# Patient Record
Sex: MALE | Race: WHITE | NOT HISPANIC OR LATINO | Employment: OTHER | URBAN - METROPOLITAN AREA
[De-identification: names, ages, dates, MRNs, and addresses within clinical notes are randomized per-mention and may not be internally consistent; named-entity substitution may affect disease eponyms.]

---

## 2020-05-28 ENCOUNTER — HOSPITAL ENCOUNTER (INPATIENT)
Facility: HOSPITAL | Age: 70
LOS: 2 days | Discharge: HOME | DRG: 872 | End: 2020-05-30
Attending: EMERGENCY MEDICINE | Admitting: STUDENT IN AN ORGANIZED HEALTH CARE EDUCATION/TRAINING PROGRAM
Payer: COMMERCIAL

## 2020-05-28 ENCOUNTER — APPOINTMENT (EMERGENCY)
Dept: CT IMAGING | Facility: HOSPITAL | Age: 70
DRG: 872 | End: 2020-05-28
Payer: COMMERCIAL

## 2020-05-28 DIAGNOSIS — K92.1 MELENA: ICD-10-CM

## 2020-05-28 DIAGNOSIS — D68.32 WARFARIN-INDUCED COAGULOPATHY (HCC): ICD-10-CM

## 2020-05-28 DIAGNOSIS — R10.9 ABDOMINAL PAIN, UNSPECIFIED ABDOMINAL LOCATION: ICD-10-CM

## 2020-05-28 DIAGNOSIS — K52.9 JEJUNITIS: Primary | ICD-10-CM

## 2020-05-28 DIAGNOSIS — E77.8 HYPOPROTEINEMIA (HCC): ICD-10-CM

## 2020-05-28 DIAGNOSIS — T45.515A WARFARIN-INDUCED COAGULOPATHY (HCC): ICD-10-CM

## 2020-05-28 DIAGNOSIS — K92.1 GASTROINTESTINAL HEMORRHAGE WITH MELENA: ICD-10-CM

## 2020-05-28 DIAGNOSIS — E83.51 HYPOCALCEMIA: ICD-10-CM

## 2020-05-28 DIAGNOSIS — D62 ACUTE BLOOD LOSS ANEMIA: ICD-10-CM

## 2020-05-28 PROBLEM — E11.9 DIABETES MELLITUS (HCC): Status: ACTIVE | Noted: 2020-05-28

## 2020-05-28 PROBLEM — G70.01 MYASTHENIA GRAVIS WITH EXACERBATION (HCC): Status: ACTIVE | Noted: 2019-01-09

## 2020-05-28 PROBLEM — C61 MALIGNANT NEOPLASM OF PROSTATE (HCC): Status: ACTIVE | Noted: 2020-05-28

## 2020-05-28 PROBLEM — E78.2 MIXED HYPERLIPIDEMIA: Status: ACTIVE | Noted: 2020-05-28

## 2020-05-28 PROBLEM — A41.9 SEPSIS (HCC): Status: ACTIVE | Noted: 2020-05-28

## 2020-05-28 PROBLEM — R79.1 SUPRATHERAPEUTIC INR: Status: ACTIVE | Noted: 2020-05-28

## 2020-05-28 PROBLEM — J44.9 CHRONIC OBSTRUCTIVE LUNG DISEASE (HCC): Status: ACTIVE | Noted: 2020-05-28

## 2020-05-28 LAB
ABO GROUP BLD: NORMAL
ABO GROUP BLD: NORMAL
ALBUMIN SERPL BCP-MCNC: 2.7 G/DL (ref 3.5–5)
ALP SERPL-CCNC: 43 U/L (ref 46–116)
ALT SERPL W P-5'-P-CCNC: 25 U/L (ref 12–78)
ANION GAP SERPL CALCULATED.3IONS-SCNC: 14 MMOL/L (ref 4–13)
APTT PPP: 102 SECONDS (ref 23–37)
AST SERPL W P-5'-P-CCNC: 16 U/L (ref 5–45)
BASOPHILS # BLD AUTO: 0.02 THOUSANDS/ΜL (ref 0–0.1)
BASOPHILS NFR BLD AUTO: 0 % (ref 0–1)
BILIRUB SERPL-MCNC: 0.5 MG/DL (ref 0.2–1)
BLD GP AB SCN SERPL QL: NEGATIVE
BUN SERPL-MCNC: 28 MG/DL (ref 5–25)
CALCIUM SERPL-MCNC: 7.6 MG/DL (ref 8.3–10.1)
CHLORIDE SERPL-SCNC: 100 MMOL/L (ref 100–108)
CO2 SERPL-SCNC: 24 MMOL/L (ref 21–32)
CREAT SERPL-MCNC: 1.16 MG/DL (ref 0.6–1.3)
CRP SERPL QL: 84.3 MG/L
EOSINOPHIL # BLD AUTO: 0.02 THOUSAND/ΜL (ref 0–0.61)
EOSINOPHIL NFR BLD AUTO: 0 % (ref 0–6)
ERYTHROCYTE [DISTWIDTH] IN BLOOD BY AUTOMATED COUNT: 12.9 % (ref 11.6–15.1)
GFR SERPL CREATININE-BSD FRML MDRD: 63 ML/MIN/1.73SQ M
GLUCOSE SERPL-MCNC: 186 MG/DL (ref 65–140)
GLUCOSE SERPL-MCNC: 193 MG/DL (ref 65–140)
HCT VFR BLD AUTO: 36.4 % (ref 36.5–49.3)
HGB BLD-MCNC: 12 G/DL (ref 12–17)
HGB BLD-MCNC: 9.7 G/DL (ref 12–17)
IMM GRANULOCYTES # BLD AUTO: 0.09 THOUSAND/UL (ref 0–0.2)
IMM GRANULOCYTES NFR BLD AUTO: 1 % (ref 0–2)
INR PPP: 8.6 (ref 0.84–1.19)
LACTATE SERPL-SCNC: 2 MMOL/L (ref 0.5–2)
LACTATE SERPL-SCNC: 2.5 MMOL/L (ref 0.5–2)
LYMPHOCYTES # BLD AUTO: 2.11 THOUSANDS/ΜL (ref 0.6–4.47)
LYMPHOCYTES NFR BLD AUTO: 15 % (ref 14–44)
MAGNESIUM SERPL-MCNC: 2 MG/DL (ref 1.6–2.6)
MCH RBC QN AUTO: 31.3 PG (ref 26.8–34.3)
MCHC RBC AUTO-ENTMCNC: 33 G/DL (ref 31.4–37.4)
MCV RBC AUTO: 95 FL (ref 82–98)
MONOCYTES # BLD AUTO: 1.09 THOUSAND/ΜL (ref 0.17–1.22)
MONOCYTES NFR BLD AUTO: 8 % (ref 4–12)
NEUTROPHILS # BLD AUTO: 11.19 THOUSANDS/ΜL (ref 1.85–7.62)
NEUTS SEG NFR BLD AUTO: 76 % (ref 43–75)
NRBC BLD AUTO-RTO: 0 /100 WBCS
PLATELET # BLD AUTO: 302 THOUSANDS/UL (ref 149–390)
PMV BLD AUTO: 9.7 FL (ref 8.9–12.7)
POTASSIUM SERPL-SCNC: 5 MMOL/L (ref 3.5–5.3)
PROT SERPL-MCNC: 5.7 G/DL (ref 6.4–8.2)
PROTHROMBIN TIME: 72.8 SECONDS (ref 11.6–14.5)
RBC # BLD AUTO: 3.84 MILLION/UL (ref 3.88–5.62)
RH BLD: POSITIVE
RH BLD: POSITIVE
SARS-COV-2 RNA RESP QL NAA+PROBE: NEGATIVE
SODIUM SERPL-SCNC: 138 MMOL/L (ref 136–145)
SPECIMEN EXPIRATION DATE: NORMAL
TROPONIN I SERPL-MCNC: <0.02 NG/ML
TSH SERPL DL<=0.05 MIU/L-ACNC: 0.46 UIU/ML (ref 0.36–3.74)
WBC # BLD AUTO: 14.52 THOUSAND/UL (ref 4.31–10.16)

## 2020-05-28 PROCEDURE — 36415 COLL VENOUS BLD VENIPUNCTURE: CPT | Performed by: EMERGENCY MEDICINE

## 2020-05-28 PROCEDURE — 96365 THER/PROPH/DIAG IV INF INIT: CPT

## 2020-05-28 PROCEDURE — 87040 BLOOD CULTURE FOR BACTERIA: CPT | Performed by: NURSE PRACTITIONER

## 2020-05-28 PROCEDURE — 86901 BLOOD TYPING SEROLOGIC RH(D): CPT | Performed by: EMERGENCY MEDICINE

## 2020-05-28 PROCEDURE — 83735 ASSAY OF MAGNESIUM: CPT | Performed by: EMERGENCY MEDICINE

## 2020-05-28 PROCEDURE — 86850 RBC ANTIBODY SCREEN: CPT | Performed by: EMERGENCY MEDICINE

## 2020-05-28 PROCEDURE — 86900 BLOOD TYPING SEROLOGIC ABO: CPT | Performed by: EMERGENCY MEDICINE

## 2020-05-28 PROCEDURE — 85025 COMPLETE CBC W/AUTO DIFF WBC: CPT | Performed by: EMERGENCY MEDICINE

## 2020-05-28 PROCEDURE — 82948 REAGENT STRIP/BLOOD GLUCOSE: CPT

## 2020-05-28 PROCEDURE — 99285 EMERGENCY DEPT VISIT HI MDM: CPT | Performed by: EMERGENCY MEDICINE

## 2020-05-28 PROCEDURE — 74177 CT ABD & PELVIS W/CONTRAST: CPT

## 2020-05-28 PROCEDURE — 85018 HEMOGLOBIN: CPT | Performed by: NURSE PRACTITIONER

## 2020-05-28 PROCEDURE — 96366 THER/PROPH/DIAG IV INF ADDON: CPT

## 2020-05-28 PROCEDURE — 93005 ELECTROCARDIOGRAM TRACING: CPT

## 2020-05-28 PROCEDURE — 85610 PROTHROMBIN TIME: CPT | Performed by: EMERGENCY MEDICINE

## 2020-05-28 PROCEDURE — 86140 C-REACTIVE PROTEIN: CPT | Performed by: EMERGENCY MEDICINE

## 2020-05-28 PROCEDURE — 83036 HEMOGLOBIN GLYCOSYLATED A1C: CPT | Performed by: NURSE PRACTITIONER

## 2020-05-28 PROCEDURE — 84443 ASSAY THYROID STIM HORMONE: CPT | Performed by: NURSE PRACTITIONER

## 2020-05-28 PROCEDURE — 99285 EMERGENCY DEPT VISIT HI MDM: CPT

## 2020-05-28 PROCEDURE — 87635 SARS-COV-2 COVID-19 AMP PRB: CPT | Performed by: EMERGENCY MEDICINE

## 2020-05-28 PROCEDURE — 83605 ASSAY OF LACTIC ACID: CPT | Performed by: EMERGENCY MEDICINE

## 2020-05-28 PROCEDURE — 85730 THROMBOPLASTIN TIME PARTIAL: CPT | Performed by: EMERGENCY MEDICINE

## 2020-05-28 PROCEDURE — 96375 TX/PRO/DX INJ NEW DRUG ADDON: CPT

## 2020-05-28 PROCEDURE — 99223 1ST HOSP IP/OBS HIGH 75: CPT | Performed by: NURSE PRACTITIONER

## 2020-05-28 PROCEDURE — 82272 OCCULT BLD FECES 1-3 TESTS: CPT

## 2020-05-28 PROCEDURE — 80053 COMPREHEN METABOLIC PANEL: CPT | Performed by: EMERGENCY MEDICINE

## 2020-05-28 PROCEDURE — C9113 INJ PANTOPRAZOLE SODIUM, VIA: HCPCS | Performed by: EMERGENCY MEDICINE

## 2020-05-28 PROCEDURE — 84484 ASSAY OF TROPONIN QUANT: CPT | Performed by: EMERGENCY MEDICINE

## 2020-05-28 PROCEDURE — C9132 KCENTRA, PER I.U.: HCPCS | Performed by: EMERGENCY MEDICINE

## 2020-05-28 RX ORDER — NICOTINE 21 MG/24HR
1 PATCH, TRANSDERMAL 24 HOURS TRANSDERMAL DAILY
Status: DISCONTINUED | OUTPATIENT
Start: 2020-05-29 | End: 2020-05-30 | Stop reason: HOSPADM

## 2020-05-28 RX ORDER — MAGNESIUM HYDROXIDE/ALUMINUM HYDROXICE/SIMETHICONE 120; 1200; 1200 MG/30ML; MG/30ML; MG/30ML
30 SUSPENSION ORAL EVERY 6 HOURS PRN
Status: DISCONTINUED | OUTPATIENT
Start: 2020-05-28 | End: 2020-05-30 | Stop reason: HOSPADM

## 2020-05-28 RX ORDER — ONDANSETRON 2 MG/ML
4 INJECTION INTRAMUSCULAR; INTRAVENOUS EVERY 6 HOURS PRN
Status: DISCONTINUED | OUTPATIENT
Start: 2020-05-28 | End: 2020-05-30 | Stop reason: HOSPADM

## 2020-05-28 RX ORDER — SIMVASTATIN 20 MG
10 TABLET ORAL DAILY
COMMUNITY

## 2020-05-28 RX ORDER — FOLIC ACID 1 MG/1
1 TABLET ORAL 2 TIMES DAILY
COMMUNITY
Start: 2011-06-09

## 2020-05-28 RX ORDER — LEVOTHYROXINE SODIUM 112 UG/1
112 TABLET ORAL 2 TIMES DAILY
COMMUNITY

## 2020-05-28 RX ORDER — OMEPRAZOLE 20 MG/1
20 CAPSULE, DELAYED RELEASE ORAL 3 TIMES DAILY
COMMUNITY
Start: 2011-06-09 | End: 2020-05-30 | Stop reason: HOSPADM

## 2020-05-28 RX ORDER — ACETAMINOPHEN 160 MG
2000 TABLET,DISINTEGRATING ORAL
COMMUNITY

## 2020-05-28 RX ORDER — FOLIC ACID 1 MG/1
1 TABLET ORAL 2 TIMES DAILY
Status: DISCONTINUED | OUTPATIENT
Start: 2020-05-28 | End: 2020-05-30 | Stop reason: HOSPADM

## 2020-05-28 RX ORDER — PANTOPRAZOLE SODIUM 40 MG/1
40 TABLET, DELAYED RELEASE ORAL
Status: DISCONTINUED | OUTPATIENT
Start: 2020-05-29 | End: 2020-05-28

## 2020-05-28 RX ORDER — SODIUM CHLORIDE 9 MG/ML
75 INJECTION, SOLUTION INTRAVENOUS CONTINUOUS
Status: DISCONTINUED | OUTPATIENT
Start: 2020-05-28 | End: 2020-05-29

## 2020-05-28 RX ORDER — WARFARIN SODIUM 2.5 MG/1
2.5 TABLET ORAL DAILY
COMMUNITY
Start: 2012-03-02 | End: 2020-05-30 | Stop reason: HOSPADM

## 2020-05-28 RX ORDER — HYDROMORPHONE HCL/PF 1 MG/ML
0.5 SYRINGE (ML) INJECTION ONCE
Status: COMPLETED | OUTPATIENT
Start: 2020-05-28 | End: 2020-05-28

## 2020-05-28 RX ORDER — LEVOTHYROXINE SODIUM 112 UG/1
224 TABLET ORAL DAILY
Status: DISCONTINUED | OUTPATIENT
Start: 2020-05-29 | End: 2020-05-30 | Stop reason: HOSPADM

## 2020-05-28 RX ORDER — ACETAMINOPHEN 325 MG/1
325 TABLET ORAL CONTINUOUS PRN
COMMUNITY

## 2020-05-28 RX ORDER — PRAVASTATIN SODIUM 40 MG
40 TABLET ORAL
Status: DISCONTINUED | OUTPATIENT
Start: 2020-05-29 | End: 2020-05-30 | Stop reason: HOSPADM

## 2020-05-28 RX ORDER — CIPROFLOXACIN 2 MG/ML
400 INJECTION, SOLUTION INTRAVENOUS ONCE
Status: COMPLETED | OUTPATIENT
Start: 2020-05-28 | End: 2020-05-28

## 2020-05-28 RX ORDER — GLIPIZIDE 5 MG/1
5 TABLET, FILM COATED, EXTENDED RELEASE ORAL
COMMUNITY

## 2020-05-28 RX ORDER — ATROPINE SULFATE 0.05 MG/ML
0.25 INJECTION INTRAVENOUS 3 TIMES DAILY PRN
COMMUNITY

## 2020-05-28 RX ORDER — SILDENAFIL 100 MG/1
100 TABLET, FILM COATED ORAL AS NEEDED
COMMUNITY

## 2020-05-28 RX ORDER — LEVOTHYROXINE SODIUM 112 UG/1
112 TABLET ORAL 2 TIMES DAILY
Status: DISCONTINUED | OUTPATIENT
Start: 2020-05-28 | End: 2020-05-28

## 2020-05-28 RX ORDER — ONDANSETRON 2 MG/ML
1 INJECTION INTRAMUSCULAR; INTRAVENOUS ONCE
Status: COMPLETED | OUTPATIENT
Start: 2020-05-28 | End: 2020-05-28

## 2020-05-28 RX ADMIN — IOHEXOL 100 ML: 350 INJECTION, SOLUTION INTRAVENOUS at 18:04

## 2020-05-28 RX ADMIN — FOLIC ACID 1 MG: 1 TABLET ORAL at 22:20

## 2020-05-28 RX ADMIN — SODIUM CHLORIDE 1000 ML: 0.9 INJECTION, SOLUTION INTRAVENOUS at 17:26

## 2020-05-28 RX ADMIN — SODIUM CHLORIDE 1000 ML: 0.9 INJECTION, SOLUTION INTRAVENOUS at 19:34

## 2020-05-28 RX ADMIN — CIPROFLOXACIN 400 MG: 2 INJECTION, SOLUTION INTRAVENOUS at 19:32

## 2020-05-28 RX ADMIN — SODIUM CHLORIDE 8 MG/HR: 9 INJECTION, SOLUTION INTRAVENOUS at 18:18

## 2020-05-28 RX ADMIN — HYDROMORPHONE HYDROCHLORIDE 0.5 MG: 1 INJECTION, SOLUTION INTRAMUSCULAR; INTRAVENOUS; SUBCUTANEOUS at 17:28

## 2020-05-28 RX ADMIN — PHYTONADIONE 10 MG: 10 INJECTION, EMULSION INTRAMUSCULAR; INTRAVENOUS; SUBCUTANEOUS at 18:44

## 2020-05-28 RX ADMIN — SODIUM CHLORIDE 80 MG: 9 INJECTION, SOLUTION INTRAVENOUS at 17:43

## 2020-05-28 RX ADMIN — Medication 3500 UNITS: at 18:59

## 2020-05-28 RX ADMIN — METRONIDAZOLE 500 MG: 500 INJECTION, SOLUTION INTRAVENOUS at 20:36

## 2020-05-28 RX ADMIN — INSULIN LISPRO 1 UNITS: 100 INJECTION, SOLUTION INTRAVENOUS; SUBCUTANEOUS at 22:51

## 2020-05-28 RX ADMIN — SODIUM CHLORIDE 75 ML/HR: 0.9 INJECTION, SOLUTION INTRAVENOUS at 22:20

## 2020-05-29 ENCOUNTER — ANESTHESIA EVENT (INPATIENT)
Dept: GASTROENTEROLOGY | Facility: HOSPITAL | Age: 70
DRG: 872 | End: 2020-05-29
Payer: COMMERCIAL

## 2020-05-29 ENCOUNTER — APPOINTMENT (INPATIENT)
Dept: GASTROENTEROLOGY | Facility: HOSPITAL | Age: 70
DRG: 872 | End: 2020-05-29
Payer: COMMERCIAL

## 2020-05-29 ENCOUNTER — ANESTHESIA (INPATIENT)
Dept: GASTROENTEROLOGY | Facility: HOSPITAL | Age: 70
DRG: 872 | End: 2020-05-29
Payer: COMMERCIAL

## 2020-05-29 LAB
ALBUMIN SERPL BCP-MCNC: 2.2 G/DL (ref 3.5–5)
ALP SERPL-CCNC: 35 U/L (ref 46–116)
ALT SERPL W P-5'-P-CCNC: 21 U/L (ref 12–78)
ANION GAP SERPL CALCULATED.3IONS-SCNC: 6 MMOL/L (ref 4–13)
AST SERPL W P-5'-P-CCNC: 15 U/L (ref 5–45)
ATRIAL RATE: 90 BPM
BILIRUB SERPL-MCNC: 0.7 MG/DL (ref 0.2–1)
BUN SERPL-MCNC: 19 MG/DL (ref 5–25)
CALCIUM SERPL-MCNC: 7.2 MG/DL (ref 8.3–10.1)
CHLORIDE SERPL-SCNC: 105 MMOL/L (ref 100–108)
CO2 SERPL-SCNC: 26 MMOL/L (ref 21–32)
CREAT SERPL-MCNC: 0.84 MG/DL (ref 0.6–1.3)
ERYTHROCYTE [DISTWIDTH] IN BLOOD BY AUTOMATED COUNT: 12.9 % (ref 11.6–15.1)
EST. AVERAGE GLUCOSE BLD GHB EST-MCNC: 203 MG/DL
GFR SERPL CREATININE-BSD FRML MDRD: 88 ML/MIN/1.73SQ M
GLUCOSE SERPL-MCNC: 104 MG/DL (ref 65–140)
GLUCOSE SERPL-MCNC: 105 MG/DL (ref 65–140)
GLUCOSE SERPL-MCNC: 113 MG/DL (ref 65–140)
GLUCOSE SERPL-MCNC: 213 MG/DL (ref 65–140)
GLUCOSE SERPL-MCNC: 264 MG/DL (ref 65–140)
HBA1C MFR BLD: 8.7 %
HCT VFR BLD AUTO: 30 % (ref 36.5–49.3)
HGB BLD-MCNC: 10.1 G/DL (ref 12–17)
HGB BLD-MCNC: 10.1 G/DL (ref 12–17)
HGB BLD-MCNC: 10.8 G/DL (ref 12–17)
INR PPP: 1.05 (ref 0.84–1.19)
MAGNESIUM SERPL-MCNC: 1.8 MG/DL (ref 1.6–2.6)
MCH RBC QN AUTO: 32.2 PG (ref 26.8–34.3)
MCHC RBC AUTO-ENTMCNC: 33.7 G/DL (ref 31.4–37.4)
MCV RBC AUTO: 96 FL (ref 82–98)
P AXIS: 75 DEGREES
PHOSPHATE SERPL-MCNC: 2.6 MG/DL (ref 2.3–4.1)
PLATELET # BLD AUTO: 235 THOUSANDS/UL (ref 149–390)
PMV BLD AUTO: 9.4 FL (ref 8.9–12.7)
POTASSIUM SERPL-SCNC: 4 MMOL/L (ref 3.5–5.3)
PR INTERVAL: 158 MS
PROT SERPL-MCNC: 5.3 G/DL (ref 6.4–8.2)
PROTHROMBIN TIME: 13.7 SECONDS (ref 11.6–14.5)
QRS AXIS: 80 DEGREES
QRSD INTERVAL: 78 MS
QT INTERVAL: 364 MS
QTC INTERVAL: 445 MS
RBC # BLD AUTO: 3.14 MILLION/UL (ref 3.88–5.62)
SODIUM SERPL-SCNC: 137 MMOL/L (ref 136–145)
T WAVE AXIS: 87 DEGREES
VENTRICULAR RATE: 90 BPM
WBC # BLD AUTO: 9.92 THOUSAND/UL (ref 4.31–10.16)

## 2020-05-29 PROCEDURE — 84100 ASSAY OF PHOSPHORUS: CPT | Performed by: NURSE PRACTITIONER

## 2020-05-29 PROCEDURE — 88305 TISSUE EXAM BY PATHOLOGIST: CPT | Performed by: PATHOLOGY

## 2020-05-29 PROCEDURE — 85027 COMPLETE CBC AUTOMATED: CPT | Performed by: NURSE PRACTITIONER

## 2020-05-29 PROCEDURE — 88342 IMHCHEM/IMCYTCHM 1ST ANTB: CPT | Performed by: PATHOLOGY

## 2020-05-29 PROCEDURE — 85610 PROTHROMBIN TIME: CPT | Performed by: PHYSICIAN ASSISTANT

## 2020-05-29 PROCEDURE — 94760 N-INVAS EAR/PLS OXIMETRY 1: CPT

## 2020-05-29 PROCEDURE — 99222 1ST HOSP IP/OBS MODERATE 55: CPT | Performed by: INTERNAL MEDICINE

## 2020-05-29 PROCEDURE — 0DB68ZX EXCISION OF STOMACH, VIA NATURAL OR ARTIFICIAL OPENING ENDOSCOPIC, DIAGNOSTIC: ICD-10-PCS | Performed by: INTERNAL MEDICINE

## 2020-05-29 PROCEDURE — 85018 HEMOGLOBIN: CPT | Performed by: INTERNAL MEDICINE

## 2020-05-29 PROCEDURE — C9113 INJ PANTOPRAZOLE SODIUM, VIA: HCPCS | Performed by: EMERGENCY MEDICINE

## 2020-05-29 PROCEDURE — 94664 DEMO&/EVAL PT USE INHALER: CPT

## 2020-05-29 PROCEDURE — 44361 SMALL BOWEL ENDOSCOPY/BIOPSY: CPT | Performed by: INTERNAL MEDICINE

## 2020-05-29 PROCEDURE — 80053 COMPREHEN METABOLIC PANEL: CPT | Performed by: NURSE PRACTITIONER

## 2020-05-29 PROCEDURE — 93010 ELECTROCARDIOGRAM REPORT: CPT | Performed by: INTERNAL MEDICINE

## 2020-05-29 PROCEDURE — 82948 REAGENT STRIP/BLOOD GLUCOSE: CPT

## 2020-05-29 PROCEDURE — 0DB98ZX EXCISION OF DUODENUM, VIA NATURAL OR ARTIFICIAL OPENING ENDOSCOPIC, DIAGNOSTIC: ICD-10-PCS | Performed by: INTERNAL MEDICINE

## 2020-05-29 PROCEDURE — 83735 ASSAY OF MAGNESIUM: CPT | Performed by: NURSE PRACTITIONER

## 2020-05-29 PROCEDURE — 99232 SBSQ HOSP IP/OBS MODERATE 35: CPT | Performed by: FAMILY MEDICINE

## 2020-05-29 PROCEDURE — 85018 HEMOGLOBIN: CPT | Performed by: NURSE PRACTITIONER

## 2020-05-29 RX ORDER — LIDOCAINE HYDROCHLORIDE 10 MG/ML
INJECTION, SOLUTION EPIDURAL; INFILTRATION; INTRACAUDAL; PERINEURAL AS NEEDED
Status: DISCONTINUED | OUTPATIENT
Start: 2020-05-29 | End: 2020-05-29 | Stop reason: SURG

## 2020-05-29 RX ORDER — PROPOFOL 10 MG/ML
INJECTION, EMULSION INTRAVENOUS AS NEEDED
Status: DISCONTINUED | OUTPATIENT
Start: 2020-05-29 | End: 2020-05-29 | Stop reason: SURG

## 2020-05-29 RX ORDER — PANTOPRAZOLE SODIUM 40 MG/1
40 TABLET, DELAYED RELEASE ORAL
Status: DISCONTINUED | OUTPATIENT
Start: 2020-05-30 | End: 2020-05-30 | Stop reason: HOSPADM

## 2020-05-29 RX ORDER — METOCLOPRAMIDE HYDROCHLORIDE 5 MG/ML
10 INJECTION INTRAMUSCULAR; INTRAVENOUS ONCE
Status: COMPLETED | OUTPATIENT
Start: 2020-05-29 | End: 2020-05-29

## 2020-05-29 RX ADMIN — PROPOFOL 30 MG: 10 INJECTION, EMULSION INTRAVENOUS at 12:47

## 2020-05-29 RX ADMIN — SODIUM CHLORIDE 8 MG/HR: 9 INJECTION, SOLUTION INTRAVENOUS at 05:11

## 2020-05-29 RX ADMIN — LEVOTHYROXINE SODIUM 224 MCG: 112 TABLET ORAL at 05:11

## 2020-05-29 RX ADMIN — CEFTRIAXONE SODIUM 1000 MG: 10 INJECTION, POWDER, FOR SOLUTION INTRAVENOUS at 08:58

## 2020-05-29 RX ADMIN — PRAVASTATIN SODIUM 40 MG: 40 TABLET ORAL at 18:17

## 2020-05-29 RX ADMIN — PROPOFOL 30 MG: 10 INJECTION, EMULSION INTRAVENOUS at 12:50

## 2020-05-29 RX ADMIN — INSULIN LISPRO 2 UNITS: 100 INJECTION, SOLUTION INTRAVENOUS; SUBCUTANEOUS at 16:17

## 2020-05-29 RX ADMIN — LIDOCAINE HYDROCHLORIDE 100 MG: 10 INJECTION, SOLUTION EPIDURAL; INFILTRATION; INTRACAUDAL; PERINEURAL at 12:42

## 2020-05-29 RX ADMIN — PHENYLEPHRINE HYDROCHLORIDE 200 MCG: 10 INJECTION INTRAVENOUS at 12:42

## 2020-05-29 RX ADMIN — INSULIN LISPRO 2 UNITS: 100 INJECTION, SOLUTION INTRAVENOUS; SUBCUTANEOUS at 21:34

## 2020-05-29 RX ADMIN — METRONIDAZOLE 500 MG: 500 INJECTION, SOLUTION INTRAVENOUS at 21:34

## 2020-05-29 RX ADMIN — GLUCAGON HYDROCHLORIDE 1 MG: KIT at 12:50

## 2020-05-29 RX ADMIN — METRONIDAZOLE 500 MG: 500 INJECTION, SOLUTION INTRAVENOUS at 14:35

## 2020-05-29 RX ADMIN — METRONIDAZOLE 500 MG: 500 INJECTION, SOLUTION INTRAVENOUS at 05:12

## 2020-05-29 RX ADMIN — METOCLOPRAMIDE HYDROCHLORIDE 10 MG: 5 INJECTION INTRAMUSCULAR; INTRAVENOUS at 11:03

## 2020-05-29 RX ADMIN — FOLIC ACID 1 MG: 1 TABLET ORAL at 18:17

## 2020-05-29 RX ADMIN — FOLIC ACID 1 MG: 1 TABLET ORAL at 08:58

## 2020-05-29 RX ADMIN — PROPOFOL 50 MG: 10 INJECTION, EMULSION INTRAVENOUS at 12:42

## 2020-05-29 RX ADMIN — SODIUM CHLORIDE 75 ML/HR: 0.9 INJECTION, SOLUTION INTRAVENOUS at 09:02

## 2020-05-30 VITALS
DIASTOLIC BLOOD PRESSURE: 67 MMHG | HEART RATE: 77 BPM | OXYGEN SATURATION: 99 % | BODY MASS INDEX: 26.01 KG/M2 | RESPIRATION RATE: 18 BRPM | SYSTOLIC BLOOD PRESSURE: 109 MMHG | TEMPERATURE: 98.9 F | WEIGHT: 161.82 LBS | HEIGHT: 66 IN

## 2020-05-30 LAB
ANION GAP SERPL CALCULATED.3IONS-SCNC: 8 MMOL/L (ref 4–13)
BASOPHILS # BLD AUTO: 0.06 THOUSANDS/ΜL (ref 0–0.1)
BASOPHILS NFR BLD AUTO: 1 % (ref 0–1)
BUN SERPL-MCNC: 13 MG/DL (ref 5–25)
CALCIUM SERPL-MCNC: 7.1 MG/DL (ref 8.3–10.1)
CHLORIDE SERPL-SCNC: 107 MMOL/L (ref 100–108)
CO2 SERPL-SCNC: 24 MMOL/L (ref 21–32)
CREAT SERPL-MCNC: 0.8 MG/DL (ref 0.6–1.3)
EOSINOPHIL # BLD AUTO: 0.3 THOUSAND/ΜL (ref 0–0.61)
EOSINOPHIL NFR BLD AUTO: 4 % (ref 0–6)
ERYTHROCYTE [DISTWIDTH] IN BLOOD BY AUTOMATED COUNT: 12.8 % (ref 11.6–15.1)
GFR SERPL CREATININE-BSD FRML MDRD: 90 ML/MIN/1.73SQ M
GLUCOSE SERPL-MCNC: 194 MG/DL (ref 65–140)
GLUCOSE SERPL-MCNC: 199 MG/DL (ref 65–140)
GLUCOSE SERPL-MCNC: 217 MG/DL (ref 65–140)
GLUCOSE SERPL-MCNC: 227 MG/DL (ref 65–140)
HCT VFR BLD AUTO: 28.8 % (ref 36.5–49.3)
HGB BLD-MCNC: 9.1 G/DL (ref 12–17)
HGB BLD-MCNC: 9.5 G/DL (ref 12–17)
IMM GRANULOCYTES # BLD AUTO: 0.08 THOUSAND/UL (ref 0–0.2)
IMM GRANULOCYTES NFR BLD AUTO: 1 % (ref 0–2)
LYMPHOCYTES # BLD AUTO: 2.49 THOUSANDS/ΜL (ref 0.6–4.47)
LYMPHOCYTES NFR BLD AUTO: 33 % (ref 14–44)
MCH RBC QN AUTO: 31.1 PG (ref 26.8–34.3)
MCHC RBC AUTO-ENTMCNC: 33 G/DL (ref 31.4–37.4)
MCV RBC AUTO: 94 FL (ref 82–98)
MONOCYTES # BLD AUTO: 0.68 THOUSAND/ΜL (ref 0.17–1.22)
MONOCYTES NFR BLD AUTO: 9 % (ref 4–12)
NEUTROPHILS # BLD AUTO: 3.84 THOUSANDS/ΜL (ref 1.85–7.62)
NEUTS SEG NFR BLD AUTO: 52 % (ref 43–75)
NRBC BLD AUTO-RTO: 0 /100 WBCS
PLATELET # BLD AUTO: 211 THOUSANDS/UL (ref 149–390)
PMV BLD AUTO: 9.4 FL (ref 8.9–12.7)
POTASSIUM SERPL-SCNC: 3.7 MMOL/L (ref 3.5–5.3)
RBC # BLD AUTO: 3.05 MILLION/UL (ref 3.88–5.62)
SODIUM SERPL-SCNC: 139 MMOL/L (ref 136–145)
WBC # BLD AUTO: 7.45 THOUSAND/UL (ref 4.31–10.16)

## 2020-05-30 PROCEDURE — 85018 HEMOGLOBIN: CPT | Performed by: INTERNAL MEDICINE

## 2020-05-30 PROCEDURE — 82948 REAGENT STRIP/BLOOD GLUCOSE: CPT

## 2020-05-30 PROCEDURE — 85025 COMPLETE CBC W/AUTO DIFF WBC: CPT | Performed by: INTERNAL MEDICINE

## 2020-05-30 PROCEDURE — 99239 HOSP IP/OBS DSCHRG MGMT >30: CPT | Performed by: FAMILY MEDICINE

## 2020-05-30 PROCEDURE — 80048 BASIC METABOLIC PNL TOTAL CA: CPT | Performed by: INTERNAL MEDICINE

## 2020-05-30 RX ORDER — LEVOFLOXACIN 500 MG/1
500 TABLET, FILM COATED ORAL EVERY 24 HOURS
Qty: 5 TABLET | Refills: 0 | Status: SHIPPED | OUTPATIENT
Start: 2020-05-30 | End: 2020-06-04

## 2020-05-30 RX ORDER — METRONIDAZOLE 500 MG/1
500 TABLET ORAL EVERY 8 HOURS SCHEDULED
Qty: 15 TABLET | Refills: 0 | Status: SHIPPED | OUTPATIENT
Start: 2020-05-30 | End: 2020-06-04

## 2020-05-30 RX ORDER — PANTOPRAZOLE SODIUM 40 MG/1
40 TABLET, DELAYED RELEASE ORAL
Qty: 30 TABLET | Refills: 0 | Status: SHIPPED | OUTPATIENT
Start: 2020-05-31

## 2020-05-30 RX ADMIN — LEVOTHYROXINE SODIUM 224 MCG: 112 TABLET ORAL at 05:09

## 2020-05-30 RX ADMIN — INSULIN LISPRO 1 UNITS: 100 INJECTION, SOLUTION INTRAVENOUS; SUBCUTANEOUS at 08:51

## 2020-05-30 RX ADMIN — NICOTINE 1 PATCH: 14 PATCH TRANSDERMAL at 08:51

## 2020-05-30 RX ADMIN — CEFTRIAXONE SODIUM 1000 MG: 10 INJECTION, POWDER, FOR SOLUTION INTRAVENOUS at 08:58

## 2020-05-30 RX ADMIN — METRONIDAZOLE 500 MG: 500 INJECTION, SOLUTION INTRAVENOUS at 05:10

## 2020-05-30 RX ADMIN — FOLIC ACID 1 MG: 1 TABLET ORAL at 08:51

## 2020-05-30 RX ADMIN — PANTOPRAZOLE SODIUM 40 MG: 40 TABLET, DELAYED RELEASE ORAL at 05:12

## 2020-06-01 RX ORDER — AMOXICILLIN AND CLAVULANATE POTASSIUM 875; 125 MG/1; MG/1
1 TABLET, FILM COATED ORAL EVERY 12 HOURS SCHEDULED
Qty: 10 TABLET | Refills: 0 | Status: SHIPPED | OUTPATIENT
Start: 2020-06-01 | End: 2020-06-06

## 2020-06-03 LAB
BACTERIA BLD CULT: NORMAL
BACTERIA BLD CULT: NORMAL

## 2020-06-09 ENCOUNTER — APPOINTMENT (EMERGENCY)
Dept: ULTRASOUND IMAGING | Facility: HOSPITAL | Age: 70
End: 2020-06-09
Payer: COMMERCIAL

## 2020-06-09 ENCOUNTER — HOSPITAL ENCOUNTER (EMERGENCY)
Facility: HOSPITAL | Age: 70
Discharge: HOME | End: 2020-06-09
Attending: EMERGENCY MEDICINE | Admitting: EMERGENCY MEDICINE
Payer: COMMERCIAL

## 2020-06-09 VITALS
OXYGEN SATURATION: 97 % | RESPIRATION RATE: 22 BRPM | SYSTOLIC BLOOD PRESSURE: 127 MMHG | DIASTOLIC BLOOD PRESSURE: 59 MMHG | HEART RATE: 104 BPM | TEMPERATURE: 97 F

## 2020-06-09 DIAGNOSIS — I82.621 ACUTE DEEP VEIN THROMBOSIS (DVT) OF RIGHT UPPER EXTREMITY (HCC): Primary | ICD-10-CM

## 2020-06-09 LAB
ANION GAP SERPL CALCULATED.3IONS-SCNC: 10 MMOL/L (ref 4–13)
APTT PPP: 27 SECONDS (ref 23–37)
BASOPHILS # BLD AUTO: 0.09 THOUSANDS/ΜL (ref 0–0.1)
BASOPHILS NFR BLD AUTO: 1 % (ref 0–1)
BUN SERPL-MCNC: 16 MG/DL (ref 5–25)
CALCIUM SERPL-MCNC: 9.1 MG/DL (ref 8.3–10.1)
CHLORIDE SERPL-SCNC: 105 MMOL/L (ref 100–108)
CO2 SERPL-SCNC: 24 MMOL/L (ref 21–32)
CREAT SERPL-MCNC: 0.96 MG/DL (ref 0.6–1.3)
D DIMER PPP FEU-MCNC: 1.96 UG/ML FEU
EOSINOPHIL # BLD AUTO: 0.27 THOUSAND/ΜL (ref 0–0.61)
EOSINOPHIL NFR BLD AUTO: 4 % (ref 0–6)
ERYTHROCYTE [DISTWIDTH] IN BLOOD BY AUTOMATED COUNT: 13.2 % (ref 11.6–15.1)
GFR SERPL CREATININE-BSD FRML MDRD: 79 ML/MIN/1.73SQ M
GLUCOSE SERPL-MCNC: 298 MG/DL (ref 65–140)
HCT VFR BLD AUTO: 42.8 % (ref 36.5–49.3)
HGB BLD-MCNC: 13.8 G/DL (ref 12–17)
IMM GRANULOCYTES # BLD AUTO: 0.07 THOUSAND/UL (ref 0–0.2)
IMM GRANULOCYTES NFR BLD AUTO: 1 % (ref 0–2)
INR PPP: 0.93 (ref 0.84–1.19)
LYMPHOCYTES # BLD AUTO: 2.37 THOUSANDS/ΜL (ref 0.6–4.47)
LYMPHOCYTES NFR BLD AUTO: 30 % (ref 14–44)
MCH RBC QN AUTO: 30.9 PG (ref 26.8–34.3)
MCHC RBC AUTO-ENTMCNC: 32.2 G/DL (ref 31.4–37.4)
MCV RBC AUTO: 96 FL (ref 82–98)
MONOCYTES # BLD AUTO: 0.45 THOUSAND/ΜL (ref 0.17–1.22)
MONOCYTES NFR BLD AUTO: 6 % (ref 4–12)
NEUTROPHILS # BLD AUTO: 4.54 THOUSANDS/ΜL (ref 1.85–7.62)
NEUTS SEG NFR BLD AUTO: 58 % (ref 43–75)
NRBC BLD AUTO-RTO: 0 /100 WBCS
PLATELET # BLD AUTO: 359 THOUSANDS/UL (ref 149–390)
PMV BLD AUTO: 9.1 FL (ref 8.9–12.7)
POTASSIUM SERPL-SCNC: 4.6 MMOL/L (ref 3.5–5.3)
PROTHROMBIN TIME: 12.4 SECONDS (ref 11.6–14.5)
RBC # BLD AUTO: 4.46 MILLION/UL (ref 3.88–5.62)
SODIUM SERPL-SCNC: 139 MMOL/L (ref 136–145)
WBC # BLD AUTO: 7.79 THOUSAND/UL (ref 4.31–10.16)

## 2020-06-09 PROCEDURE — 36415 COLL VENOUS BLD VENIPUNCTURE: CPT | Performed by: NURSE PRACTITIONER

## 2020-06-09 PROCEDURE — 93971 EXTREMITY STUDY: CPT | Performed by: SURGERY

## 2020-06-09 PROCEDURE — 80048 BASIC METABOLIC PNL TOTAL CA: CPT | Performed by: NURSE PRACTITIONER

## 2020-06-09 PROCEDURE — 85730 THROMBOPLASTIN TIME PARTIAL: CPT | Performed by: PHYSICIAN ASSISTANT

## 2020-06-09 PROCEDURE — 85610 PROTHROMBIN TIME: CPT | Performed by: PHYSICIAN ASSISTANT

## 2020-06-09 PROCEDURE — 99284 EMERGENCY DEPT VISIT MOD MDM: CPT | Performed by: NURSE PRACTITIONER

## 2020-06-09 PROCEDURE — 93971 EXTREMITY STUDY: CPT

## 2020-06-09 PROCEDURE — 99284 EMERGENCY DEPT VISIT MOD MDM: CPT

## 2020-06-09 PROCEDURE — 85025 COMPLETE CBC W/AUTO DIFF WBC: CPT | Performed by: NURSE PRACTITIONER

## 2020-06-09 PROCEDURE — 85379 FIBRIN DEGRADATION QUANT: CPT | Performed by: NURSE PRACTITIONER

## 2020-06-09 PROCEDURE — 96372 THER/PROPH/DIAG INJ SC/IM: CPT

## 2020-06-09 RX ADMIN — ENOXAPARIN SODIUM 70 MG: 80 INJECTION SUBCUTANEOUS at 18:15

## 2020-06-10 ENCOUNTER — PREP FOR PROCEDURE (OUTPATIENT)
Dept: GASTROENTEROLOGY | Facility: CLINIC | Age: 70
End: 2020-06-10

## 2020-06-10 ENCOUNTER — TELEPHONE (OUTPATIENT)
Dept: OTHER | Facility: OTHER | Age: 70
End: 2020-06-10

## 2020-06-10 ENCOUNTER — OFFICE VISIT (OUTPATIENT)
Dept: GASTROENTEROLOGY | Facility: CLINIC | Age: 70
End: 2020-06-10
Payer: COMMERCIAL

## 2020-06-10 VITALS
HEIGHT: 66 IN | BODY MASS INDEX: 26.26 KG/M2 | SYSTOLIC BLOOD PRESSURE: 98 MMHG | WEIGHT: 163.4 LBS | HEART RATE: 88 BPM | DIASTOLIC BLOOD PRESSURE: 72 MMHG

## 2020-06-10 DIAGNOSIS — Z20.822 ENCOUNTER FOR LABORATORY TESTING FOR COVID-19 VIRUS: ICD-10-CM

## 2020-06-10 DIAGNOSIS — D50.0 IRON DEFICIENCY ANEMIA DUE TO CHRONIC BLOOD LOSS: Primary | ICD-10-CM

## 2020-06-10 DIAGNOSIS — I82.721 CHRONIC DEEP VEIN THROMBOSIS (DVT) OF OTHER VEIN OF RIGHT UPPER EXTREMITY (HCC): ICD-10-CM

## 2020-06-10 DIAGNOSIS — R15.2 RECTAL URGENCY: ICD-10-CM

## 2020-06-10 PROCEDURE — 99214 OFFICE O/P EST MOD 30 MIN: CPT | Performed by: INTERNAL MEDICINE

## 2020-06-10 RX ORDER — PYRIDOSTIGMINE BROMIDE 60 MG/5ML
SOLUTION ORAL
COMMUNITY
End: 2021-08-20 | Stop reason: SDUPTHER

## 2020-06-10 RX ORDER — CHOLESTYRAMINE 4 G/9G
1 POWDER, FOR SUSPENSION ORAL 2 TIMES DAILY WITH MEALS
Qty: 62 PACKET | Refills: 3 | Status: SHIPPED | OUTPATIENT
Start: 2020-06-10

## 2020-06-10 RX ORDER — PYRIDOSTIGMINE BROMIDE 60 MG/1
60 TABLET ORAL 4 TIMES DAILY
COMMUNITY

## 2020-06-16 ENCOUNTER — TELEPHONE (OUTPATIENT)
Dept: GASTROENTEROLOGY | Facility: CLINIC | Age: 70
End: 2020-06-16

## 2020-09-03 ENCOUNTER — HOSPITAL ENCOUNTER (EMERGENCY)
Facility: HOSPITAL | Age: 70
Discharge: HOME | End: 2020-09-03
Attending: EMERGENCY MEDICINE
Payer: COMMERCIAL

## 2020-09-03 ENCOUNTER — APPOINTMENT (EMERGENCY)
Dept: RADIOLOGY | Facility: HOSPITAL | Age: 70
End: 2020-09-03
Payer: COMMERCIAL

## 2020-09-03 VITALS
DIASTOLIC BLOOD PRESSURE: 71 MMHG | TEMPERATURE: 97.7 F | HEIGHT: 66 IN | OXYGEN SATURATION: 98 % | BODY MASS INDEX: 26.22 KG/M2 | RESPIRATION RATE: 18 BRPM | HEART RATE: 98 BPM | SYSTOLIC BLOOD PRESSURE: 113 MMHG | WEIGHT: 163.14 LBS

## 2020-09-03 DIAGNOSIS — M25.511 SHOULDER PAIN, RIGHT: Primary | ICD-10-CM

## 2020-09-03 DIAGNOSIS — M89.8X2 PAIN OF RIGHT HUMERUS: ICD-10-CM

## 2020-09-03 DIAGNOSIS — R93.7 ABNORMAL BONE XRAY: ICD-10-CM

## 2020-09-03 PROCEDURE — 99285 EMERGENCY DEPT VISIT HI MDM: CPT | Performed by: PHYSICIAN ASSISTANT

## 2020-09-03 PROCEDURE — 99283 EMERGENCY DEPT VISIT LOW MDM: CPT

## 2020-09-03 PROCEDURE — 73030 X-RAY EXAM OF SHOULDER: CPT

## 2020-09-03 PROCEDURE — 73060 X-RAY EXAM OF HUMERUS: CPT

## 2020-09-03 NOTE — ED PROVIDER NOTES
History  Chief Complaint   Patient presents with   Earna Natacha Fall     The patient fell a week and a half ago against his refrigerator, injuring his right arm/shoulder  He states that the pain hasn't improved  49-year-old male with past medical history significant for diabetic myasthenia, iron deficiency anemia, and chronic DVT of right upper extremity who presents to the emergency department for complaint of right shoulder and upper arm pain after a fall 1 5 weeks ago  Patient reports he tripped and hit his right shoulder against a refrigerator  He reports having pain in the right shoulder for 1 month before this incident, now worse since fall  Also reports pain is now radiating up into right side neck and down to right hand along middle distriubtion  Also reports he feels a bump on the outside of his upper arm  He denies any numbness, tingling, weakness, swelling, or skin color changes  He is compliant with Coumadin daily  He has a history of right rotator cuff repair with subsequent physical therapy 25y ago  He has been taking Tylenol with some relief  Prior to Admission Medications   Prescriptions Last Dose Informant Patient Reported? Taking?    Atropine Sulfate 0 25 MG/5ML SOSY  Self Yes Yes   Sig: Take 0 25 mg by mouth 3 (three) times a day as needed   Cholecalciferol (VITAMIN D3) 50 MCG (2000 UT) capsule  Self Yes Yes   Sig: Take 2,000 Units by mouth   Cyanocobalamin 1000 MCG SUBL  Self Yes Yes   Sig: Place 1,000 mcg under the tongue daily   Empagliflozin (Jardiance) 10 MG TABS  Self Yes Yes   Sig: Take 10 mg by mouth daily   Pyridostigmine Bromide 60 MG/5ML SOLN  Self Yes Yes   Sig: Take by mouth   acetaminophen (TYLENOL) 325 mg tablet  Self Yes Yes   Sig: Take 325 mg by mouth continuous as needed   cholestyramine (QUESTRAN) 4 g packet   No Yes   Sig: Take 1 packet (4 g total) by mouth 2 (two) times a day with meals   folic acid (FOLVITE) 1 mg tablet  Self Yes Yes   Sig: Take 1 mg by mouth 2 (two) times a day   glipiZIDE (GLUCOTROL XL) 5 mg 24 hr tablet  Self Yes Yes   Sig: Take 5 mg by mouth   levothyroxine 112 mcg tablet  Self Yes Yes   Sig: Take 112 mcg by mouth 2 (two) times a day   pantoprazole (PROTONIX) 40 mg tablet  Self No Yes   Sig: Take 1 tablet (40 mg total) by mouth daily in the early morning   pyridostigmine (MESTINON) 60 mg tablet  Self Yes Yes   Sig: Take 60 mg by mouth   saxagliptin (ONGLYZA) 5 MG tablet  Self Yes Yes   Sig: Take 5 mg by mouth daily   sildenafil (VIAGRA) 100 mg tablet  Self Yes Yes   Sig: Take 100 mg by mouth   simvastatin (ZOCOR) 20 mg tablet  Self Yes Yes   Sig: Take 10 mg by mouth daily Ordered as 20mg pt takes half a tab      Facility-Administered Medications: None       Past Medical History:   Diagnosis Date    [facility] )     Disease of thyroid gland     History of blood clots     Myasthenia due to diabetes (HCC)     Prostate cancer ([place] [address] )     Thyroid cancer ([place] [address] )        Past Surgical History:   Procedure Laterality Date    THYROIDECTOMY         Family History   Family history unknown: Yes     I have reviewed and agree with the history as documented  E-Cigarette/Vaping    E-Cigarette Use Never User      E-Cigarette/Vaping Substances     Social History     Tobacco Use    Smoking status: Current Every Day Smoker     Packs/day: 0 50    Smokeless tobacco: Never Used   Substance Use Topics    Alcohol use: Not Currently    Drug use: Not Currently       Review of Systems   Musculoskeletal: Positive for arthralgias and neck pain  Negative for joint swelling and neck stiffness  Neurological: Negative for weakness and numbness  All other systems reviewed and are negative  Physical Exam  Physical Exam  Constitutional:       Appearance: He is well-developed  Eyes:      Pupils: Pupils are equal, round, and reactive to light  Neck:      Musculoskeletal: Full passive range of motion without pain, normal range of motion and neck supple  Cardiovascular:      Rate and Rhythm: Normal rate and regular rhythm  Pulses:           Radial pulses are 2+ on the right side and 2+ on the left side  Musculoskeletal:         General: Tenderness present  No swelling, deformity or signs of injury  Right shoulder: He exhibits bony tenderness (anterior and posterior shoulder regions)  He exhibits normal range of motion, no swelling, no effusion, no crepitus, no deformity, no spasm, normal pulse and normal strength  Right upper arm: He exhibits swelling (questionable swelling versus muscle spasm versus bony lesion palpable as lump on exam at mid to proximal humerus)  He exhibits no bony tenderness  Skin:     General: Skin is warm and dry  Capillary Refill: Capillary refill takes less than 2 seconds  Findings: No abrasion, bruising, ecchymosis, erythema, signs of injury, laceration, petechiae or wound  Neurological:      Mental Status: He is alert and oriented to person, place, and time  Vital Signs  ED Triage Vitals [09/03/20 0136]   Temperature Pulse Respirations Blood Pressure SpO2   97 7 °F (36 5 °C) 98 18 113/71 98 %      Temp Source Heart Rate Source Patient Position - Orthostatic VS BP Location FiO2 (%)   Oral Monitor Sitting Right arm --      Pain Score       6           Vitals:    09/03/20 0136   BP: 113/71   Pulse: 98   Patient Position - Orthostatic VS: Sitting         Visual Acuity      ED Medications  Medications - No data to display    Diagnostic Studies  Results Reviewed     None                 XR shoulder 2+ views RIGHT   Final Result by Tania Canales MD (09/03 0708)      No acute osseous abnormality  Mild degenerative changes  Workstation performed: [id number]         XR humerus RIGHT   Final Result by Tania Canales MD (09/03 4333)      No acute osseous abnormality        Workstation performed: [id number]                    Procedures  Procedures         ED Course       US AUDIT      Most Recent Value   Initial Alcohol Screen: US AUDIT-C    1  How often do you have a drink containing alcohol?  0 Filed at: 09/03/2020 0139   2  How many drinks containing alcohol do you have on a typical day you are drinking? 0 Filed at: 09/03/2020 0139   3a  Male UNDER 65: How often do you have five or more drinks on one occasion? 0 Filed at: 09/03/2020 0139   Audit-C Score  0 Filed at: 09/03/2020 0139              Identification of Seniors at Risk      Most Recent Value   (ISAR) Identification of Seniors at Risk   Before the illness or injury that brought you to the Emergency, did you need someone to help you on a regular basis? 0 Filed at: 09/03/2020 0141   In the last 24 hours, have you needed more help than usual?  0 Filed at: 09/03/2020 0141   Have you been hospitalized for one or more nights during the past 6 months? 0 Filed at: 09/03/2020 0141   In general, do you see well?  0 Filed at: 09/03/2020 0141   In general, do you have serious problems with your memory? 0 Filed at: 09/03/2020 0141   Do you take more than three different medications every day? 1 Filed at: 09/03/2020 0141   ISAR Score  1 Filed at: 09/03/2020 0141          IRWIN/DAST-10      Most Recent Value   How many times in the past year have you    Used an illegal drug or used a prescription medication for non-medical reasons? Never Filed at: 09/03/2020 0139                                MDM  Number of Diagnoses or Management Options  Abnormal bone xray:   Pain of right humerus:   Shoulder pain, right:   Diagnosis management comments: On exam, patient has full ROM, neurovascularly intact, point tenderness at anterior and posterior shoulder, has palpable lump on lateral right upper arm  Consider traumatic fracture, rotator cuff injury, muscle strain/spasm  Will get x-ray of shoulder and humerus to evaluate for bony disease             Amount and/or Complexity of Data Reviewed  Tests in the radiology section of CPT®: ordered and reviewed  Discussion of test results with the performing providers: yes  Decide to obtain previous medical records or to obtain history from someone other than the patient: yes  Obtain history from someone other than the patient: yes  Review and summarize past medical records: yes  Discuss the patient with other providers: yes  Independent visualization of images, tracings, or specimens: yes    Patient Progress  Patient progress: stable (X-ray reviewed by me, showing no acute bony deformities in shoulder joint however there is an area of bony density at the mid to proximal lateral humeral region, oval in shape  Discussed with patient that this could be heterotopic bone growth, calcified tendon, or malignancy  He must f/u with orthopedic for further evaluation, to rule out malignant bone lesion here  Patient is at capacity to understand findings and recommendations  Feel his shoulder pain is most likely arthritis versus repeat rotator cuff injury  Again, f/u with ortho for further eval  Discussed supportive care and RICE regimen  Advised avoidance of heavy lifting and any aggravating activities  I reviewed and discussed imaging findings with the patient at bedside  I discussed emergency department return parameters  I answered any and all questions the patient had regarding emergency department course of evaluation and treatment   The patient verbalized understanding of and agreement with plan   )        Disposition  Final diagnoses:   Shoulder pain, right   Pain of right humerus   Abnormal bone xray     Time reflects when diagnosis was documented in both MDM as applicable and the Disposition within this note     Time User Action Codes Description Comment    9/3/2020  3:03 AM Spencer Halim Add [M25 511] Shoulder pain, right     9/3/2020  3:03 AM Spencer Halim Add [D49 645] Pain of right humerus     9/3/2020  3:03 AM Spencer Halim Add [R93 7] Abnormal bone xray       ED Disposition     ED Disposition Condition Date/Time Comment    Discharge Stable Thu Sep 3, 2020  3:03 AM Jake Holland discharge to home/self care              Follow-up Information     Follow up With Specialties Details Why Contact Info Additional Information    [facility], DO Sports Medicine Schedule an appointment as soon as possible for a visit in 1 day For further evaluation [address] [facility]  [address]  [facility] [address] [facility]       [address] [facility] Emergency Department Emergency Medicine Go to  If symptoms worsen [address] [facility] [id number] [phone] [place] [facility], [address], [place], [address]          Discharge Medication List as of 9/3/2020  3:04 AM      CONTINUE these medications which have NOT CHANGED    Details   acetaminophen (TYLENOL) 325 mg tablet Take 325 mg by mouth continuous as needed, Historical Med      Atropine Sulfate 0 25 MG/5ML SOSY Take 0 25 mg by mouth 3 (three) times a day as needed, Historical Med      Cholecalciferol (VITAMIN D3) 50 MCG (2000 UT) capsule Take 2,000 Units by mouth, Historical Med      cholestyramine (QUESTRAN) 4 g packet Take 1 packet (4 g total) by mouth 2 (two) times a day with meals, Starting Wed 6/10/2020, Normal      Cyanocobalamin 1000 MCG SUBL Place 1,000 mcg under the tongue daily, Starting Thu 6/9/2011, Historical Med      Empagliflozin (Jardiance) 10 MG TABS Take 10 mg by mouth daily, Historical Med      folic acid (FOLVITE) 1 mg tablet Take 1 mg by mouth 2 (two) times a day, Starting Thu 6/9/2011, Historical Med      glipiZIDE (GLUCOTROL XL) 5 mg 24 hr tablet Take 5 mg by mouth, Historical Med      levothyroxine 112 mcg tablet Take 112 mcg by mouth 2 (two) times a day, Historical Med      pantoprazole (PROTONIX) 40 mg tablet Take 1 tablet (40 mg total) by mouth daily in the early morning, Starting Sun 5/31/2020, Normal      pyridostigmine (MESTINON) 60 mg tablet Take 60 mg by mouth, Historical Med      Pyridostigmine Bromide 60 MG/5ML SOLN Take by mouth, Historical Med      saxagliptin (ONGLYZA) 5 MG tablet Take 5 mg by mouth daily, Historical Med      sildenafil (VIAGRA) 100 mg tablet Take 100 mg by mouth, Historical Med      simvastatin (ZOCOR) 20 mg tablet Take 10 mg by mouth daily Ordered as 20mg pt takes half a tab, Historical Med           No discharge procedures on file      PDMP Review     None          ED Provider  Electronically Signed by           Elvis Blanton PA-C  09/03/20 8136

## 2020-09-10 ENCOUNTER — TELEPHONE (OUTPATIENT)
Dept: OBGYN CLINIC | Facility: HOSPITAL | Age: 70
End: 2020-09-10

## 2020-09-24 ENCOUNTER — OFFICE VISIT (OUTPATIENT)
Dept: OBGYN CLINIC | Facility: CLINIC | Age: 70
End: 2020-09-24
Payer: COMMERCIAL

## 2020-09-24 VITALS
HEIGHT: 66 IN | HEART RATE: 83 BPM | DIASTOLIC BLOOD PRESSURE: 81 MMHG | WEIGHT: 163 LBS | SYSTOLIC BLOOD PRESSURE: 126 MMHG | BODY MASS INDEX: 26.2 KG/M2

## 2020-09-24 DIAGNOSIS — S40.021A ARM CONTUSION, RIGHT, INITIAL ENCOUNTER: Primary | ICD-10-CM

## 2020-09-24 PROCEDURE — 99203 OFFICE O/P NEW LOW 30 MIN: CPT | Performed by: ORTHOPAEDIC SURGERY

## 2020-09-24 NOTE — PROGRESS NOTES
CHIEF COMPLAINT:  Chief Complaint   Patient presents with    Right Arm - Pain       SUBJECTIVE:  Patricia Gutierrez is a 68y o  year old  male with history of a chronic DVT right upper extremity, prostate cancer, my standing and due to diabetes, thyroid cancer who presents to the office for diffuse right upper pain  Pt states that he had a fall about 1 month ago and has pain in his upper arm from fall that caused him to go to the ED 9/3/2020  Patient states that he was advised he has an absent x-ray and is concerned for bone cancer      PAST MEDICAL HISTORY:  Past Medical History:   Diagnosis Date    Diabetes mellitus ([facility] [address] [address] )     Disease of thyroid gland     History of blood clots     Myasthenia due to diabetes ([facility] [address] )     Prostate cancer ([facility] )     Thyroid cancer ([facility] [address] [address] )        PAST SURGICAL HISTORY:  Past Surgical History:   Procedure Laterality Date    THYROIDECTOMY         FAMILY HISTORY:  Family History   Family history unknown: Yes       SOCIAL HISTORY:  Social History     Tobacco Use    Smoking status: Current Every Day Smoker     Packs/day: 0 50    Smokeless tobacco: Never Used   Substance Use Topics    Alcohol use: Not Currently    Drug use: Not Currently       MEDICATIONS:    Current Outpatient Medications:     acetaminophen (TYLENOL) 325 mg tablet, Take 325 mg by mouth continuous as needed, Disp: , Rfl:     Atropine Sulfate 0 25 MG/5ML SOSY, Take 0 25 mg by mouth 3 (three) times a day as needed, Disp: , Rfl:     Cholecalciferol (VITAMIN D3) 50 MCG (2000 UT) capsule, Take 2,000 Units by mouth, Disp: , Rfl:     cholestyramine (QUESTRAN) 4 g packet, Take 1 packet (4 g total) by mouth 2 (two) times a day with meals, Disp: 62 packet, Rfl: 3    Cyanocobalamin 1000 MCG SUBL, Place 1,000 mcg under the tongue daily, Disp: , Rfl:     Empagliflozin (Jardiance) 10 MG TABS, Take 10 mg by mouth daily, Disp: , Rfl:     folic acid (FOLVITE) 1 mg tablet, Take 1 mg by mouth 2 (two) times a day, Disp: , Rfl:    glipiZIDE (GLUCOTROL XL) 5 mg 24 hr tablet, Take 5 mg by mouth, Disp: , Rfl:     levothyroxine 112 mcg tablet, Take 112 mcg by mouth 2 (two) times a day, Disp: , Rfl:     pantoprazole (PROTONIX) 40 mg tablet, Take 1 tablet (40 mg total) by mouth daily in the early morning, Disp: 30 tablet, Rfl: 0    pyridostigmine (MESTINON) 60 mg tablet, Take 60 mg by mouth, Disp: , Rfl:     Pyridostigmine Bromide 60 MG/5ML SOLN, Take by mouth, Disp: , Rfl:     saxagliptin (ONGLYZA) 5 MG tablet, Take 5 mg by mouth daily, Disp: , Rfl:     sildenafil (VIAGRA) 100 mg tablet, Take 100 mg by mouth, Disp: , Rfl:     simvastatin (ZOCOR) 20 mg tablet, Take 10 mg by mouth daily Ordered as 20mg pt takes half a tab, Disp: , Rfl:     ALLERGIES:  Allergies   Allergen Reactions    Cyclobenzaprine Other (See Comments)    Metformin Diarrhea    Other GI Intolerance    Oxycodone Itching    Apixaban Delirium       REVIEW OF SYSTEMS:  Review of Systems    VITALS:  Vitals:    09/24/20 0827   BP: 126/81   Pulse: 83       LABS:  HgA1c:   Lab Results   Component Value Date    HGBA1C 8 7 (H) 05/28/2020     BMP:   Lab Results   Component Value Date    CALCIUM 9 1 06/09/2020    K 4 6 06/09/2020    CO2 24 06/09/2020     06/09/2020    BUN 16 06/09/2020    CREATININE 0 96 06/09/2020       _____________________________________________________  PHYSICAL EXAMINATION:  General: well developed and well nourished, alert, oriented times 3 and appears comfortable  Psychiatric: Normal  HEENT: Trachea Midline, No torticollis  Pulmonary: No audible wheezing or strider  Cardiovascular: No discernable arrhythmia   Skin: No masses, erythema, lacerations, fluctation, ulcerations  Neurovascular: Sensation Intact to the Median, Ulnar, Radial Nerve, Motor Intact to the Median, Ulnar, Radial Nerve and Pulses Intact    MUSCULOSKELETAL EXAMINATION:  RUE  Full range of motion of right shoulder  Resisted internal and external rotation strength 5/5  Tenderness to palpation of the soft tissue mid humerus    ___________________________________________________  STUDIES REVIEWED:    X-rays of the right humerus and shoulder show no acute fractures or dislocations, no lytic or blastic lesions, no significant degenerative changes    PROCEDURES PERFORMED:  Procedures  No Procedures performed today    _____________________________________________________  ASSESSMENT/PLAN:    Right upper extremity contusion from fall  * x-rays were reviewed with patient showing no abnormalities  * patient was advised that the discomfort should resolve over time   order was placed for therapy to help reduce discomfort  * per patient's request a referral was placed for neurology so he could be evaluated for his myasthenia     Follow Up:  No follow-ups on file        To Do Next Visit:  Re-evaluation of current issue      Scribe Attestation    I,:   Shi Bocanegra am acting as a scribe while in the presence of the attending physician :        I,:   Daniel Dexter MD personally performed the services described in this documentation    as scribed in my presence :

## 2020-09-28 ENCOUNTER — TELEPHONE (OUTPATIENT)
Dept: OBGYN CLINIC | Facility: HOSPITAL | Age: 70
End: 2020-09-28

## 2020-09-28 DIAGNOSIS — R25.1 TREMOR: Primary | ICD-10-CM

## 2020-09-28 NOTE — TELEPHONE ENCOUNTER
DR Elina Matos  Re: NEURO referral edit  # [phone]    Patient states the referral placed by DR Gisel Mir to neurology needs to be edited to reflect "tremors" in oroder for patient to be scheduled with neuro

## 2021-05-28 ENCOUNTER — TELEPHONE (OUTPATIENT)
Dept: NEUROLOGY | Facility: CLINIC | Age: 71
End: 2021-05-28

## 2021-05-28 ENCOUNTER — TRANSCRIBE ORDERS (OUTPATIENT)
Dept: NEUROLOGY | Facility: CLINIC | Age: 71
End: 2021-05-28

## 2021-05-28 DIAGNOSIS — G70.00 MYASTHENIA GRAVIS WITHOUT (ACUTE) EXACERBATION (HCC): Primary | ICD-10-CM

## 2021-05-28 NOTE — TELEPHONE ENCOUNTER
I tried to reach out to patient to schedule from referral from Dr Dugan Bowels for myasthenia Gravis, unable to get in touch with patient - vm is not set up  Will try again later

## 2021-06-07 NOTE — TELEPHONE ENCOUNTER
Best contact number for patient:  [phone]  Emergency Contact name and number:  Cary Fenton  Referring provider and telephone number:  Dr Neena Sarmiento  Primary Care Provider Name and if affiliated with [facility]     Reason for Appointment/Dx:  Myasthenia Gravis  Have you seen and followed up with a pediatric Neurologist for this disease in the past?      No (If yes ok to schedule with Dr Brandee Ríos)    Neurology Location patient would like to be seen:    Order received? Yes                                                Records Received? Yes    Have you ever seen another Neurologist?       Yes, Neurology in [place] [id number] Name:    ID/Policy #:    Secondary Insurance:    ID/Policy#: Workman's Comp/ Accident/ School  Information      Workman's Comp/Accident/School related? Yes, describe: yes - neurology in [place]    If yes name of Insurance company:    Claim #:    Date of Injury:    Type of Injury:     Name and Telephone Number:    Notes:                   Appointment date:   Patient is scheduled with Dr José Ruiz on 8/20 @ 2:30 - pt is placed on wait list  New pt paperwork is mailed out

## 2021-08-20 ENCOUNTER — CONSULT (OUTPATIENT)
Dept: NEUROLOGY | Facility: CLINIC | Age: 71
End: 2021-08-20
Payer: COMMERCIAL

## 2021-08-20 VITALS
HEART RATE: 82 BPM | RESPIRATION RATE: 16 BRPM | TEMPERATURE: 97.7 F | BODY MASS INDEX: 25.71 KG/M2 | HEIGHT: 66 IN | WEIGHT: 160 LBS | SYSTOLIC BLOOD PRESSURE: 102 MMHG | DIASTOLIC BLOOD PRESSURE: 68 MMHG

## 2021-08-20 DIAGNOSIS — G70.00 MYASTHENIA GRAVIS WITHOUT (ACUTE) EXACERBATION (HCC): ICD-10-CM

## 2021-08-20 DIAGNOSIS — G20 PARKINSON'S DISEASE (HCC): Primary | ICD-10-CM

## 2021-08-20 PROBLEM — G20.A1 PARKINSON'S DISEASE (HCC): Status: ACTIVE | Noted: 2021-08-20

## 2021-08-20 PROCEDURE — 99244 OFF/OP CNSLTJ NEW/EST MOD 40: CPT | Performed by: PSYCHIATRY & NEUROLOGY

## 2021-08-20 NOTE — PROGRESS NOTES
Linette Goodwin is a 65 y o  male  Chief Complaint   Patient presents with    Neurologic Problem    Parkinson's Disease       Assessment:  1  Parkinson's disease ([address] )    2  Myasthenia gravis without (acute) exacerbation ([facility])        Plan:   MRI scan of the brain  blood work  increase Sinemet to 25/100 mg 3 times a day     fall safety and aspiration precautions    Discussion:   patient has features of Parkinson's disease with resting tremor cogwheeling rigidity and bradykinesia and stooped posture, he is on Sinemet 25/100 mg 2 times a day which I think is not adequate and hence I have advised him to increase it to 3 times a day and see how he does in the future if needed we can increase it further or we can add a dopamine agonist, side effects of the medication discussed with the patient and was advised to take fall and safety precautions and advised getting up for half an hour to 20 minutes after taking the medication he was also advised to continue follow-up with his neurologist at the [facility] for his myasthenia gravis as patient did not want me to address those issues we will check an as tell choline receptor antibody as his friend tells me that he has not had any blood work for myasthenia, he is getting his medications from his other neurologist in [facility] if he wishes he could follow with us for the myasthenia, he is on Mestinon 60 mg 4 times a day which is being managed by his neuromuscular clinic at [facility], also patient is going to think about it, we will need those records, he was advised home exercise program and fall and safety precautions he may need complete [place] at the next visit as he is not willing to do it now he was advised to keep his blood pressure cholesterol and sugar under control to go to the hospital if has any worsening symptoms and call me otherwise to see me back in 2-3 months and follow up with his other physicians    Subjective:    HPI    patient is here for evaluation of tremor and Parkinson's disease, he is accompanied with his friend according to the patient he tells me that he is not here for myasthenia gravis and follows up with a neuromuscular clinic at [facility] he has had resting tremor for more than a year he did see a neurologist on tele medicine for his Parkinson's disease but did not like him and hence wanted to be seen here he does complain of early morning stiffness and has freezing episodes he does have some hallucinations and also has some short-term memory issues he was not keen to do a [place] today, he tells me that he has been diagnosed with myasthenia gravis many years ago and follows up with the neuromuscular clinic and is on Mestinon and is doing well, he denies having any headaches no vision or speech difficulty no falls, he does have prostate cancer and has some urinary issues but no other complaints      Vitals:    08/20/21 1531   BP: 102/68   BP Location: Left arm   Patient Position: Sitting   Cuff Size: Standard   Pulse: 82   Resp: 16   Temp: 97 7 °F (36 5 °C)   TempSrc: Temporal   Weight: 72 6 kg (160 lb)   Height: 5' 6" (1 676 m)       Current Medications    Current Outpatient Medications:     acetaminophen (TYLENOL) 325 mg tablet, Take 325 mg by mouth continuous as needed, Disp: , Rfl:     Atropine Sulfate 0 25 MG/5ML SOSY, Take 0 25 mg by mouth 3 (three) times a day as needed, Disp: , Rfl:     Cholecalciferol (VITAMIN D3) 50 MCG (2000 UT) capsule, Take 2,000 Units by mouth, Disp: , Rfl:     cholestyramine (QUESTRAN) 4 g packet, Take 1 packet (4 g total) by mouth 2 (two) times a day with meals, Disp: 62 packet, Rfl: 3    Cyanocobalamin 1000 MCG SUBL, Place 1,000 mcg under the tongue daily 4 times a week, Disp: , Rfl:     folic acid (FOLVITE) 1 mg tablet, Take 1 mg by mouth 2 (two) times a day, Disp: , Rfl:     glipiZIDE (GLUCOTROL XL) 5 mg 24 hr tablet, Take 5 mg by mouth, Disp: , Rfl:     levothyroxine 112 mcg tablet, Take 112 mcg by mouth 2 (two) times a day, Disp: , Rfl:     pantoprazole (PROTONIX) 40 mg tablet, Take 1 tablet (40 mg total) by mouth daily in the early morning (Patient taking differently: Take 40 mg by mouth as needed ), Disp: 30 tablet, Rfl: 0    pyridostigmine (MESTINON) 60 mg tablet, Take 60 mg by mouth 4 (four) times a day , Disp: , Rfl:     saxagliptin (ONGLYZA) 5 MG tablet, Take 5 mg by mouth daily, Disp: , Rfl:     sildenafil (VIAGRA) 100 mg tablet, Take 100 mg by mouth as needed , Disp: , Rfl:     simvastatin (ZOCOR) 20 mg tablet, Take 10 mg by mouth daily Ordered as 20mg pt takes half a tab, Disp: , Rfl:       Allergies  Cyclobenzaprine, Metformin, Other, Oxycodone, and Apixaban    Past Medical History  Past Medical History:   Diagnosis Date    Diabetes mellitus [facility] )     Disease of thyroid gland     History of blood clots     [facility]     [facility] cancer ([facility] )     Thyroid cancer ([facility] )          Past Surgical History:  Past Surgical History:   Procedure Laterality Date    THYROIDECTOMY           Family History:  Family History   Family history unknown: Yes       Social History:   reports that he has been smoking cigarettes  He has a 30 50 pack-year smoking history  He has never used smokeless tobacco  He reports previous alcohol use  He reports previous drug use  I have reviewed the past medical history, surgical history, social and family history, current medications, allergies vitals, review of systems, and updated this information as appropriate today  Objective:    Physical Exam    Neurological Exam    GENERAL:  Cooperative in no acute distress  Well-developed and well-nourished    HEAD and NECK   Head is atraumatic normocephalic with no lesions or masses  Neck is supple with full range of motion    CARDIOVASCULAR  Carotid Arteries-no carotid bruits      NEUROLOGIC:  Mental Status-the patient is awake alert and oriented without aphasia or apraxia  Cranial Nerves: Visual fields are full to confrontation  Extraocular movements are full without nystagmus  Pupils are 2-1/2 mm and reactive  Face is symmetrical to light touch  Movements of facial expression move symmetrically  Hearing is normal to finger rub bilaterally  Soft palate lifts symmetrically  Shoulder shrug is symmetrical  Tongue is midline without atrophy  Motor: No drift is noted on arm extension  Strength is full in the upper and lower extremities with normal bulk and  cogwheeling rigidity, patient does have resting tremor of the right hand  Sensory: Intact to temperature and vibratory sensation in the upper and lower extremities bilaterally  Cortical function is intact  Coordination: Finger to nose testing  Shows mild dysmetria on the right side compared to the,Patient is on a wheelchair, ambulates with a cane and has a stooped posture  Reflexes:      2+ and symmetrical          ROS:  Review of Systems   Constitutional: Negative  Negative for appetite change and fever  HENT: Negative  Negative for hearing loss, tinnitus, trouble swallowing and voice change  Eyes: Negative  Negative for photophobia and pain  Respiratory: Negative  Negative for shortness of breath  Cardiovascular: Negative  Negative for palpitations  Gastrointestinal: Negative  Negative for nausea and vomiting  Endocrine: Negative  Negative for cold intolerance  Genitourinary: Negative  Negative for dysuria, frequency and urgency  Musculoskeletal: Positive for gait problem  Negative for myalgias and neck pain  Skin: Negative  Negative for rash  Neurological: Positive for tremors, speech difficulty, weakness and numbness  Negative for dizziness, seizures, syncope, facial asymmetry, light-headedness and headaches  Hematological: Negative  Does not bruise/bleed easily  Psychiatric/Behavioral: Negative  Negative for confusion, hallucinations and sleep disturbance

## 2021-09-02 ENCOUNTER — TELEPHONE (OUTPATIENT)
Dept: NEUROLOGY | Facility: CLINIC | Age: 71
End: 2021-09-02

## 2021-09-02 NOTE — TELEPHONE ENCOUNTER
Discussed with the nurse, told her I doubt that the medication is making his tremor worse but advised her to monitor him and see what the tremor is doing right after taking the medication to see if it is helping or not she is going to call me next week and let me know and depending on that we can decide if we need to increase the dose or  add a dopamine agonist or stop the medication

## 2021-09-02 NOTE — TELEPHONE ENCOUNTER
Received a call from [address] [place] [address]  She stated she wanted to inform the DR that the patient's c/o worsening right upper extremity tremors and increased fatigue since increasing the sinemet  She noticed it last week but it's worse this week  He's taking sinemet one tab TID  Please advise  She's requesting a call back       [phone]

## 2021-09-07 ENCOUNTER — TELEPHONE (OUTPATIENT)
Dept: NEUROLOGY | Facility: CLINIC | Age: 71
End: 2021-09-07

## 2021-09-07 NOTE — TELEPHONE ENCOUNTER
Patient is visiting nurse was not available discussed with the patient he feels his tremors are still bothering him and 1 hand more than the other he has been taking the Sinemet 3 times a day I advised him to increase it to 4 times a day and see how he does

## 2021-09-07 NOTE — TELEPHONE ENCOUNTER
Received a call from Devante Olivas at [facility] care  She stated that the pt's health has significantly declined  Pt is experiencing short term memory loss, tremors, hallucinations    Pt is scheduled to see Dr White Monday on 1/14/2022  Please contact pt at [phone]

## 2022-01-07 ENCOUNTER — TELEPHONE (OUTPATIENT)
Dept: NEUROLOGY | Facility: CLINIC | Age: 72
End: 2022-01-07

## 2022-01-07 NOTE — TELEPHONE ENCOUNTER
Received a msg to call this pt to sched - but not w Dr Fabricio Weiner  Pt wants to be seen in [place]  Tried to LM - but VM was full  Why Jose? Need TETO - have to start that process  Dr Stephanie Reed is best for his PD

## 2022-04-19 ENCOUNTER — HOSPITAL ENCOUNTER (OUTPATIENT)
Dept: RADIOLOGY | Facility: HOSPITAL | Age: 72
Discharge: HOME | End: 2022-04-19
Payer: COMMERCIAL

## 2022-04-19 DIAGNOSIS — G70.00 MYASTHENIA GRAVIS WITHOUT (ACUTE) EXACERBATION (HCC): ICD-10-CM

## 2022-04-19 PROCEDURE — 92611 MOTION FLUOROSCOPY/SWALLOW: CPT

## 2022-04-19 PROCEDURE — 74230 X-RAY XM SWLNG FUNCJ C+: CPT

## 2022-04-19 NOTE — PROCEDURES
Video Swallow Study      Patient Name: Arthur Islas  SWDKX'N Date: 4/19/2022        Past Medical History  Past Medical History:   Diagnosis Date    [facility]     Disease of thyroid gland     History of blood clots     Myasthenia due to diabetes [facility]     [facility] cancer [facility]     Thyroid cancer ([facility] )         Past Surgical History  Past Surgical History:   Procedure Laterality Date    THYROIDECTOMY           General Information:    67 yo gentleman referred to [facility]  for a VBS by Dr Xavi Post for dysphagia w/  c/o food, liquids, some pills sticking in this throat  Pt takes larger pills crushed in puree, smaller ones w/ water  Pt stated he takes small bites of foods and chews well  He admits to sig wt loss x 7 mos  Pt is currently seeing a speech therapist through home care  Cognition:  WNL    Speech/Swallow Mech: Oral motor movements appeared  WFL; Dentition was edentulous w/ upper denture; Respiratory Status: WFL on RA;   Current diet: regular diet w/ thin liquids  Prior VBS- none in [facility]     Pt was seen in radiology for a Video Barium Swallow Study, seated in the upright position and viewed laterally with the following consistencies: puree, soft/solid, hard solid, honey thick liquids, nectar thick liquids, thin liquids, 1/2 barium pill w/ water by straw  Results are as follows:     **Images are available for review on PACS          Oral Stage: Mild-moderately impaired   pt w/ adequate bolus retrieval via spoon, cup and straw  Prolonged oral processing of soft and hard solids  Decreased bolus formation, pc meal transfer, oral residue noted  Pt appeared w/ adequate oral control and slow transfer w/ liquids  Pharyngeal Stage: Mild-moderately impaired   swallow initiation was delayed with decreased laryngeal elevation, mildly decreased airway entrance closure, no epiglottic inversion, and weak tongue base retraction  Decreased pharyngeal constriction noted with increased pharyngeal retention with soft and hard solids  Deep penetration to VC with thin liquids by cup, ? Related to pyriform sinus retention of soft/solids  Pharyngeal retention cleared with w/ applesauce  Penetration w/ coating underside of VC also noted after the swallow w/ thin liquids w/ chin tuck  No aspiration observed  Esophageal Stage:   briefly assessed  No overt abnormality noted  All materials including barium pill passed through the esophagus w/o incident  Assessment Summary:   Mild-moderate oropharyngeal dysphagia due to prolonged oral processing, delayed swallow initiation, decreased laryngeal elevation, airway entrance closure, no epiglottic inversion, decreased tongue base retraction, small amounts of penetration but no aspiration observed                 Recommendations:   Soft, moist foods w/ thin liquids by straw w/ chin tuck  Use applesauce to clear pharyngeal retention as needed  meds in puree/applesauce  Aspiration precautions  Cont speech tx for pharyngeal strengthening      April Asaf Hardy MA CCC-SLP  Speech Pathologist  [place] license # [id number] [facility] [id number]  [place] license # [id number]  Available via [employer]

## 2023-08-13 ENCOUNTER — HOSPITAL ENCOUNTER (INPATIENT)
Facility: HOSPITAL | Age: 73
LOS: 1 days | Discharge: LEFT BEFORE DISCHARGE | End: 2023-08-14
Attending: EMERGENCY MEDICINE | Admitting: INTERNAL MEDICINE
Payer: COMMERCIAL

## 2023-08-13 DIAGNOSIS — R73.9 HYPERGLYCEMIA: ICD-10-CM

## 2023-08-13 DIAGNOSIS — J18.9 PNEUMONIA: ICD-10-CM

## 2023-08-13 DIAGNOSIS — A41.9 SEPSIS (HCC): ICD-10-CM

## 2023-08-13 DIAGNOSIS — E87.1 HYPONATREMIA: Primary | ICD-10-CM

## 2023-08-13 PROCEDURE — 82948 REAGENT STRIP/BLOOD GLUCOSE: CPT

## 2023-08-13 PROCEDURE — 99285 EMERGENCY DEPT VISIT HI MDM: CPT

## 2023-08-14 ENCOUNTER — APPOINTMENT (EMERGENCY)
Dept: RADIOLOGY | Facility: HOSPITAL | Age: 73
End: 2023-08-14
Payer: COMMERCIAL

## 2023-08-14 ENCOUNTER — HOSPITAL ENCOUNTER (INPATIENT)
Facility: HOSPITAL | Age: 73
LOS: 1 days | Discharge: HOME HEALTH | End: 2023-08-16
Attending: EMERGENCY MEDICINE | Admitting: FAMILY MEDICINE
Payer: COMMERCIAL

## 2023-08-14 ENCOUNTER — APPOINTMENT (EMERGENCY)
Dept: CT IMAGING | Facility: HOSPITAL | Age: 73
End: 2023-08-14
Payer: COMMERCIAL

## 2023-08-14 VITALS
HEIGHT: 66 IN | WEIGHT: 140.65 LBS | OXYGEN SATURATION: 93 % | SYSTOLIC BLOOD PRESSURE: 107 MMHG | DIASTOLIC BLOOD PRESSURE: 64 MMHG | TEMPERATURE: 98.3 F | HEART RATE: 82 BPM | BODY MASS INDEX: 22.6 KG/M2 | RESPIRATION RATE: 20 BRPM

## 2023-08-14 DIAGNOSIS — R10.9 ABDOMINAL PAIN, UNSPECIFIED ABDOMINAL LOCATION: ICD-10-CM

## 2023-08-14 DIAGNOSIS — E86.0 DEHYDRATION: ICD-10-CM

## 2023-08-14 DIAGNOSIS — E87.1 HYPONATREMIA: ICD-10-CM

## 2023-08-14 DIAGNOSIS — J18.9 MULTIFOCAL PNEUMONIA: Primary | ICD-10-CM

## 2023-08-14 DIAGNOSIS — R41.82 ALTERED MENTAL STATUS: ICD-10-CM

## 2023-08-14 PROBLEM — E03.9 HYPOTHYROIDISM: Status: ACTIVE | Noted: 2023-08-14

## 2023-08-14 PROBLEM — R53.1 GENERALIZED WEAKNESS: Status: ACTIVE | Noted: 2023-08-14

## 2023-08-14 PROBLEM — J96.01 ACUTE RESPIRATORY FAILURE WITH HYPOXIA (HCC): Status: RESOLVED | Noted: 2023-08-14 | Resolved: 2023-08-14

## 2023-08-14 PROBLEM — J96.01 ACUTE RESPIRATORY FAILURE WITH HYPOXIA (HCC): Status: ACTIVE | Noted: 2023-08-14

## 2023-08-14 PROBLEM — E11.65 DIABETES MELLITUS WITH HYPERGLYCEMIA (HCC): Status: ACTIVE | Noted: 2023-08-14

## 2023-08-14 PROBLEM — Z86.718 PERSONAL HISTORY OF DVT (DEEP VEIN THROMBOSIS): Status: ACTIVE | Noted: 2023-08-14

## 2023-08-14 LAB
ALBUMIN SERPL BCP-MCNC: 2.6 G/DL (ref 3.5–5)
ALBUMIN SERPL BCP-MCNC: 2.7 G/DL (ref 3.5–5)
ALP SERPL-CCNC: 68 U/L (ref 34–104)
ALP SERPL-CCNC: 78 U/L (ref 34–104)
ALT SERPL W P-5'-P-CCNC: 31 U/L (ref 7–52)
ALT SERPL W P-5'-P-CCNC: 40 U/L (ref 7–52)
AMMONIA PLAS-SCNC: 30 UMOL/L (ref 18–72)
ANION GAP SERPL CALCULATED.3IONS-SCNC: 13 MMOL/L
ANION GAP SERPL CALCULATED.3IONS-SCNC: 8 MMOL/L
ANISOCYTOSIS BLD QL SMEAR: PRESENT
APTT PPP: 27 SECONDS (ref 23–37)
APTT PPP: 27 SECONDS (ref 23–37)
AST SERPL W P-5'-P-CCNC: 51 U/L (ref 13–39)
AST SERPL W P-5'-P-CCNC: 78 U/L (ref 13–39)
ATRIAL RATE: 101 BPM
BASE EX.OXY STD BLDV CALC-SCNC: 91.9 % (ref 60–80)
BASE EX.OXY STD BLDV CALC-SCNC: 95 % (ref 60–80)
BASE EXCESS BLDV CALC-SCNC: -2 MMOL/L
BASE EXCESS BLDV CALC-SCNC: 0.4 MMOL/L
BASOPHILS # BLD MANUAL: 0 THOUSAND/UL (ref 0–0.1)
BASOPHILS NFR MAR MANUAL: 0 % (ref 0–1)
BETA-HYDROXYBUTYRATE: 2.8 MMOL/L
BILIRUB SERPL-MCNC: 0.46 MG/DL (ref 0.2–1)
BILIRUB SERPL-MCNC: 0.47 MG/DL (ref 0.2–1)
BUN SERPL-MCNC: 22 MG/DL (ref 5–25)
BUN SERPL-MCNC: 27 MG/DL (ref 5–25)
CALCIUM ALBUM COR SERPL-MCNC: 9 MG/DL (ref 8.3–10.1)
CALCIUM ALBUM COR SERPL-MCNC: 9.2 MG/DL (ref 8.3–10.1)
CALCIUM SERPL-MCNC: 7.9 MG/DL (ref 8.4–10.2)
CALCIUM SERPL-MCNC: 8.2 MG/DL (ref 8.4–10.2)
CARDIAC TROPONIN I PNL SERPL HS: 7 NG/L
CHLORIDE SERPL-SCNC: 91 MMOL/L (ref 96–108)
CHLORIDE SERPL-SCNC: 93 MMOL/L (ref 96–108)
CK SERPL-CCNC: 74 U/L (ref 39–308)
CO2 SERPL-SCNC: 20 MMOL/L (ref 21–32)
CO2 SERPL-SCNC: 27 MMOL/L (ref 21–32)
CREAT SERPL-MCNC: 0.79 MG/DL (ref 0.6–1.3)
CREAT SERPL-MCNC: 0.84 MG/DL (ref 0.6–1.3)
EOSINOPHIL # BLD MANUAL: 0 THOUSAND/UL (ref 0–0.4)
EOSINOPHIL NFR BLD MANUAL: 0 % (ref 0–6)
ERYTHROCYTE [DISTWIDTH] IN BLOOD BY AUTOMATED COUNT: 13.2 % (ref 11.6–15.1)
ERYTHROCYTE [DISTWIDTH] IN BLOOD BY AUTOMATED COUNT: 13.4 % (ref 11.6–15.1)
EST. AVERAGE GLUCOSE BLD GHB EST-MCNC: 321 MG/DL
FLUAV RNA RESP QL NAA+PROBE: NEGATIVE
FLUBV RNA RESP QL NAA+PROBE: NEGATIVE
GFR SERPL CREATININE-BSD FRML MDRD: 86 ML/MIN/1.73SQ M
GFR SERPL CREATININE-BSD FRML MDRD: 88 ML/MIN/1.73SQ M
GLUCOSE SERPL-MCNC: 361 MG/DL (ref 65–140)
GLUCOSE SERPL-MCNC: 361 MG/DL (ref 65–140)
GLUCOSE SERPL-MCNC: 378 MG/DL (ref 65–140)
GLUCOSE SERPL-MCNC: 91 MG/DL (ref 65–140)
GLUCOSE SERPL-MCNC: 97 MG/DL (ref 65–140)
HBA1C MFR BLD: 12.8 %
HCO3 BLDV-SCNC: 21.4 MMOL/L (ref 24–30)
HCO3 BLDV-SCNC: 23.2 MMOL/L (ref 24–30)
HCT VFR BLD AUTO: 27.2 % (ref 36.5–49.3)
HCT VFR BLD AUTO: 39.6 % (ref 36.5–49.3)
HGB BLD-MCNC: 13.4 G/DL (ref 12–17)
HGB BLD-MCNC: 9.2 G/DL (ref 12–17)
INR PPP: 1.01 (ref 0.84–1.19)
INR PPP: 1.12 (ref 0.84–1.19)
LACTATE SERPL-SCNC: 1.6 MMOL/L (ref 0.5–2)
LYMPHOCYTES # BLD AUTO: 0.13 THOUSAND/UL (ref 0.6–4.47)
LYMPHOCYTES # BLD AUTO: 1 % (ref 14–44)
MAGNESIUM SERPL-MCNC: 2 MG/DL (ref 1.9–2.7)
MCH RBC QN AUTO: 31.5 PG (ref 26.8–34.3)
MCH RBC QN AUTO: 32.3 PG (ref 26.8–34.3)
MCHC RBC AUTO-ENTMCNC: 33.8 G/DL (ref 31.4–37.4)
MCHC RBC AUTO-ENTMCNC: 33.8 G/DL (ref 31.4–37.4)
MCV RBC AUTO: 93 FL (ref 82–98)
MCV RBC AUTO: 95 FL (ref 82–98)
METAMYELOCYTES NFR BLD MANUAL: 2 % (ref 0–1)
MONOCYTES # BLD AUTO: 0.13 THOUSAND/UL (ref 0–1.22)
MONOCYTES NFR BLD: 1 % (ref 4–12)
NEUTROPHILS # BLD MANUAL: 12.48 THOUSAND/UL (ref 1.85–7.62)
NEUTS BAND NFR BLD MANUAL: 2 % (ref 0–8)
NEUTS SEG NFR BLD AUTO: 94 % (ref 43–75)
NRBC BLD AUTO-RTO: 0 /100 WBCS
O2 CT BLDV-SCNC: 21.3 ML/DL
O2 CT BLDV-SCNC: 23.1 ML/DL
OSMOLALITY UR/SERPL-RTO: 290 MMOL/KG (ref 282–298)
P AXIS: 68 DEGREES
PCO2 BLDV: 33 MM HG (ref 42–50)
PCO2 BLDV: 33 MM HG (ref 42–50)
PH BLDV: 7.43 [PH] (ref 7.3–7.4)
PH BLDV: 7.46 [PH] (ref 7.3–7.4)
PLATELET # BLD AUTO: 243 THOUSANDS/UL (ref 149–390)
PLATELET # BLD AUTO: 359 THOUSANDS/UL (ref 149–390)
PLATELET BLD QL SMEAR: ABNORMAL
PMV BLD AUTO: 9.7 FL (ref 8.9–12.7)
PMV BLD AUTO: 9.7 FL (ref 8.9–12.7)
PO2 BLDV: 59.8 MM HG (ref 35–45)
PO2 BLDV: 96.9 MM HG (ref 35–45)
POIKILOCYTOSIS BLD QL SMEAR: PRESENT
POTASSIUM SERPL-SCNC: 3.7 MMOL/L (ref 3.5–5.3)
POTASSIUM SERPL-SCNC: 4.3 MMOL/L (ref 3.5–5.3)
PR INTERVAL: 174 MS
PROCALCITONIN SERPL-MCNC: 5.75 NG/ML
PROCALCITONIN SERPL-MCNC: 5.94 NG/ML
PROT SERPL-MCNC: 6 G/DL (ref 6.4–8.4)
PROT SERPL-MCNC: 6.2 G/DL (ref 6.4–8.4)
PROTHROMBIN TIME: 13.9 SECONDS (ref 11.6–14.5)
PROTHROMBIN TIME: 15.1 SECONDS (ref 11.6–14.5)
QRS AXIS: 67 DEGREES
QRSD INTERVAL: 82 MS
QT INTERVAL: 348 MS
QTC INTERVAL: 451 MS
RBC # BLD AUTO: 2.85 MILLION/UL (ref 3.88–5.62)
RBC # BLD AUTO: 4.25 MILLION/UL (ref 3.88–5.62)
RBC MORPH BLD: PRESENT
RSV RNA RESP QL NAA+PROBE: NEGATIVE
SARS-COV-2 RNA RESP QL NAA+PROBE: NEGATIVE
SODIUM SERPL-SCNC: 124 MMOL/L (ref 135–147)
SODIUM SERPL-SCNC: 128 MMOL/L (ref 135–147)
T WAVE AXIS: 78 DEGREES
TSH SERPL DL<=0.05 MIU/L-ACNC: 2.92 UIU/ML (ref 0.45–4.5)
VENTRICULAR RATE: 101 BPM
WBC # BLD AUTO: 12.54 THOUSAND/UL (ref 4.31–10.16)
WBC # BLD AUTO: 7.94 THOUSAND/UL (ref 4.31–10.16)

## 2023-08-14 PROCEDURE — 85610 PROTHROMBIN TIME: CPT | Performed by: EMERGENCY MEDICINE

## 2023-08-14 PROCEDURE — 93005 ELECTROCARDIOGRAM TRACING: CPT

## 2023-08-14 PROCEDURE — 82550 ASSAY OF CK (CPK): CPT | Performed by: EMERGENCY MEDICINE

## 2023-08-14 PROCEDURE — G1004 CDSM NDSC: HCPCS

## 2023-08-14 PROCEDURE — 83930 ASSAY OF BLOOD OSMOLALITY: CPT

## 2023-08-14 PROCEDURE — 85007 BL SMEAR W/DIFF WBC COUNT: CPT | Performed by: EMERGENCY MEDICINE

## 2023-08-14 PROCEDURE — NC001 PR NO CHARGE: Performed by: EMERGENCY MEDICINE

## 2023-08-14 PROCEDURE — 82140 ASSAY OF AMMONIA: CPT | Performed by: EMERGENCY MEDICINE

## 2023-08-14 PROCEDURE — 93010 ELECTROCARDIOGRAM REPORT: CPT | Performed by: INTERNAL MEDICINE

## 2023-08-14 PROCEDURE — 80053 COMPREHEN METABOLIC PANEL: CPT | Performed by: EMERGENCY MEDICINE

## 2023-08-14 PROCEDURE — 99285 EMERGENCY DEPT VISIT HI MDM: CPT | Performed by: EMERGENCY MEDICINE

## 2023-08-14 PROCEDURE — 71045 X-RAY EXAM CHEST 1 VIEW: CPT

## 2023-08-14 PROCEDURE — 84145 PROCALCITONIN (PCT): CPT | Performed by: EMERGENCY MEDICINE

## 2023-08-14 PROCEDURE — 85730 THROMBOPLASTIN TIME PARTIAL: CPT | Performed by: EMERGENCY MEDICINE

## 2023-08-14 PROCEDURE — 85027 COMPLETE CBC AUTOMATED: CPT

## 2023-08-14 PROCEDURE — 87040 BLOOD CULTURE FOR BACTERIA: CPT | Performed by: EMERGENCY MEDICINE

## 2023-08-14 PROCEDURE — 83036 HEMOGLOBIN GLYCOSYLATED A1C: CPT

## 2023-08-14 PROCEDURE — 82948 REAGENT STRIP/BLOOD GLUCOSE: CPT

## 2023-08-14 PROCEDURE — 96361 HYDRATE IV INFUSION ADD-ON: CPT

## 2023-08-14 PROCEDURE — 70450 CT HEAD/BRAIN W/O DYE: CPT

## 2023-08-14 PROCEDURE — 82805 BLOOD GASES W/O2 SATURATION: CPT | Performed by: EMERGENCY MEDICINE

## 2023-08-14 PROCEDURE — 0241U HB NFCT DS VIR RESP RNA 4 TRGT: CPT | Performed by: EMERGENCY MEDICINE

## 2023-08-14 PROCEDURE — 85027 COMPLETE CBC AUTOMATED: CPT | Performed by: EMERGENCY MEDICINE

## 2023-08-14 PROCEDURE — 36415 COLL VENOUS BLD VENIPUNCTURE: CPT | Performed by: EMERGENCY MEDICINE

## 2023-08-14 PROCEDURE — 83605 ASSAY OF LACTIC ACID: CPT | Performed by: EMERGENCY MEDICINE

## 2023-08-14 PROCEDURE — 84484 ASSAY OF TROPONIN QUANT: CPT | Performed by: EMERGENCY MEDICINE

## 2023-08-14 PROCEDURE — 96365 THER/PROPH/DIAG IV INF INIT: CPT

## 2023-08-14 PROCEDURE — 83735 ASSAY OF MAGNESIUM: CPT | Performed by: EMERGENCY MEDICINE

## 2023-08-14 PROCEDURE — 83880 ASSAY OF NATRIURETIC PEPTIDE: CPT | Performed by: EMERGENCY MEDICINE

## 2023-08-14 PROCEDURE — 99285 EMERGENCY DEPT VISIT HI MDM: CPT

## 2023-08-14 PROCEDURE — 84443 ASSAY THYROID STIM HORMONE: CPT | Performed by: EMERGENCY MEDICINE

## 2023-08-14 PROCEDURE — 82010 KETONE BODYS QUAN: CPT | Performed by: EMERGENCY MEDICINE

## 2023-08-14 PROCEDURE — 85025 COMPLETE CBC W/AUTO DIFF WBC: CPT | Performed by: EMERGENCY MEDICINE

## 2023-08-14 RX ORDER — INSULIN LISPRO 100 [IU]/ML
10 INJECTION, SOLUTION INTRAVENOUS; SUBCUTANEOUS ONCE
Status: COMPLETED | OUTPATIENT
Start: 2023-08-14 | End: 2023-08-14

## 2023-08-14 RX ORDER — INSULIN ASPART 100 [IU]/ML
14 INJECTION, SOLUTION INTRAVENOUS; SUBCUTANEOUS
COMMUNITY

## 2023-08-14 RX ORDER — ACETAMINOPHEN 325 MG/1
650 TABLET ORAL EVERY 6 HOURS PRN
Status: DISCONTINUED | OUTPATIENT
Start: 2023-08-14 | End: 2023-08-14 | Stop reason: HOSPADM

## 2023-08-14 RX ORDER — AZITHROMYCIN 500 MG/1
500 TABLET, FILM COATED ORAL EVERY 24 HOURS
Qty: 3 TABLET | Refills: 0 | Status: SHIPPED | OUTPATIENT
Start: 2023-08-15 | End: 2023-08-16

## 2023-08-14 RX ORDER — CEFDINIR 300 MG/1
300 CAPSULE ORAL EVERY 12 HOURS SCHEDULED
Qty: 10 CAPSULE | Refills: 0 | Status: SHIPPED | OUTPATIENT
Start: 2023-08-14 | End: 2023-08-14 | Stop reason: SDUPTHER

## 2023-08-14 RX ORDER — INSULIN GLARGINE 100 [IU]/ML
40 INJECTION, SOLUTION SUBCUTANEOUS
Status: DISCONTINUED | OUTPATIENT
Start: 2023-08-14 | End: 2023-08-14

## 2023-08-14 RX ORDER — ACETAMINOPHEN 325 MG/1
975 TABLET ORAL ONCE
Status: COMPLETED | OUTPATIENT
Start: 2023-08-14 | End: 2023-08-14

## 2023-08-14 RX ORDER — INSULIN GLARGINE 100 [IU]/ML
40 INJECTION, SOLUTION SUBCUTANEOUS
COMMUNITY

## 2023-08-14 RX ORDER — INSULIN LISPRO 100 [IU]/ML
1-5 INJECTION, SOLUTION INTRAVENOUS; SUBCUTANEOUS
Status: DISCONTINUED | OUTPATIENT
Start: 2023-08-14 | End: 2023-08-14 | Stop reason: HOSPADM

## 2023-08-14 RX ORDER — AZITHROMYCIN 500 MG/1
500 TABLET, FILM COATED ORAL EVERY 24 HOURS
Qty: 3 TABLET | Refills: 0 | Status: SHIPPED | OUTPATIENT
Start: 2023-08-15 | End: 2023-08-14 | Stop reason: SDUPTHER

## 2023-08-14 RX ORDER — LEVOTHYROXINE SODIUM 112 UG/1
112 TABLET ORAL
Status: DISCONTINUED | OUTPATIENT
Start: 2023-08-14 | End: 2023-08-14 | Stop reason: HOSPADM

## 2023-08-14 RX ORDER — INSULIN GLARGINE 100 [IU]/ML
40 INJECTION, SOLUTION SUBCUTANEOUS
Status: DISCONTINUED | OUTPATIENT
Start: 2023-08-14 | End: 2023-08-14 | Stop reason: HOSPADM

## 2023-08-14 RX ORDER — INSULIN LISPRO 100 [IU]/ML
14 INJECTION, SOLUTION INTRAVENOUS; SUBCUTANEOUS
Status: DISCONTINUED | OUTPATIENT
Start: 2023-08-14 | End: 2023-08-14 | Stop reason: HOSPADM

## 2023-08-14 RX ORDER — SODIUM CHLORIDE 9 MG/ML
100 INJECTION, SOLUTION INTRAVENOUS CONTINUOUS
Status: DISCONTINUED | OUTPATIENT
Start: 2023-08-14 | End: 2023-08-14 | Stop reason: HOSPADM

## 2023-08-14 RX ORDER — LEVALBUTEROL 1.25 MG/.5ML
1.25 SOLUTION, CONCENTRATE RESPIRATORY (INHALATION) EVERY 8 HOURS PRN
Status: DISCONTINUED | OUTPATIENT
Start: 2023-08-14 | End: 2023-08-14 | Stop reason: HOSPADM

## 2023-08-14 RX ORDER — CHOLESTYRAMINE LIGHT 4 G/5.7G
4 POWDER, FOR SUSPENSION ORAL 2 TIMES DAILY WITH MEALS
Status: DISCONTINUED | OUTPATIENT
Start: 2023-08-14 | End: 2023-08-14 | Stop reason: HOSPADM

## 2023-08-14 RX ORDER — SODIUM CHLORIDE 9 MG/ML
100 INJECTION, SOLUTION INTRAVENOUS CONTINUOUS
Status: DISCONTINUED | OUTPATIENT
Start: 2023-08-14 | End: 2023-08-15

## 2023-08-14 RX ORDER — PYRIDOSTIGMINE BROMIDE 60 MG/1
60 TABLET ORAL 4 TIMES DAILY
Status: DISCONTINUED | OUTPATIENT
Start: 2023-08-14 | End: 2023-08-14 | Stop reason: HOSPADM

## 2023-08-14 RX ORDER — AZITHROMYCIN 250 MG/1
500 TABLET, FILM COATED ORAL EVERY 24 HOURS
Status: DISCONTINUED | OUTPATIENT
Start: 2023-08-14 | End: 2023-08-14 | Stop reason: HOSPADM

## 2023-08-14 RX ORDER — CEFDINIR 300 MG/1
300 CAPSULE ORAL EVERY 12 HOURS SCHEDULED
Qty: 10 CAPSULE | Refills: 0 | Status: SHIPPED | OUTPATIENT
Start: 2023-08-14 | End: 2023-08-16

## 2023-08-14 RX ORDER — PRAVASTATIN SODIUM 40 MG
40 TABLET ORAL
Status: DISCONTINUED | OUTPATIENT
Start: 2023-08-14 | End: 2023-08-14 | Stop reason: HOSPADM

## 2023-08-14 RX ORDER — PANTOPRAZOLE SODIUM 40 MG/1
40 TABLET, DELAYED RELEASE ORAL
Status: DISCONTINUED | OUTPATIENT
Start: 2023-08-14 | End: 2023-08-14 | Stop reason: HOSPADM

## 2023-08-14 RX ORDER — NICOTINE 21 MG/24HR
1 PATCH, TRANSDERMAL 24 HOURS TRANSDERMAL DAILY
Status: DISCONTINUED | OUTPATIENT
Start: 2023-08-14 | End: 2023-08-14 | Stop reason: HOSPADM

## 2023-08-14 RX ADMIN — CEFEPIME 1000 MG: 1 INJECTION, POWDER, FOR SOLUTION INTRAMUSCULAR; INTRAVENOUS at 23:51

## 2023-08-14 RX ADMIN — INSULIN LISPRO 10 UNITS: 100 INJECTION, SOLUTION INTRAVENOUS; SUBCUTANEOUS at 04:52

## 2023-08-14 RX ADMIN — SODIUM CHLORIDE 100 ML/HR: 0.9 INJECTION, SOLUTION INTRAVENOUS at 04:48

## 2023-08-14 RX ADMIN — CEFTRIAXONE SODIUM 1000 MG: 10 INJECTION, POWDER, FOR SOLUTION INTRAVENOUS at 00:57

## 2023-08-14 RX ADMIN — SODIUM CHLORIDE 250 ML: 0.9 INJECTION, SOLUTION INTRAVENOUS at 22:51

## 2023-08-14 RX ADMIN — SODIUM CHLORIDE 1000 ML: 0.9 INJECTION, SOLUTION INTRAVENOUS at 01:58

## 2023-08-14 RX ADMIN — AZITHROMYCIN DIHYDRATE 500 MG: 250 TABLET, FILM COATED ORAL at 04:55

## 2023-08-14 RX ADMIN — INSULIN GLARGINE 40 UNITS: 100 INJECTION, SOLUTION SUBCUTANEOUS at 04:51

## 2023-08-14 RX ADMIN — ACETAMINOPHEN 975 MG: 325 TABLET, FILM COATED ORAL at 00:26

## 2023-08-14 RX ADMIN — SODIUM CHLORIDE 100 ML/HR: 0.9 INJECTION, SOLUTION INTRAVENOUS at 22:56

## 2023-08-14 NOTE — ASSESSMENT & PLAN NOTE
· Baseline: Uses walker or cane. Occasionally walks without device. · Reports generalized weakness and some lethargy. Likely in the setting of acute infection.   · Patient agrees on rehab placement if needed    · PT/OT eval and treat  · At discharge, encouraged to follow-up with PCP for further needs

## 2023-08-14 NOTE — ASSESSMENT & PLAN NOTE
· Corrected sodium 128, for hyperglycemia of 378  · Likely in the setting of poor p.o. intake since yesterday morning given feeling sick  · Received 1 L bolus of NS in ED  · Serum osmolality 290    · urine osmolality, urine sodium-pending  · Continue IVF for now  · A.m. BMP  · At discharge, encourage oral intake of food and proper hydration

## 2023-08-14 NOTE — DISCHARGE INSTR - AVS FIRST PAGE
Dear Mayra Diaz,     It was our pleasure to care for you here at [facility]. It is our hope that we were always able to exceed the expected standards for your care during your stay. You were hospitalized due to Sepsis from lung infection/pneumonia. You were cared for on the ED floor by Haylee Iverson MD under the service of Lucita Cherry MD with the [facility] who covers for your primary care physician (PCP), Wendy Barton MD, while you were hospitalized. If you have any questions or concerns related to this hospitalization, you may contact us at [phone]. For follow up as well as any medication refills, we recommend that you follow up with your primary care physician. A registered nurse will reach out to you by phone within a few days after your discharge to answer any additional questions that you may have after going home. However, at this time we provide for you here, the most important instructions / recommendations at discharge:     Notable Medication Adjustments -   Continue antibiotic therapy with azitrhomycin for 3 days and cefdinir for 5 days   Testing Required after Discharge -   None at this time   Important follow up information -   You are leaving [facility]) and you are at high risk of worsening infection, therefore call your PCP ASAP to schedule a follow-up appointment   Other Instructions -   None at this time   Please review this entire after visit summary as additional general instructions including medication list, appointments, activity, diet, any pertinent wound care, and other additional recommendations from your care team that may be provided for you.       Sincerely,     Haylee Iverson MD

## 2023-08-14 NOTE — ASSESSMENT & PLAN NOTE
· CC: Shortness of breath with weakness and lethargy  · Noted on CXR - right lower lobe consolidation, likely CAP  · Met sepsis criteria on presentation  · No recent hospitalizations, no sick contacts    · ABX as noted above  · Urine Legionella  · Collect sputum culture  · Rest of plan as noted above

## 2023-08-14 NOTE — ASSESSMENT & PLAN NOTE
· Corrected sodium 128, for hyperglycemia of 378  · Likely in the setting of poor p.o. intake since yesterday morning given feeling sick  · Received 1 L bolus of NS in ED    · Serum osmolality, urine osmolality, urine sodium  · Continue IVF for now  · A.m. BMP

## 2023-08-14 NOTE — ASSESSMENT & PLAN NOTE
· POA, febrile 100.7, tachycardic 105, WBC 13, most likely source PNA  · CC: Shortness of breath associated with weakness and lethargy. Addressed diarrhea however adds that this is chronic since treatment for his prostate cancer. Denies urinary symptoms. · Procalcitonin elevation at 5.75. Lactic acid within normal limits.   4 Plex negative  · Received 1 L bolus of NS and x1 dose of ceftriaxone in ED    · Follow blood cultures  · Collect sputum cultures  · Check UA for completeness of sepsis work-up  · Continue ceftriaxone and azithromycin  · Urine Legionella, if negative discontinue azithromycin  · A.m. CBC, procalcitonin  · trend fever curves  · Continue IV fluids 100 cc/h  · Patient's request to leave AMA, discharged patient on azithromycin x3 days and cefdinir x5 days

## 2023-08-14 NOTE — ASSESSMENT & PLAN NOTE
· Reports he has a history of DVT and PE  · Home regimen includes rivaroxaban 20 mg daily.      · Continue home regimen

## 2023-08-14 NOTE — ASSESSMENT & PLAN NOTE
· Home regimen includes levothyroxine 112 mcg twice a day as per chart review  · Patient unsure of home medications as roommate helps with meds. Attempted call to roommate but could not reach.     · We will need to call patient's roommate to confirm home regimen  · Continue levothyroxine 112 mcg daily for now until home regimen is confirmed

## 2023-08-14 NOTE — ASSESSMENT & PLAN NOTE
· Baseline: Uses walker or cane. Occasionally walks without device. · Reports generalized weakness and some lethargy. Likely in the setting of acute infection.   · Patient agrees on rehab placement if needed    · PT/OT eval and treat

## 2023-08-14 NOTE — ASSESSMENT & PLAN NOTE
Lab Results   Component Value Date    HGBA1C 8.7 (H) 05/28/2020     Recent Labs     08/13/23  0134   POCGLU 361*   Blood Sugar Average: Last 72 hrs:  (P) 361     · Home regimen: Glipizide, saxagliptin, NovoLog 14 units with meals, and glargine 40 units at bedtime  · Attempted to call patient's roommate will help with medications to confirm but unable to reach  · Hyperglycemia at 378 on presentation, possibly given illness and setting of acute infection +/- use of home meds the last day  · Beta hydroxybutyrate 2.8, no anion gap elevation    · Hold oral hypoglycemics  · Give x1 Humalog 10 units now for hyperglycemia  · Continue home regimen with Humalog 14 units with meals and glargine 40 units at bedtime  · SSI before meals and at bedtime with POCT Glc  · Glycemia protocol  · Diabetic diet  · Check Hb A1c

## 2023-08-14 NOTE — DISCHARGE SUMMARY
[address] [facility]  Discharge- Jac Herb 1950, 74 y.o. male MRN: [id number]  Unit/Bed#: ED-17 Encounter: [id number]  Primary Care Provider: Otis Campos MD   Date and time admitted to hospital: 8/13/2023 11:40 PM    * Sepsis [facility])  Assessment & Plan  · POA, febrile 100.7, tachycardic 105, WBC 13, most likely source PNA  · CC: Shortness of breath associated with weakness and lethargy. Addressed diarrhea however adds that this is chronic since treatment for his prostate cancer. Denies urinary symptoms. · Procalcitonin elevation at 5.75. Lactic acid within normal limits. 4 Plex negative  · Received 1 L bolus of NS and x1 dose of ceftriaxone in ED    · Follow blood cultures  · Collect sputum cultures  · Check UA for completeness of sepsis work-up  · Continue ceftriaxone and azithromycin  · Urine Legionella, if negative discontinue azithromycin  · A.m. CBC, procalcitonin  · trend fever curves  · Continue IV fluids 100 cc/h  · Patient's request to leave AMA, discharged patient on azithromycin x3 days and cefdinir x5 days    Acute respiratory failure with hypoxia (HCC)-resolved as of 8/14/2023  Assessment & Plan  · 2/2 PNA of RLL  · Chronic smoker, half a pack per day  · Satting 80s on room air, found on 4 L satting in mid 90s.   · VBG without hypercapnia  · Baseline is room air  · Was weaned to room air    · Wean O2 as tolerated  · Management of PNA as noted  · Incentive spirometry  · Encourage smoking cessation, nicotine replacement    PNA (pneumonia)  Assessment & Plan  · CC: Shortness of breath with weakness and lethargy  · Noted on CXR - right lower lobe consolidation, likely CAP  · Met sepsis criteria on presentation  · No recent hospitalizations, no sick contacts    · ABX as noted above  · Urine Legionella  · Collect sputum culture  · Rest of plan as noted above    Hyponatremia  Assessment & Plan  · Corrected sodium 128, for hyperglycemia of 378  · Likely in the setting of poor p.o. intake since yesterday morning given feeling sick  · Received 1 L bolus of NS in ED  · Serum osmolality 290    · urine osmolality, urine sodium-pending  · Continue IVF for now  · A.m. BMP  · At discharge, encourage oral intake of food and proper hydration    Diabetes mellitus with hyperglycemia ([address])  Assessment & Plan  Lab Results   Component Value Date    HGBA1C 8.7 (H) 05/28/2020     Recent Labs     08/13/23  2359 08/14/23  0439   POCGLU 361* 361*   Blood Sugar Average: Last 72 hrs:  (P) 361     · Home regimen: Glipizide, saxagliptin, NovoLog 14 units with meals, and glargine 40 units at bedtime  · Attempted to call patient's roommate will help with medications to confirm but unable to reach  · Hyperglycemia at 378 on presentation, possibly given illness and setting of acute infection +/- use of home meds the last day  · Beta hydroxybutyrate 2.8, no anion gap elevation    · Hold oral hypoglycemics  · Give x1 Humalog 10 units now for hyperglycemia  · Continue home regimen with Humalog 14 units with meals and glargine 40 units at bedtime  · SSI before meals and at bedtime with POCT Glc  · Glycemia protocol  · Diabetic diet  · Check Hb A1c  · Continue home regimen at discharge    Hypothyroidism  Assessment & Plan  · Home regimen includes levothyroxine 112 mcg twice a day as per chart review  · Patient unsure of home medications as roommate helps with meds. Attempted call to roommate but could not reach. · Continue home regimen at discharge    Generalized weakness  Assessment & Plan  · Baseline: Uses walker or cane. Occasionally walks without device. · Reports generalized weakness and some lethargy. Likely in the setting of acute infection.   · Patient agrees on rehab placement if needed    · PT/OT eval and treat  · At discharge, encouraged to follow-up with PCP for further needs    Personal history of DVT and PE  Assessment & Plan  · Reports he has a history of DVT and PE  · Home regimen includes rivaroxaban 20 mg daily. · Continue home regimen    Medical Problems     Resolved Problems  Date Reviewed: 8/14/2023          Resolved    Acute respiratory failure with hypoxia ([address]) 8/14/2023     Resolved by  Idalmis Garcia MD        Discharging Resident: Idalmis Garcia MD  Discharging Attending: Monica Canales MD  PCP: Ian Gamble MD  Admission Date:   Admission Orders (From admission, onward)     Ordered        08/14/23 0144  INPATIENT ADMISSION  Once                      Discharge Date: 08/14/23    Consultations During Hospital Stay:  · None     Procedures Performed:   · None    Significant Findings / Test Results:   · CXR with right lower lobe consolidation suggestive of pneumonia (pending official read). Test Results Pending at Discharge (will require follow up): · Blood cultures x2- patient can follow-up with PCP     Outpatient Tests Requested:  · None    Complications: None at this time. But is at risk presenting infection given the patient arrived meeting criteria. This was discussed with patient who requested to leave AMA. Reason for Admission: Shortness of breath    Hospital Course:   Rajani Batista is a 74 y.o. male patient who originally presented to the hospital on 8/13/2023 due to chief complaint of shortness of breath. In the setting of acute pneumonia. Met sepsis criteria on presentation, and was in acute respiratory failure with hypoxia requiring 4 L of oxygen. Also labs demonstrated hyponatremia with hyperglycemia. Patient was managed with IV antibiotics and IV fluids. Further lab work in place including blood cultures and plans for sputum culture collection. However, patient verbally aggressive using foul language towards RN and this provider requesting to be discharged. Found patient on room air. I spoke with patient to address his concerns given that earlier he was very pleasant and appeared significantly acutely ill.   He continued to be verbally aggressive and reported no interest in being admitted for further treatments. I discussed with patient that he arrived very ill and that he requires inpatient management and that if he leaves he is at great risk of worsening infection including spread of infection and further metabolic/electrolyte derangements which included worsening lethargy and risk of seizures. He confirmed understanding and continued to report that he has no interest of being in the hospital and requested to leave [address] [place] [address]. Patient was fully oriented and demonstrated capacity to make his own decisions. Therefore, patient was discharged AMA. The patient, initially admitted to the hospital as inpatient, was discharged earlier than expected given the following: Patient requesting discharge, leaving AM. Please see above list of diagnoses and related plan for additional information. Condition at Discharge: guarded    Discharge Day Visit / Exam:   * Please refer to separate progress note for these details *    Discussion with Family: Patient declined call to . Discharge instructions/Information to patient and family:   See after visit summary for information provided to patient and family. Provisions for Follow-Up Care:  See after visit summary for information related to follow-up care and any pertinent home health orders. Disposition:   Other: AMA (against medical advice)    Planned Readmission: None    Discharge Medications:  See after visit summary for reconciled discharge medications provided to patient and/or family.       **Please Note: This note may have been constructed using a voice recognition system**

## 2023-08-14 NOTE — ASSESSMENT & PLAN NOTE
· POA, febrile 100.7, tachycardic 105, WBC 13, most likely source PNA  · CC: Shortness of breath associated with weakness and lethargy. Addressed diarrhea however adds that this is chronic since treatment for his prostate cancer. Denies urinary symptoms. · Procalcitonin elevation at 5.75. Lactic acid within normal limits.   4 Plex negative  · Received 1 L bolus of NS and x1 dose of ceftriaxone in ED    · Follow blood cultures  · Collect sputum cultures  · Check UA for completeness of sepsis work-up  · Continue ceftriaxone and azithromycin  · Urine Legionella, if negative discontinue azithromycin  · A.m. CBC, procalcitonin  · trend fever curves  · Continue IV fluids 100 cc/h

## 2023-08-14 NOTE — ED PROVIDER NOTES
History  Chief Complaint   Patient presents with   • Hyperglycemia - Symptomatic     Pt arrived via EMS from home for hyperglycemia ( 333 for EMS) lethargy and weakness. Per roommate pt has not been taking his meds as prescribed    • Altered Mental Status     HPI     Patient presents for evaluation of lethargy and weakness as well as hyperglycemia at home. He has a history of insulin-dependent diabetes. He reports compliance of medications. He reports generalized weakness, cough, diarrhea. He lives with his roommate who called EMS due to patient's lethargy and weakness. Patient denies any falls or trauma. He reports compliance with medications. He denies any aggravating or alleviating symptoms. Prior to Admission Medications   Prescriptions Last Dose Informant Patient Reported? Taking?    Atropine Sulfate 0.25 MG/5ML SOSY  Self Yes No   Sig: Take 0.25 mg by mouth 3 (three) times a day as needed   Cholecalciferol (VITAMIN D3) 50 MCG (2000 UT) capsule  Self Yes No   Sig: Take 2,000 Units by mouth   Cyanocobalamin 1000 MCG SUBL  Self Yes No   Sig: Place 1,000 mcg under the tongue daily 4 times a week   acetaminophen (TYLENOL) 325 mg tablet  Self Yes No   Sig: Take 325 mg by mouth continuous as needed   carbidopa-levodopa (SINEMET)  mg per tablet   No No   Sig: Take 1 tablet by mouth 3 (three) times a day   cholestyramine (QUESTRAN) 4 g packet  Self No No   Sig: Take 1 packet (4 g total) by mouth 2 (two) times a day with meals   folic acid (FOLVITE) 1 mg tablet  Self Yes No   Sig: Take 1 mg by mouth 2 (two) times a day   glipiZIDE (GLUCOTROL XL) 5 mg 24 hr tablet  Self Yes No   Sig: Take 5 mg by mouth   levothyroxine 112 mcg tablet  Self Yes No   Sig: Take 112 mcg by mouth 2 (two) times a day   pantoprazole (PROTONIX) 40 mg tablet  Self No No   Sig: Take 1 tablet (40 mg total) by mouth daily in the early morning   Patient taking differently: Take 40 mg by mouth as needed    pyridostigmine (MESTINON) 60 mg tablet  Self Yes No   Sig: Take 60 mg by mouth 4 (four) times a day    saxagliptin (ONGLYZA) 5 MG tablet  Self Yes No   Sig: Take 5 mg by mouth daily   sildenafil (VIAGRA) 100 mg tablet  Self Yes No   Sig: Take 100 mg by mouth as needed    simvastatin (ZOCOR) 20 mg tablet  Self Yes No   Sig: Take 10 mg by mouth daily Ordered as 20mg pt takes half a tab      Facility-Administered Medications: None       Past Medical History:   Diagnosis Date   • Diabetes mellitus (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 E-Cigarette/Vaping   • E-Cigarette Use Never User      E-Cigarette/Vaping Substances   • Nicotine No    • THC No    • CBD No    • Flavoring No    • Other No    • Unknown No      Social History     Tobacco Use   • Smoking status: Every Day     Packs/day: 0.50     Years: 61.00     Total pack years: 30.50     Types: Cigarettes   • Smokeless tobacco: Never   Vaping Use   • Vaping Use: Never used   Substance Use Topics   • Alcohol use: Not Currently   • Drug use: Not Currently       Review of Systems   Constitutional: Positive for fatigue and fever. Respiratory: Positive for cough. Negative for shortness of breath. Gastrointestinal: Positive for diarrhea. Neurological: Positive for weakness. All other systems reviewed and are negative. Physical Exam  Physical Exam  Vitals and nursing note reviewed. Constitutional:       General: He is not in acute distress. Appearance: He is well-developed. He is ill-appearing. He is not diaphoretic. HENT:      Head: Normocephalic and atraumatic.       Right Ear: External ear normal.      Left Ear: External ear normal.   Eyes:      General:         Right eye: No discharge. Left eye: No discharge. Pupils: Pupils are equal, round, and reactive to light. Neck:      Thyroid: No thyromegaly. Trachea: No tracheal deviation. Cardiovascular:      Rate and Rhythm: Regular rhythm. Tachycardia present. Heart sounds: No murmur heard. Pulmonary:      Effort: Pulmonary effort is normal.      Breath sounds: Normal breath sounds. Abdominal:      General: Bowel sounds are normal. There is no distension. Palpations: Abdomen is soft. Tenderness: There is no abdominal tenderness. Musculoskeletal:         General: No deformity. Normal range of motion. Cervical back: Normal range of motion and neck supple. Skin:     General: Skin is warm. Capillary Refill: Capillary refill takes less than 2 seconds. Neurological:      Mental Status: He is alert and oriented to person, place, and time. Cranial Nerves: No cranial nerve deficit. Motor: No abnormal muscle tone.    Psychiatric:         Behavior: Behavior normal.         Vital Signs  ED Triage Vitals   Temperature Pulse Respirations Blood Pressure SpO2   08/13/23 2352 08/13/23 2352 08/13/23 2352 08/13/23 2352 08/13/23 2352   (S) (!) 100.7 °F (38.2 °C) 105 22 110/58 (!) 89 %      Temp Source Heart Rate Source Patient Position - Orthostatic VS BP Location FiO2 (%)   08/13/23 2352 08/13/23 2352 08/13/23 2352 08/13/23 2352 --   Oral Monitor Lying Right arm       Pain Score       08/14/23 0145       No Pain           Vitals:    08/14/23 0045 08/14/23 0115 08/14/23 0130 08/14/23 0145   BP: 111/58 100/55 95/51 92/57   Pulse: 97 91 91 90   Patient Position - Orthostatic VS:             Visual Acuity      ED Medications  Medications   acetaminophen (TYLENOL) tablet 975 mg (975 mg Oral Given 8/14/23 0026)   ceftriaxone (ROCEPHIN) 1 g/50 mL in dextrose IVPB (1,000 mg Intravenous New Bag 8/14/23 0057)       Diagnostic Studies  Results Reviewed     Procedure Component Value Units Date/Time    CBC and differential [[id number]]     Lab Status: No result Specimen: Blood     Blood gas, venous [[id number]]  (Abnormal) Collected: 08/14/23 0038    Lab Status: Final result Specimen: Blood from Arm, Right Updated: 08/14/23 0129     pH, Ben 7.429     pCO2, Ben 33.0 mm Hg      pO2, Ben 96.9 mm Hg      HCO3, Ben 21.4 mmol/L      Base Excess, Ben -2.0 mmol/L      O2 Content, Ben 21.3 ml/dL      O2 HGB, VENOUS 95.0 %     Blood culture #2 [[id number]] Collected: 08/14/23 0035    Lab Status: In process Specimen: Blood from Arm, Right Updated: 08/14/23 0114    CBC and differential [[id number]] Collected: 08/14/23 0017    Lab Status: Final result Specimen: Blood from Arm, Right Updated: 08/14/23 0111     WBC --     RBC --     Hemoglobin --     Hematocrit --     MCV --     MCH --     MCHC --     RDW --     MPV --     Platelets --    Narrative: This is an appended report. These results have been appended to a previously verified report. Protime-INR [[id number]]  (Abnormal) Collected: 08/14/23 0017    Lab Status: Final result Specimen: Blood from Arm, Right Updated: 08/14/23 0111     Protime 15.1 seconds      INR 1.12    APTT [[id number]]  (Normal) Collected: 08/14/23 0017    Lab Status: Final result Specimen: Blood from Arm, Right Updated: 08/14/23 0111     PTT 27 seconds     FLU/RSV/COVID - if FLU/RSV clinically relevant [[id number]]  (Normal) Collected: 08/14/23 0017    Lab Status: Final result Specimen: Nares from Nose Updated: 08/14/23 0109     SARS-CoV-2 Negative     INFLUENZA A PCR Negative     INFLUENZA B PCR Negative     RSV PCR Negative    Narrative:      FOR PEDIATRIC PATIENTS - copy/paste COVID Guidelines URL to browser: [email or web address]    SARS-CoV-2 assay is a Nucleic Acid Amplification assay intended for the  qualitative detection of nucleic acid from SARS-CoV-2 in nasopharyngeal  swabs.  Results are for the presumptive identification of SARS-CoV-2 RNA.    Positive results are indicative of infection with SARS-CoV-2, the virus  causing COVID-19, but do not rule out bacterial infection or co-infection  with other viruses. Laboratories within the [facility] and its  territories are required to report all positive results to the appropriate  public health authorities. Negative results do not preclude SARS-CoV-2  infection and should not be used as the sole basis for treatment or other  patient management decisions. Negative results must be combined with  clinical observations, patient history, and epidemiological information. This test has not been FDA cleared or approved. This test has been authorized by FDA under an Emergency Use Authorization  (EUA). This test is only authorized for the duration of time the  declaration that circumstances exist justifying the authorization of the  emergency use of an in vitro diagnostic tests for detection of SARS-CoV-2  virus and/or diagnosis of COVID-19 infection under section 564(b)(1) of  the Act, 21 U. S.C. 827ZTM-0(J)(9), unless the authorization is terminated  or revoked sooner. The test has been validated but independent review by FDA  and CLIA is pending. Test performed using [employer] Gene[employer]pert: This RT-PCR assay targets N2,  a region unique to SARS-CoV-2. A conserved region in the E-gene was chosen  for pan-Sarbecovirus detection which includes SARS-CoV-2. According to CMS-2020-01-R, this platform meets the definition of high-throughput technology.     RBC Morphology Reflex Test [[id number]] Collected: 08/14/23 0017    Lab Status: Final result Specimen: Blood from Arm, Right Updated: 08/14/23 0101    Manual Differential(PHLEBS Do Not Order) [[id number]]  (Abnormal) Collected: 08/14/23 0017    Lab Status: Final result Specimen: Blood from Arm, Right Updated: 08/14/23 0100     Segmented % 94 %      Bands % 2 %      Lymphocytes % 1 %      Monocytes % 1 %      Eosinophils, % 0 %      Basophils % 0 % Metamyelocytes% 2 %      Absolute Neutrophils 12.48 Thousand/uL      Lymphocytes Absolute 0.13 Thousand/uL      Monocytes Absolute 0.13 Thousand/uL      Eosinophils Absolute 0.00 Thousand/uL      Basophils Absolute 0.00 Thousand/uL      Total Counted --     RBC Morphology Present     Platelet Estimate Unable to Estimate due to clumped platelets     Anisocytosis Present     Poikilocytes Present    Procalcitonin [[id number]]  (Abnormal) Collected: 08/14/23 0017    Lab Status: Final result Specimen: Blood from Arm, Right Updated: 08/14/23 0059     Procalcitonin 5.75 ng/ml     Legionella antigen, urine [[id number]]     Lab Status: No result Specimen: Urine     Lactic acid [[id number]]  (Normal) Collected: 08/14/23 0017    Lab Status: Final result Specimen: Blood from Arm, Right Updated: 08/14/23 0049     LACTIC ACID 1.6 mmol/L     Narrative:      Result may be elevated if tourniquet was used during collection.     Comprehensive metabolic panel [[id number]]  (Abnormal) Collected: 08/14/23 0017    Lab Status: Final result Specimen: Blood from Arm, Right Updated: 08/14/23 0049     Sodium 124 mmol/L      Potassium 4.3 mmol/L      Chloride 91 mmol/L      CO2 20 mmol/L      ANION GAP 13 mmol/L      BUN 27 mg/dL      Creatinine 0.79 mg/dL      Glucose 378 mg/dL      Calcium 8.2 mg/dL      Corrected Calcium 9.2 mg/dL      AST 51 U/L      ALT 31 U/L      Alkaline Phosphatase 78 U/L      Total Protein 6.2 g/dL      Albumin 2.7 g/dL      Total Bilirubin 0.47 mg/dL      eGFR 88 ml/min/1.73sq m     Narrative:      [place] guidelines for Chronic Kidney Disease (CKD):   •  Stage 1 with normal or high GFR (GFR > 90 mL/min/1.73 square meters)  •  Stage 2 Mild CKD (GFR = 60-89 mL/min/1.73 square meters)  •  Stage 3A Moderate CKD (GFR = 45-59 mL/min/1.73 square meters)  •  Stage 3B Moderate CKD (GFR = 30-44 mL/min/1.73 square meters)  •  Stage 4 Severe CKD (GFR = 15-29 mL/min/1.73 square meters)  •  Stage 5 End Stage CKD (GFR <15 mL/min/1.73 square meters)  Note: GFR calculation is accurate only with a steady state creatinine    Beta Hydroxybutyrate [[id number]]  (Abnormal) Collected: 08/14/23 0017    Lab Status: Final result Specimen: Blood from Arm, Right Updated: 08/14/23 0031     BETA-HYDROXYBUTYRATE 2.8 mmol/L     Blood culture #1 [[id number]] Collected: 08/14/23 0017    Lab Status:  In process Specimen: Blood from Arm, Right Updated: 08/14/23 0025    UA w Reflex to Microscopic w Reflex to Culture [[id number]]     Lab Status: No result Specimen: Urine     Fingerstick Glucose (POCT) [[id number]]  (Abnormal) Collected: 08/13/23 2359    Lab Status: Final result Updated: 08/14/23 0001     POC Glucose 361 mg/dl                  XR chest 1 view portable   ED Interpretation by Phil Lux MD (22/59 0044)   Right-sided infiltrate                 Procedures  ECG 12 Lead Documentation Only    Date/Time: 8/14/2023 1:46 AM    Performed by: Phil Lux MD  Authorized by: Phil Lux MD    ECG reviewed by me, the ED Provider: yes    Patient location:  ED  Interpretation:     Interpretation: abnormal    Rate:     ECG rate:  101    ECG rate assessment: tachycardic    Rhythm:     Rhythm: sinus tachycardia    Ectopy:     Ectopy: none    QRS:     QRS axis:  Normal    QRS intervals:  Normal  Conduction:     Conduction: normal    ST segments:     ST segments:  Normal  T waves:     T waves: normal    CriticalCare Time    Date/Time: 8/14/2023 1:46 AM    Performed by: Phil Lux MD  Authorized by: Phil Lux MD    Critical care provider statement:     Critical care time (minutes):  60    Critical care time was exclusive of:  Separately billable procedures and treating other patients and teaching time    Critical care was necessary to treat or prevent imminent or life-threatening deterioration of the following conditions:  Respiratory failure    Critical care was time spent personally by me on the following activities:  Examination of patient, evaluation of patient's response to treatment, ordering and review of laboratory studies and ordering and review of radiographic studies    I assumed direction of critical care for this patient from another provider in my specialty: no    Comments:      Weakness, sepsis, hypoxia requiring IV antibiotics, supplemental oxygen, admission to the hospital             ED Course  ED Course as of 08/14/23 0149   Mon Aug 14, 2023   0056 Corrected sodium 128                                             Medical Decision Making  Weakness, fever, SIRS criteria plus source, sepsis, IV antibiotics, supplemental oxygenation. Hemodynamically stable for the floor. Leukocytosis. Normal lactic acid. Labs concerning for hyponatremia to 124 however corrects to 128. Gentle fluid hydration given to patient. VBG with no acidosis. No anion gap on BMP to suggest DKA. Fluid hydration, IV antibiotics, hospitalist admission. Hyperglycemia: acute illness or injury  Hyponatremia: acute illness or injury  Pneumonia: acute illness or injury  Sepsis [facility]): acute illness or injury  Amount and/or Complexity of Data Reviewed  Labs: ordered. Radiology: ordered and independent interpretation performed. Details: Ordered, independently interpreted chest x-ray, right lower lobe pneumonia  ECG/medicine tests: ordered and independent interpretation performed. Risk  OTC drugs. Decision regarding hospitalization.           Disposition  Final diagnoses:   Hyponatremia   Sepsis ([address])   Pneumonia   Hyperglycemia     Time reflects when diagnosis was documented in both MDM as applicable and the Disposition within this note     Time User Action Codes Description Comment    8/14/2023  1:44 AM Neville Emile Add [E87.1] Hyponatremia     8/14/2023  1:44 AM Neville Emile Add [A41.9] Sepsis ([address])     8/14/2023  1:44 AM Gilsum Emile Add [J18.9] Pneumonia     8/14/2023  1:44 AM Neville Emile Add [R73.9] Hyperglycemia       ED Disposition     ED Disposition   Admit    Condition   Stable    Date/Time   Mon Aug 14, 2023  1:44 AM    Comment   Case was discussed with BRANDON and the patient's admission status was agreed to be Admission Status: inpatient status to the service of Dr. Marylen Gauss . Follow-up Information    None         Patient's Medications   Discharge Prescriptions    No medications on file       No discharge procedures on file.     PDMP Review     None          ED Provider  Electronically Signed by           Harman Shaikh MD  08/14/23 9537

## 2023-08-14 NOTE — ASSESSMENT & PLAN NOTE
· CC: Shortness of breath with weakness and lethargy  · Noted on CXR - right lower lobe consolidation, likely CAP  · Met sepsis criteria on presentation  · No recent hospitalizations, no sick contacts    · Ceftriaxone and azithromycin  · Urine Legionella  · Collect sputum culture  · Rest of plan as noted above

## 2023-08-14 NOTE — ASSESSMENT & PLAN NOTE
· 2/2 PNA of RLL  · Chronic smoker, half a pack per day  · Satting 80s on room air, found on 4 L satting in mid 90s.   · VBG without hypercapnia  · Baseline is room air    · Wean O2 as tolerated  · Management of PNA as noted  · Incentive spirometry  · Encourage smoking cessation, nicotine replacement

## 2023-08-14 NOTE — ASSESSMENT & PLAN NOTE
· Home regimen includes levothyroxine 112 mcg twice a day as per chart review  · Patient unsure of home medications as roommate helps with meds. Attempted call to roommate but could not reach.     · Continue home regimen at discharge

## 2023-08-14 NOTE — PLAN OF CARE
Problem: Potential for Falls  Goal: Patient will remain free of falls  Description: INTERVENTIONS:  - Educate patient/family on patient safety including physical limitations  - Instruct patient to call for assistance with activity   - Consult OT/PT to assist with strengthening/mobility   - Keep Call bell within reach  - Keep bed low and locked with side rails adjusted as appropriate  - Keep care items and personal belongings within reach  - Initiate and maintain comfort rounds  - Make Fall Risk Sign visible to staff  - Offer Toileting every 2 Hours, in advance of need  - Initiate/Maintain Bed alarm  - Obtain necessary fall risk management equipment: Alarms  - Apply yellow socks and bracelet for high fall risk patients  - Consider moving patient to room near nurses station  Outcome: Progressing     Problem: MOBILITY - ADULT  Goal: Maintain or return to baseline ADL function  Description: INTERVENTIONS:  -  Assess patient's ability to carry out ADLs; assess patient's baseline for ADL function and identify physical deficits which impact ability to perform ADLs (bathing, care of mouth/teeth, toileting, grooming, dressing, etc.)  - Assess/evaluate cause of self-care deficits   - Assess range of motion  - Assess patient's mobility; develop plan if impaired  - Assess patient's need for assistive devices and provide as appropriate  - Encourage maximum independence but intervene and supervise when necessary  - Involve family in performance of ADLs  - Assess for home care needs following discharge   - Consider OT consult to assist with ADL evaluation and planning for discharge  - Provide patient education as appropriate  Outcome: Progressing     Problem: PAIN - ADULT  Goal: Verbalizes/displays adequate comfort level or baseline comfort level  Description: Interventions:  - Encourage patient to monitor pain and request assistance  - Assess pain using appropriate pain scale  - Administer analgesics based on type and severity of pain and evaluate response  - Implement non-pharmacological measures as appropriate and evaluate response  - Consider cultural and social influences on pain and pain management  - Notify physician/advanced practitioner if interventions unsuccessful or patient reports new pain  Outcome: Progressing     Problem: INFECTION - ADULT  Goal: Absence or prevention of progression during hospitalization  Description: INTERVENTIONS:  - Assess and monitor for signs and symptoms of infection  - Monitor lab/diagnostic results  - Monitor all insertion sites, i.e. indwelling lines, tubes, and drains  - Monitor endotracheal if appropriate and nasal secretions for changes in amount and color  - [place] appropriate cooling/warming therapies per order  - Administer medications as ordered  - Instruct and encourage patient and family to use good hand hygiene technique  - Identify and instruct in appropriate isolation precautions for identified infection/condition  Outcome: Progressing     Problem: DISCHARGE PLANNING  Goal: Discharge to home or other facility with appropriate resources  Description: INTERVENTIONS:  - Identify barriers to discharge w/patient and caregiver  - Arrange for needed discharge resources and transportation as appropriate  - Identify discharge learning needs (meds, wound care, etc.)  - Arrange for interpretive services to assist at discharge as needed  - Refer to Case Management Department for coordinating discharge planning if the patient needs post-hospital services based on physician/advanced practitioner order or complex needs related to functional status, cognitive ability, or social support system  Outcome: Progressing     Problem: Knowledge Deficit  Goal: Patient/family/caregiver demonstrates understanding of disease process, treatment plan, medications, and discharge instructions  Description: Complete learning assessment and assess knowledge base.   Interventions:  - Provide teaching at level of understanding  - Provide teaching via preferred learning methods  Outcome: Progressing

## 2023-08-14 NOTE — ASSESSMENT & PLAN NOTE
· 2/2 PNA of RLL  · Chronic smoker, half a pack per day  · Satting 80s on room air, found on 4 L satting in mid 90s.   · VBG without hypercapnia  · Baseline is room air  · Was weaned to room air    · Wean O2 as tolerated  · Management of PNA as noted  · Incentive spirometry  · Encourage smoking cessation, nicotine replacement

## 2023-08-14 NOTE — H&P
[address] [facility]  H&P  Name: Harrison Mahmood 74 y.o. male I MRN: [id number]  Unit/Bed#: ED-17 I Date of Admission: 8/13/2023   Date of Service: 8/14/2023 I Hospital Day: 0      Assessment/Plan   * Sepsis ([address])  Assessment & Plan  · POA, febrile 100.7, tachycardic 105, WBC 13, most likely source PNA  · CC: Shortness of breath associated with weakness and lethargy. Addressed diarrhea however adds that this is chronic since treatment for his prostate cancer. Denies urinary symptoms. · Procalcitonin elevation at 5.75. Lactic acid within normal limits. 4 Plex negative  · Received 1 L bolus of NS and x1 dose of ceftriaxone in ED    · Follow blood cultures  · Collect sputum cultures  · Check UA for completeness of sepsis work-up  · Continue ceftriaxone and azithromycin  · Urine Legionella, if negative discontinue azithromycin  · A.m. CBC, procalcitonin  · trend fever curves  · Continue IV fluids 100 cc/h    Acute respiratory failure with hypoxia (HCC)  Assessment & Plan  · 2/2 PNA of RLL  · Chronic smoker, half a pack per day  · Satting 80s on room air, found on 4 L satting in mid 90s. · VBG without hypercapnia  · Baseline is room air    · Wean O2 as tolerated  · Management of PNA as noted  · Incentive spirometry  · Encourage smoking cessation, nicotine replacement    PNA (pneumonia)  Assessment & Plan  · CC: Shortness of breath with weakness and lethargy  · Noted on CXR - right lower lobe consolidation, likely CAP  · Met sepsis criteria on presentation  · No recent hospitalizations, no sick contacts    · Ceftriaxone and azithromycin  · Urine Legionella  · Collect sputum culture  · Rest of plan as noted above    Hyponatremia  Assessment & Plan  · Corrected sodium 128, for hyperglycemia of 378  · Likely in the setting of poor p.o. intake since yesterday morning given feeling sick  · Received 1 L bolus of NS in ED    · Serum osmolality, urine osmolality, urine sodium  · Continue IVF for now  · A.m. BMP    Diabetes mellitus with hyperglycemia ([address])  Assessment & Plan  Lab Results   Component Value Date    HGBA1C 8.7 (H) 05/28/2020     Recent Labs     08/13/23  2359   POCGLU 361*   Blood Sugar Average: Last 72 hrs:  (P) 361     · Home regimen: Glipizide, saxagliptin, NovoLog 14 units with meals, and glargine 40 units at bedtime  · Attempted to call patient's roommate will help with medications to confirm but unable to reach  · Hyperglycemia at 378 on presentation, possibly given illness and setting of acute infection +/- use of home meds the last day  · Beta hydroxybutyrate 2.8, no anion gap elevation    · Hold oral hypoglycemics  · Give x1 Humalog 10 units now for hyperglycemia  · Continue home regimen with Humalog 14 units with meals and glargine 40 units at bedtime  · SSI before meals and at bedtime with POCT Glc  · Glycemia protocol  · Diabetic diet  · Check Hb A1c    Hypothyroidism  Assessment & Plan  · Home regimen includes levothyroxine 112 mcg twice a day as per chart review  · Patient unsure of home medications as roommate helps with meds. Attempted call to roommate but could not reach. · We will need to call patient's roommate to confirm home regimen  · Continue levothyroxine 112 mcg daily for now until home regimen is confirmed    Generalized weakness  Assessment & Plan  · Baseline: Uses walker or cane. Occasionally walks without device. · Reports generalized weakness and some lethargy. Likely in the setting of acute infection. · Patient agrees on rehab placement if needed    · PT/OT eval and treat    Personal history of DVT and PE  Assessment & Plan  · Reports he has a history of DVT and PE  · Home regimen includes rivaroxaban 20 mg daily. · Continue home regimen       VTE Pharmacologic Prophylaxis: VTE Score: 3 Moderate Risk (Score 3-4) - Pharmacological DVT Prophylaxis Ordered: rivaroxaban (Xarelto).   Code Status: Level 1 - Full Code   Discussion with family: Attempted to update contact person (roommate/friend) via phone. Unable to contact. Anticipated Length of Stay: Patient will be admitted on an inpatient basis with an anticipated length of stay of greater than 2 midnights secondary to Sepsis 2/2 PNA and ARF w/ Hypoxia. Chief Complaint: shortness of breath    History of Present Illness:  Tricia Matt is a 74 y.o. 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 Patient also reported weakness, lethargy, cough productive of sputum, and diarrhea to which she adds is actually chronic since radiation for prostate cancer. Patient denies any chills, fevers, diaphoresis, chest pain, headache, nausea, vomiting, dysuria and skin wounds. On presentation febrile, tachycardic, and hypoxic while on room air. On exam, found on 4 L of O2 satting at mid 90s, lethargic but arousable, oriented x3, coarse sounds noted at right posterior base, and no other findings. Labs significant for leukocytosis, hyponatremia, hyperglycemia, elevated procalcitonin, and elevated beta hydroxybutyrate without anion gap elevation. CXR showing RLL PNA. Patient will be admitted to academic SLIM service for management of Sepsis 2/2 to PNA and ARF w/ hypoxia. Review of Systems:  Review of Systems   Constitutional: Positive for appetite change and fatigue. Negative for chills, diaphoresis and fever. HENT: Negative for ear pain and sore throat. Eyes: Negative for pain and visual disturbance. Respiratory: Positive for cough (productive) and shortness of breath. Negative for choking and chest tightness. Cardiovascular: Negative for chest pain and palpitations. Gastrointestinal: Positive for diarrhea (chronic). Negative for abdominal pain, nausea and vomiting. Genitourinary: Negative for difficulty urinating, dysuria, frequency and hematuria.    Musculoskeletal: Negative for arthralgias and back pain. Skin: Negative for color change and rash. Neurological: Positive for weakness. Negative for seizures, syncope, light-headedness and headaches. All other systems reviewed and are negative. 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  Authorizing Provider   acetaminophen (TYLENOL) 325 mg tablet Take 325 mg by mouth continuous as needed    Historical Provider, MD   Atropine Sulfate 0.25 MG/5ML SOSY Take 0.25 mg by mouth 3 (three) times a day as needed    Historical Provider, MD   carbidopa-levodopa (SINEMET)  mg per tablet Take 1 tablet by mouth 3 (three) times a day 8/20/21   Desirae Sommers MD   Cholecalciferol (VITAMIN D3) 50 MCG (2000 UT) capsule Take 2,000 Units by mouth    Historical Provider, MD   cholestyramine (QUESTRAN) 4 g packet Take 1 packet (4 g total) by mouth 2 (two) times a day with meals 6/10/20   Adiyta Pearce DO   Cyanocobalamin 1000 MCG SUBL Place 1,000 mcg under the tongue daily 4 times a week 6/9/11   Historical Provider, MD   folic acid (FOLVITE) 1 mg tablet Take 1 mg by mouth 2 (two) times a day 6/9/11   Historical Provider, MD   glipiZIDE (GLUCOTROL XL) 5 mg 24 hr tablet Take 5 mg by mouth    Historical Provider, MD   levothyroxine 112 mcg tablet Take 112 mcg by mouth 2 (two) times a day    Historical Provider, MD   pantoprazole (PROTONIX) 40 mg tablet Take 1 tablet (40 mg total) by mouth daily in the early morning  Patient taking differently: Take 40 mg by mouth as needed  5/31/20   Roberto Rodriguez MD   pyridostigmine (MESTINON) 60 mg tablet Take 60 mg by mouth 4 (four) times a day     Historical Provider, MD   saxagliptin (ONGLYZA) 5 MG tablet Take 5 mg by mouth daily    Historical Provider, MD   sildenafil (VIAGRA) 100 mg tablet Take 100 mg by mouth as needed     Historical Provider, MD   simvastatin (ZOCOR) 20 mg tablet Take 10 mg by mouth daily Ordered as 20mg pt takes half a tab    Historical Provider, MD     I have been unable to obtain / verify an up to date medication list despite all reasonable attempts. Patient unsure and roommate helps with medications. Called roommate and was unable to reach. Allergies: Allergies   Allergen Reactions   • Cyclobenzaprine Other (See Comments)   • Metformin Diarrhea   • Other GI Intolerance   • Oxycodone Itching   • Apixaban Delirium       Social History:  Marital Status: Single   Occupation: Retired  Patient Pre-hospital Living Situation: Home w/ roommate  Patient Pre-hospital Level of Mobility: walks with cane or walker  Patient Pre-hospital Diet Restrictions: None   Substance Use History:   Social History     Substance and Sexual Activity   Alcohol Use Not Currently     Social History     Tobacco Use   Smoking Status Every Day   • Packs/day: 0.50   • Years: 61.00   • Total pack years: 30.50   • Types: Cigarettes   Smokeless Tobacco Never     Social History     Substance and Sexual Activity   Drug Use Not Currently       Family History:  Family History   Family history unknown: Yes       Physical Exam:     Vitals:   Blood Pressure: 104/59 (08/14/23 0330)  Pulse: 83 (08/14/23 0330)  Temperature: 98.3 °F (36.8 °C) (08/14/23 0245)  Temp Source: Oral (08/14/23 0245)  Respirations: 20 (08/14/23 0245)  Height: 5' 6" (167.6 cm) (08/13/23 2352)  Weight - Scale: 63.8 kg (140 lb 10.5 oz) (08/13/23 2352)  SpO2: 95 % (08/14/23 0330)    Physical Exam  Vitals and nursing note reviewed. Constitutional:       General: He is not in acute distress. Appearance: Normal appearance. He is normal weight. He is ill-appearing.  He is not toxic-appearing or diaphoretic. Interventions: Nasal cannula in place. HENT:      Head: Normocephalic and atraumatic. Mouth/Throat:      Mouth: Mucous membranes are dry. Pharynx: Oropharynx is clear. Eyes:      General: No scleral icterus. Conjunctiva/sclera: Conjunctivae normal.   Cardiovascular:      Rate and Rhythm: Normal rate and regular rhythm. Pulses: Normal pulses. Heart sounds: No murmur heard. No gallop. Pulmonary:      Effort: Pulmonary effort is normal. No respiratory distress. Breath sounds: Rhonchi (Posterior R base) present. No wheezing. Abdominal:      General: Bowel sounds are normal. There is no distension. Palpations: Abdomen is soft. There is no mass. Tenderness: There is no abdominal tenderness. Musculoskeletal:         General: No tenderness. Normal range of motion. Cervical back: Normal range of motion and neck supple. No tenderness. Right lower leg: No edema. Left lower leg: No edema. Skin:     General: Skin is warm and dry. Capillary Refill: Capillary refill takes less than 2 seconds. Coloration: Skin is not jaundiced or pale. Neurological:      General: No focal deficit present. Mental Status: He is oriented to person, place, and time and easily aroused. Mental status is at baseline. He is lethargic. Sensory: No sensory deficit. Motor: No weakness.    Psychiatric:         Mood and Affect: Mood normal.         Behavior: Behavior normal.          Additional Data:     Lab Results:  Results from last 7 days   Lab Units 08/14/23  0158 08/14/23  0017   WBC Thousand/uL 12.54*  --    HEMOGLOBIN g/dL 13.4  --    HEMATOCRIT % 39.6  --    PLATELETS Thousands/uL 359  --    BANDS PCT %  --  2   LYMPHO PCT %  --  1*   MONO PCT %  --  1*   EOS PCT %  --  0     Results from last 7 days   Lab Units 08/14/23  0017   SODIUM mmol/L 124*   POTASSIUM mmol/L 4.3   CHLORIDE mmol/L 91*   CO2 mmol/L 20*   BUN mg/dL 27*   CREATININE mg/dL 0.79   ANION GAP mmol/L 13   CALCIUM mg/dL 8.2*   ALBUMIN g/dL 2.7*   TOTAL BILIRUBIN mg/dL 0.47   ALK PHOS U/L 78   ALT U/L 31   AST U/L 51*   GLUCOSE RANDOM mg/dL 378*     Results from last 7 days   Lab Units 08/14/23  0017   INR  1.12     Results from last 7 days   Lab Units 08/13/23  2359   POC GLUCOSE mg/dl 361*         Results from last 7 days   Lab Units 08/14/23  0017   LACTIC ACID mmol/L 1.6   PROCALCITONIN ng/ml 5.75*       Lines/Drains:  Invasive Devices     Peripheral Intravenous Line  Duration           Peripheral IV 08/14/23 Right Antecubital <1 day    Peripheral IV 08/14/23 Right; Lower Forearm <1 day                    Imaging: Reviewed radiology reports from this admission including: chest xray  XR chest 1 view portable   ED Interpretation by Preeti Escoto MD (08/14 5841)   Right-sided infiltrate          EKG and Other Studies Reviewed on Admission:   · EKG: Sinus Tachycardia. . Tobacco and Toxic Substance Assessment and Intervention:     Tobacco use screening performed    Tobacco cessation counseling provided      Therapeutic Lifestyle Change Visit:     One-on-one comprehensive counseling, coaching, and health behavior change visit completed        ** Please Note: This note has been constructed using a voice recognition system.  **

## 2023-08-14 NOTE — ASSESSMENT & PLAN NOTE
Lab Results   Component Value Date    HGBA1C 8.7 (H) 05/28/2020     Recent Labs     08/13/23  2359 08/14/23  0439   POCGLU 361* 361*   Blood Sugar Average: Last 72 hrs:  (P) 361     · Home regimen: Glipizide, saxagliptin, NovoLog 14 units with meals, and glargine 40 units at bedtime  · Attempted to call patient's roommate will help with medications to confirm but unable to reach  · Hyperglycemia at 378 on presentation, possibly given illness and setting of acute infection +/- use of home meds the last day  · Beta hydroxybutyrate 2.8, no anion gap elevation    · Hold oral hypoglycemics  · Give x1 Humalog 10 units now for hyperglycemia  · Continue home regimen with Humalog 14 units with meals and glargine 40 units at bedtime  · SSI before meals and at bedtime with POCT Glc  · Glycemia protocol  · Diabetic diet  · Check Hb A1c  · Continue home regimen at discharge

## 2023-08-15 LAB
2HR DELTA HS TROPONIN: 0 NG/L
ALBUMIN SERPL BCP-MCNC: 2.4 G/DL (ref 3.5–5)
ALP SERPL-CCNC: 62 U/L (ref 34–104)
ALT SERPL W P-5'-P-CCNC: 39 U/L (ref 7–52)
ANION GAP SERPL CALCULATED.3IONS-SCNC: 8 MMOL/L
AST SERPL W P-5'-P-CCNC: 80 U/L (ref 13–39)
ATRIAL RATE: 103 BPM
BASOPHILS # BLD AUTO: 0.04 THOUSANDS/ÂΜL (ref 0–0.1)
BASOPHILS # BLD AUTO: 0.08 THOUSANDS/ÂΜL (ref 0–0.1)
BASOPHILS NFR BLD AUTO: 1 % (ref 0–1)
BASOPHILS NFR BLD AUTO: 1 % (ref 0–1)
BILIRUB SERPL-MCNC: 0.47 MG/DL (ref 0.2–1)
BNP SERPL-MCNC: 52 PG/ML (ref 0–100)
BUN SERPL-MCNC: 21 MG/DL (ref 5–25)
CALCIUM ALBUM COR SERPL-MCNC: 8.6 MG/DL (ref 8.3–10.1)
CALCIUM SERPL-MCNC: 7.3 MG/DL (ref 8.4–10.2)
CARDIAC TROPONIN I PNL SERPL HS: 7 NG/L
CHLORIDE SERPL-SCNC: 98 MMOL/L (ref 96–108)
CO2 SERPL-SCNC: 23 MMOL/L (ref 21–32)
CREAT SERPL-MCNC: 0.71 MG/DL (ref 0.6–1.3)
EOSINOPHIL # BLD AUTO: 0.01 THOUSAND/ÂΜL (ref 0–0.61)
EOSINOPHIL # BLD AUTO: 0.03 THOUSAND/ÂΜL (ref 0–0.61)
EOSINOPHIL NFR BLD AUTO: 0 % (ref 0–6)
EOSINOPHIL NFR BLD AUTO: 0 % (ref 0–6)
ERYTHROCYTE [DISTWIDTH] IN BLOOD BY AUTOMATED COUNT: 13.2 % (ref 11.6–15.1)
ERYTHROCYTE [DISTWIDTH] IN BLOOD BY AUTOMATED COUNT: 13.3 % (ref 11.6–15.1)
GFR SERPL CREATININE-BSD FRML MDRD: 92 ML/MIN/1.73SQ M
GLUCOSE SERPL-MCNC: 140 MG/DL (ref 65–140)
GLUCOSE SERPL-MCNC: 165 MG/DL (ref 65–140)
GLUCOSE SERPL-MCNC: 75 MG/DL (ref 65–140)
GLUCOSE SERPL-MCNC: 91 MG/DL (ref 65–140)
GLUCOSE SERPL-MCNC: 98 MG/DL (ref 65–140)
HCT VFR BLD AUTO: 34.1 % (ref 36.5–49.3)
HCT VFR BLD AUTO: 41.1 % (ref 36.5–49.3)
HGB BLD-MCNC: 11.5 G/DL (ref 12–17)
HGB BLD-MCNC: 14.2 G/DL (ref 12–17)
IMM GRANULOCYTES # BLD AUTO: 0.08 THOUSAND/UL (ref 0–0.2)
IMM GRANULOCYTES # BLD AUTO: 0.09 THOUSAND/UL (ref 0–0.2)
IMM GRANULOCYTES NFR BLD AUTO: 1 % (ref 0–2)
IMM GRANULOCYTES NFR BLD AUTO: 1 % (ref 0–2)
LG PLATELETS BLD QL SMEAR: PRESENT
LYMPHOCYTES # BLD AUTO: 0.51 THOUSANDS/ÂΜL (ref 0.6–4.47)
LYMPHOCYTES # BLD AUTO: 0.52 THOUSANDS/ÂΜL (ref 0.6–4.47)
LYMPHOCYTES NFR BLD AUTO: 5 % (ref 14–44)
LYMPHOCYTES NFR BLD AUTO: 6 % (ref 14–44)
MCH RBC QN AUTO: 32 PG (ref 26.8–34.3)
MCH RBC QN AUTO: 32.4 PG (ref 26.8–34.3)
MCHC RBC AUTO-ENTMCNC: 33.7 G/DL (ref 31.4–37.4)
MCHC RBC AUTO-ENTMCNC: 34.8 G/DL (ref 31.4–37.4)
MCV RBC AUTO: 93 FL (ref 82–98)
MCV RBC AUTO: 95 FL (ref 82–98)
MONOCYTES # BLD AUTO: 0.18 THOUSAND/ÂΜL (ref 0.17–1.22)
MONOCYTES # BLD AUTO: 0.2 THOUSAND/ÂΜL (ref 0.17–1.22)
MONOCYTES NFR BLD AUTO: 2 % (ref 4–12)
MONOCYTES NFR BLD AUTO: 3 % (ref 4–12)
NEUTROPHILS # BLD AUTO: 7.2 THOUSANDS/ÂΜL (ref 1.85–7.62)
NEUTROPHILS # BLD AUTO: 9.37 THOUSANDS/ÂΜL (ref 1.85–7.62)
NEUTS SEG NFR BLD AUTO: 89 % (ref 43–75)
NEUTS SEG NFR BLD AUTO: 91 % (ref 43–75)
NRBC BLD AUTO-RTO: 0 /100 WBCS
NRBC BLD AUTO-RTO: 0 /100 WBCS
P AXIS: 76 DEGREES
PLATELET # BLD AUTO: 222 THOUSANDS/UL (ref 149–390)
PLATELET # BLD AUTO: 388 THOUSANDS/UL (ref 149–390)
PLATELET BLD QL SMEAR: ADEQUATE
PMV BLD AUTO: 9.6 FL (ref 8.9–12.7)
PMV BLD AUTO: 9.8 FL (ref 8.9–12.7)
POTASSIUM SERPL-SCNC: 3.9 MMOL/L (ref 3.5–5.3)
PR INTERVAL: 174 MS
PROT SERPL-MCNC: 5.5 G/DL (ref 6.4–8.4)
QRS AXIS: 75 DEGREES
QRSD INTERVAL: 76 MS
QT INTERVAL: 338 MS
QTC INTERVAL: 442 MS
RBC # BLD AUTO: 3.59 MILLION/UL (ref 3.88–5.62)
RBC # BLD AUTO: 4.42 MILLION/UL (ref 3.88–5.62)
RBC MORPH BLD: NORMAL
SODIUM SERPL-SCNC: 129 MMOL/L (ref 135–147)
T WAVE AXIS: 78 DEGREES
VENTRICULAR RATE: 103 BPM
WBC # BLD AUTO: 10.24 THOUSAND/UL (ref 4.31–10.16)
WBC # BLD AUTO: 8.07 THOUSAND/UL (ref 4.31–10.16)

## 2023-08-15 PROCEDURE — 82948 REAGENT STRIP/BLOOD GLUCOSE: CPT

## 2023-08-15 PROCEDURE — 93010 ELECTROCARDIOGRAM REPORT: CPT | Performed by: INTERNAL MEDICINE

## 2023-08-15 PROCEDURE — 99222 1ST HOSP IP/OBS MODERATE 55: CPT | Performed by: FAMILY MEDICINE

## 2023-08-15 PROCEDURE — 80053 COMPREHEN METABOLIC PANEL: CPT

## 2023-08-15 PROCEDURE — 36415 COLL VENOUS BLD VENIPUNCTURE: CPT | Performed by: EMERGENCY MEDICINE

## 2023-08-15 PROCEDURE — 84484 ASSAY OF TROPONIN QUANT: CPT | Performed by: EMERGENCY MEDICINE

## 2023-08-15 PROCEDURE — 85025 COMPLETE CBC W/AUTO DIFF WBC: CPT

## 2023-08-15 PROCEDURE — 96361 HYDRATE IV INFUSION ADD-ON: CPT

## 2023-08-15 PROCEDURE — 96367 TX/PROPH/DG ADDL SEQ IV INF: CPT

## 2023-08-15 RX ORDER — INSULIN GLARGINE 100 [IU]/ML
40 INJECTION, SOLUTION SUBCUTANEOUS
Status: DISCONTINUED | OUTPATIENT
Start: 2023-08-15 | End: 2023-08-15

## 2023-08-15 RX ORDER — CHOLESTYRAMINE LIGHT 4 G/5.7G
4 POWDER, FOR SUSPENSION ORAL 2 TIMES DAILY WITH MEALS
Status: DISCONTINUED | OUTPATIENT
Start: 2023-08-15 | End: 2023-08-16 | Stop reason: HOSPADM

## 2023-08-15 RX ORDER — INSULIN LISPRO 100 [IU]/ML
1-5 INJECTION, SOLUTION INTRAVENOUS; SUBCUTANEOUS
Status: DISCONTINUED | OUTPATIENT
Start: 2023-08-15 | End: 2023-08-15

## 2023-08-15 RX ORDER — INSULIN LISPRO 100 [IU]/ML
1-5 INJECTION, SOLUTION INTRAVENOUS; SUBCUTANEOUS
Status: DISCONTINUED | OUTPATIENT
Start: 2023-08-15 | End: 2023-08-16 | Stop reason: HOSPADM

## 2023-08-15 RX ORDER — SODIUM CHLORIDE 9 MG/ML
75 INJECTION, SOLUTION INTRAVENOUS CONTINUOUS
Status: DISPENSED | OUTPATIENT
Start: 2023-08-15 | End: 2023-08-15

## 2023-08-15 RX ORDER — FOLIC ACID 1 MG/1
1 TABLET ORAL 2 TIMES DAILY
Status: DISCONTINUED | OUTPATIENT
Start: 2023-08-15 | End: 2023-08-16 | Stop reason: HOSPADM

## 2023-08-15 RX ORDER — INSULIN LISPRO 100 [IU]/ML
14 INJECTION, SOLUTION INTRAVENOUS; SUBCUTANEOUS
Status: DISCONTINUED | OUTPATIENT
Start: 2023-08-15 | End: 2023-08-16 | Stop reason: HOSPADM

## 2023-08-15 RX ORDER — QUETIAPINE FUMARATE 25 MG/1
25 TABLET, FILM COATED ORAL
Status: DISCONTINUED | OUTPATIENT
Start: 2023-08-15 | End: 2023-08-16 | Stop reason: HOSPADM

## 2023-08-15 RX ORDER — PRAVASTATIN SODIUM 40 MG
40 TABLET ORAL
Status: DISCONTINUED | OUTPATIENT
Start: 2023-08-15 | End: 2023-08-16 | Stop reason: HOSPADM

## 2023-08-15 RX ORDER — LEVOTHYROXINE SODIUM 112 UG/1
112 TABLET ORAL
Status: DISCONTINUED | OUTPATIENT
Start: 2023-08-15 | End: 2023-08-16 | Stop reason: HOSPADM

## 2023-08-15 RX ORDER — ALBUMIN (HUMAN) 12.5 G/50ML
12.5 SOLUTION INTRAVENOUS 2 TIMES DAILY
Status: COMPLETED | OUTPATIENT
Start: 2023-08-15 | End: 2023-08-16

## 2023-08-15 RX ORDER — ACETAMINOPHEN 325 MG/1
325 TABLET ORAL EVERY 4 HOURS PRN
Status: DISCONTINUED | OUTPATIENT
Start: 2023-08-15 | End: 2023-08-16 | Stop reason: HOSPADM

## 2023-08-15 RX ORDER — MELATONIN
2000 DAILY
Status: DISCONTINUED | OUTPATIENT
Start: 2023-08-15 | End: 2023-08-16 | Stop reason: HOSPADM

## 2023-08-15 RX ORDER — ACETAMINOPHEN 325 MG/1
325 TABLET ORAL CONTINUOUS PRN
Status: DISCONTINUED | OUTPATIENT
Start: 2023-08-15 | End: 2023-08-15

## 2023-08-15 RX ORDER — NICOTINE 21 MG/24HR
14 PATCH, TRANSDERMAL 24 HOURS TRANSDERMAL DAILY
Status: DISCONTINUED | OUTPATIENT
Start: 2023-08-15 | End: 2023-08-16 | Stop reason: HOSPADM

## 2023-08-15 RX ORDER — SIMETHICONE 80 MG
80 TABLET,CHEWABLE ORAL EVERY 6 HOURS PRN
Status: DISCONTINUED | OUTPATIENT
Start: 2023-08-15 | End: 2023-08-16 | Stop reason: HOSPADM

## 2023-08-15 RX ORDER — INSULIN GLARGINE 100 [IU]/ML
20 INJECTION, SOLUTION SUBCUTANEOUS
Status: DISCONTINUED | OUTPATIENT
Start: 2023-08-15 | End: 2023-08-16 | Stop reason: HOSPADM

## 2023-08-15 RX ORDER — PYRIDOSTIGMINE BROMIDE 60 MG/1
60 TABLET ORAL 4 TIMES DAILY
Status: DISCONTINUED | OUTPATIENT
Start: 2023-08-15 | End: 2023-08-16 | Stop reason: HOSPADM

## 2023-08-15 RX ORDER — PANTOPRAZOLE SODIUM 40 MG/1
40 TABLET, DELAYED RELEASE ORAL
Status: DISCONTINUED | OUTPATIENT
Start: 2023-08-15 | End: 2023-08-16 | Stop reason: HOSPADM

## 2023-08-15 RX ADMIN — CARBIDOPA AND LEVODOPA 1 TABLET: 25; 100 TABLET ORAL at 16:34

## 2023-08-15 RX ADMIN — PYRIDOSTIGMINE BROMIDE 60 MG: 60 TABLET ORAL at 09:15

## 2023-08-15 RX ADMIN — CARBIDOPA AND LEVODOPA 1 TABLET: 25; 100 TABLET ORAL at 22:23

## 2023-08-15 RX ADMIN — QUETIAPINE FUMARATE 25 MG: 25 TABLET ORAL at 03:56

## 2023-08-15 RX ADMIN — CARBIDOPA AND LEVODOPA 1 TABLET: 25; 100 TABLET ORAL at 09:15

## 2023-08-15 RX ADMIN — INSULIN LISPRO 14 UNITS: 100 INJECTION, SOLUTION INTRAVENOUS; SUBCUTANEOUS at 17:14

## 2023-08-15 RX ADMIN — LEVOTHYROXINE SODIUM 112 MCG: 112 TABLET ORAL at 09:14

## 2023-08-15 RX ADMIN — PYRIDOSTIGMINE BROMIDE 60 MG: 60 TABLET ORAL at 13:26

## 2023-08-15 RX ADMIN — QUETIAPINE FUMARATE 25 MG: 25 TABLET ORAL at 22:23

## 2023-08-15 RX ADMIN — SIMETHICONE 80 MG: 80 TABLET, CHEWABLE ORAL at 09:14

## 2023-08-15 RX ADMIN — PYRIDOSTIGMINE BROMIDE 60 MG: 60 TABLET ORAL at 17:13

## 2023-08-15 RX ADMIN — CEFTRIAXONE SODIUM 1000 MG: 10 INJECTION, POWDER, FOR SOLUTION INTRAVENOUS at 23:04

## 2023-08-15 RX ADMIN — AZITHROMYCIN MONOHYDRATE 500 MG: 500 INJECTION, POWDER, LYOPHILIZED, FOR SOLUTION INTRAVENOUS at 03:55

## 2023-08-15 RX ADMIN — SODIUM CHLORIDE 75 ML/HR: 0.9 INJECTION, SOLUTION INTRAVENOUS at 03:56

## 2023-08-15 RX ADMIN — RIVAROXABAN 20 MG: 20 TABLET, FILM COATED ORAL at 09:14

## 2023-08-15 RX ADMIN — FOLIC ACID 1 MG: 1 TABLET ORAL at 09:14

## 2023-08-15 RX ADMIN — CHOLESTYRAMINE 4 G: 4 POWDER, FOR SUSPENSION ORAL at 16:34

## 2023-08-15 RX ADMIN — PANTOPRAZOLE SODIUM 40 MG: 40 TABLET, DELAYED RELEASE ORAL at 09:14

## 2023-08-15 RX ADMIN — Medication 2000 UNITS: at 09:14

## 2023-08-15 RX ADMIN — INSULIN LISPRO 1 UNITS: 100 INJECTION, SOLUTION INTRAVENOUS; SUBCUTANEOUS at 17:14

## 2023-08-15 RX ADMIN — VANCOMYCIN HYDROCHLORIDE 1500 MG: 5 INJECTION, POWDER, LYOPHILIZED, FOR SOLUTION INTRAVENOUS at 01:05

## 2023-08-15 RX ADMIN — CHOLESTYRAMINE 4 G: 4 POWDER, FOR SUSPENSION ORAL at 09:14

## 2023-08-15 RX ADMIN — PYRIDOSTIGMINE BROMIDE 60 MG: 60 TABLET ORAL at 22:23

## 2023-08-15 RX ADMIN — NICOTINE 14 MG: 14 PATCH, EXTENDED RELEASE TRANSDERMAL at 11:55

## 2023-08-15 RX ADMIN — ALBUMIN HUMAN 12.5 G: 0.25 SOLUTION INTRAVENOUS at 22:24

## 2023-08-15 RX ADMIN — FOLIC ACID 1 MG: 1 TABLET ORAL at 17:13

## 2023-08-15 RX ADMIN — PRAVASTATIN SODIUM 40 MG: 40 TABLET ORAL at 16:34

## 2023-08-15 NOTE — ASSESSMENT & PLAN NOTE
• Baseline: Uses walker or cane. Occasionally walks without device. • Reports generalized weakness and some lethargy. Likely in the setting of acute infection.   • Patient agreed on rehab placement yesterday if needed     • PT/OT eval and treat

## 2023-08-15 NOTE — ASSESSMENT & PLAN NOTE
• Corrected sodium 128, for hyperglycemia of 378  • Likely in the setting of poor p.o. intake since yesterday morning given feeling sick  • Received 1 L bolus of NS in ED     • Serum osmolality, urine osmolality, urine sodium  • Continue IVF for now  • A.m. BMP

## 2023-08-15 NOTE — ASSESSMENT & PLAN NOTE
• Reports he has a history of DVT and PE  • Home regimen includes rivaroxaban 20 mg daily.      • Continue home regimen

## 2023-08-15 NOTE — ASSESSMENT & PLAN NOTE
Lab Results   Component Value Date    HGBA1C 12.8 (H) 08/14/2023       Recent Labs     08/13/23  2359 08/14/23  0439 08/14/23  2234   POCGLU 361* 361* 97       Blood Sugar Average: Last 72 hrs:  (P) 97       • Home regimen: Glipizide, saxagliptin, NovoLog 14 units with meals, and glargine 40 units at bedtime  • Attempted to call patient's roommate will help with medications to confirm but unable to reach  • Hyperglycemia at 378 on presentation, possibly given illness and setting of acute infection +/- use of home meds the last day  • Beta hydroxybutyrate 2.8, no anion gap elevation     • Hold oral hypoglycemics  • Continue home regimen with Humalog 14 units with meals and glargine 40 units at bedtime  • SSI before meals and at bedtime with POCT Glc  • Glycemia protocol  • Diabetic diet  • Check Hb A1c

## 2023-08-15 NOTE — ED NOTES
Patient transported to [place], receiving RN at bedside.      Cassandra Ambrose  08/15/23 1057

## 2023-08-15 NOTE — ASSESSMENT & PLAN NOTE
CC: Shortness of breath with weakness and lethargy  • Noted on CXR - right lower lobe consolidation, likely CAP  • No recent hospitalizations, no sick contacts  • S/p 2 days Ceftriaxone and azithromycin  • Urine Legionella unable to be obtained  • Rest of plan as noted above    Plan:  · Pt medically stable for DC on oral abx (Vantin)  · PT recommending STR however pt is refusing at this time; pt to be discharged with home PT

## 2023-08-15 NOTE — H&P
[id number] [facility]  H&P  Name: Jan Lew 74 y.o. male I MRN: [id number]  Unit/Bed#: ED-17 I Date of Admission: 8/14/2023   Date of Service: 8/15/2023 I Hospital Day: 0      Assessment/Plan   Acute respiratory failure with hypoxia (HCC)  Assessment & Plan  • 2/2 PNA of RLL  • Chronic smoker, half a pack per day  • Satting 80s on room air, found on 2 L satting in mid 90s.   • Baseline is room air     • Follow blood cultures  • Collect sputum cultures  • Continue ceftriaxone and azithromycin  • Urine Legionella, if negative discontinue azithromycin  • A.m. CBC, procalcitonin  • trend fever curves  • Continue IV fluids 75 cc/h for 10 hours  • Wean O2 as tolerated  • Management of PNA as noted  • Incentive spirometry  • Encourage smoking cessation    PNA (pneumonia)  Assessment & Plan  • CC: Shortness of breath with weakness and lethargy  • Noted on CXR - right lower lobe consolidation, likely CAP  • No recent hospitalizations, no sick contacts     • Ceftriaxone and azithromycin  • Urine Legionella  • Collect sputum culture  • Rest of plan as noted above       Hyponatremia  Assessment & Plan  • Sodium improved to 128 from 124 yesterday  • Likely in the setting of poor p.o. intake since 2 days feeling sick  • Received 1 L bolus of NS in ED        • Continue IVF for now  • A.m. BMP    Diabetes mellitus with hyperglycemia [facility])  Assessment & Plan  Lab Results   Component Value Date    HGBA1C 12.8 (H) 08/14/2023       Recent Labs     08/13/23  2359 08/14/23  0439 08/14/23  2234   POCGLU 361* 361* 97       Blood Sugar Average: Last 72 hrs:  (P) 97       • Home regimen: Glipizide, saxagliptin, NovoLog 14 units with meals, and glargine 40 units at bedtime     • Hold oral hypoglycemics  • Continue home regimen with Humalog 14 units with meals and glargine 40 units at bedtime  • SSI before meals and at bedtime with POCT Glc  • Glycemia protocol  • Diabetic diet  • Hemoglobin A1c checked yesterday, elevated at 12.8. Hypothyroidism  Assessment & Plan  • Home regimen includes levothyroxine 112 mcg twice a day as per chart review  • Patient unsure of home medications as roommate helps with meds. Attempted call to roommate but could not reach.     • We will need to call patient's roommate to confirm home regimen  • Continue levothyroxine 112 mcg daily for now until home regimen is confirmed    Generalized weakness  Assessment & Plan  • Baseline: Uses walker or cane. Occasionally walks without device. • Reports generalized weakness and some lethargy. Likely in the setting of acute infection. • Patient agreed on rehab placement yesterday if needed     • PT/OT eval and treat    Personal history of DVT and PE  Assessment & Plan  • Reports he has a history of DVT and PE  • Home regimen includes rivaroxaban 20 mg daily.      • Continue home regimen         VTE Pharmacologic Prophylaxis: VTE Score: 8 High Risk (Score >/= 5) - Pharmacological DVT Prophylaxis Ordered: rivaroxaban (Xarelto). Sequential Compression Devices Ordered. Code Status: Level 1 - Full Code Level 1, need to reassess in morning if patient's mentation improves  Discussion with family: Attempted to update  (roommate) via phone. Unable to contact. Anticipated Length of Stay: Patient will be admitted on an inpatient basis with an anticipated length of stay of greater than 2 midnights secondary to pneumonia and hyponatremia. Chief Complaint: Shortness of breath    History of Present Illness:  Calderon Betancur is a 74 y.o. 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 Patient came to the hospital 1 day ago, was admitted around 1 AM and left AMA around 6 AM.  He received 1 dose of cefepime in the ED, and was discharged on cefdinir and azithromycin.   Patient is back in the hospital today with altered mental status and continued shortness of breath. Yesterday, patient also reported weakness, lethargy, cough productive of sputum, and diarrhea to which he adds is actually chronic since radiation for prostate cancer. Today, patient states that he is feeling fine and does not know why he is in the hospital.  He repeatedly states that he wants to go home. On presentation tachycardic and hypoxic while on room air. On exam, found on 2 L of O2 satting at mid 90s, lethargic but arousable, oriented x3, coarse sounds noted at right posterior base, and no other findings. Labs significant for leukocytosis, hyponatremia, hyperglycemia, elevated procalcitonin. CXR showing RLL PNA.      Patient will be admitted to academic SLIM service for management of Sepsis 2/2 to PNA and ARF w/ hypoxia. 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  Authorizing Provider   acetaminophen (TYLENOL) 325 mg tablet Take 325 mg by mouth continuous as needed    Historical Provider, MD   Atropine Sulfate 0.25 MG/5ML SOSY Take 0.25 mg by mouth 3 (three) times a day as needed    Historical Provider, MD   azithromycin (ZITHROMAX) 500 MG tablet Take 1 tablet (500 mg total) by mouth every 24 hours for 3 days Do not start before August 15, 2023. 8/15/23 8/18/23  Marybeth Jordan MD   carbidopa-levodopa (SINEMET)  mg per tablet Take 1 tablet by mouth 3 (three) times a day 8/20/21   Faviola Huber Maris Beltran MD   cefdinir (OMNICEF) 300 mg capsule Take 1 capsule (300 mg total) by mouth every 12 (twelve) hours for 5 days 8/14/23 8/19/23  Maxi Barnes MD   Cholecalciferol (VITAMIN D3) 50 MCG (2000 UT) capsule Take 2,000 Units by mouth    Historical Provider, MD   cholestyramine (QUESTRAN) 4 g packet Take 1 packet (4 g total) by mouth 2 (two) times a day with meals 6/10/20   Ijeoma Smith DO   Cyanocobalamin 1000 MCG SUBL Place 1,000 mcg under the tongue daily 4 times a week 6/9/11   Historical Provider, MD   folic acid (FOLVITE) 1 mg tablet Take 1 mg by mouth 2 (two) times a day 6/9/11   Historical Provider, MD   glipiZIDE (GLUCOTROL XL) 5 mg 24 hr tablet Take 5 mg by mouth    Historical Provider, MD   Insulin Aspart (NovoLOG) 100 units/mL injection Inject 14 Units under the skin 3 (three) times a day with meals    Historical Provider, MD   insulin glargine (LANTUS) 100 units/mL subcutaneous injection Inject 40 Units under the skin daily at bedtime    Historical Provider, MD   levothyroxine 112 mcg tablet Take 112 mcg by mouth 2 (two) times a day    Historical Provider, MD   pantoprazole (PROTONIX) 40 mg tablet Take 1 tablet (40 mg total) by mouth daily in the early morning  Patient taking differently: Take 40 mg by mouth as needed  5/31/20   Memory Relic, MD   pyridostigmine (MESTINON) 60 mg tablet Take 60 mg by mouth 4 (four) times a day     Historical Provider, MD   rivaroxaban (XARELTO) 20 mg tablet Take 20 mg by mouth    Historical Provider, MD   saxagliptin (ONGLYZA) 5 MG tablet Take 5 mg by mouth daily    Historical Provider, MD   sildenafil (VIAGRA) 100 mg tablet Take 100 mg by mouth as needed     Historical Provider, MD   simvastatin (ZOCOR) 20 mg tablet Take 10 mg by mouth daily Ordered as 20mg pt takes half a tab    Historical Provider, MD     I have reviewed home medications using recent Epic encounter. Allergies:    Allergies   Allergen Reactions   • Cyclobenzaprine Other (See Comments)   • Metformin Diarrhea   • Other GI Intolerance   • Oxycodone Itching   • Apixaban Delirium       Social History:  Marital Status: Single   Occupation: retired  Patient Pre-hospital Living Situation: Home  Patient Pre-hospital Level of Mobility: walks with cane  Patient Pre-hospital Diet Restrictions: Diabetic diet  Substance Use History:   Social History     Substance and Sexual Activity   Alcohol Use Not Currently     Social History     Tobacco Use   Smoking Status Every Day   • Packs/day: 0.50   • Years: 61.00   • Total pack years: 30.50   • Types: Cigarettes   Smokeless Tobacco Never     Social History     Substance and Sexual Activity   Drug Use Not Currently       Family History:  Family History   Family history unknown: Yes       Physical Exam:     Vitals:   Blood Pressure: 91/55 (08/15/23 0145)  Pulse: 93 (08/15/23 0145)  Temperature: 98.8 °F (37.1 °C) (08/14/23 2152)  Temp Source: Oral (08/14/23 2152)  Respirations: 16 (08/15/23 0145)  Weight - Scale: 63.9 kg (140 lb 14 oz) (08/14/23 2152)  SpO2: 93 % (08/15/23 0145)    Physical Exam  Vitals and nursing note reviewed. Constitutional:       General: He is not in acute distress. Appearance: He is ill-appearing. He is not toxic-appearing or diaphoretic. HENT:      Head: Normocephalic. Comments: Small bruise on temple     Mouth/Throat:      Mouth: Mucous membranes are dry. Eyes:      General: No scleral icterus. Cardiovascular:      Rate and Rhythm: Regular rhythm. Tachycardia present. Heart sounds: No murmur heard. No friction rub. No gallop. Pulmonary:      Effort: No respiratory distress. Breath sounds: Rhonchi present. No wheezing or rales. Comments: On 2 LPM O2 NC  Abdominal:      Tenderness: There is no abdominal tenderness. There is no guarding or rebound. Musculoskeletal:      Cervical back: Normal range of motion. Right lower leg: No edema. Left lower leg: No edema.    Skin:     General: Skin is warm.      Coloration: Skin is not jaundiced. Neurological:      General: No focal deficit present. Mental Status: He is disoriented. Cranial Nerves: No cranial nerve deficit. Motor: Weakness present. Psychiatric:      Comments: Agitated and combative          Additional Data:     Lab Results:  Results from last 7 days   Lab Units 08/14/23 2158 08/14/23 0158 08/14/23  0017   WBC Thousand/uL 10.24*   < >  --    HEMOGLOBIN g/dL 14.2   < >  --    HEMATOCRIT % 41.1   < >  --    PLATELETS Thousands/uL 388   < >  --    BANDS PCT %  --   --  2   NEUTROS PCT % 91*  --   --    LYMPHS PCT % 5*  --   --    LYMPHO PCT %  --   --  1*   MONOS PCT % 2*  --   --    MONO PCT %  --   --  1*   EOS PCT % 0  --  0    < > = values in this interval not displayed.      Results from last 7 days   Lab Units 08/14/23 2158   SODIUM mmol/L 128*   POTASSIUM mmol/L 3.7   CHLORIDE mmol/L 93*   CO2 mmol/L 27   BUN mg/dL 22   CREATININE mg/dL 0.84   ANION GAP mmol/L 8   CALCIUM mg/dL 7.9*   ALBUMIN g/dL 2.6*   TOTAL BILIRUBIN mg/dL 0.46   ALK PHOS U/L 68   ALT U/L 40   AST U/L 78*   GLUCOSE RANDOM mg/dL 91     Results from last 7 days   Lab Units 08/14/23  2226   INR  1.01     Results from last 7 days   Lab Units 08/14/23  2234 08/14/23  0439 08/13/23  2359   POC GLUCOSE mg/dl 97 361* 361*     Results from last 7 days   Lab Units 08/14/23 0158   HEMOGLOBIN A1C % 12.8*     Results from last 7 days   Lab Units 08/14/23 2158 08/14/23  0017   LACTIC ACID mmol/L  --  1.6   PROCALCITONIN ng/ml 5.94* 5.75*       Lines/Drains:  Invasive Devices     Peripheral Intravenous Line  Duration           Peripheral IV 08/14/23 Left;Ventral (anterior) Forearm <1 day                    Imaging: Reviewed radiology reports from this admission including: CT head  CT head without contrast   ED Interpretation by Reji Alex MD (08/15 0011)   FINDINGS:     PARENCHYMA: Periventricular and subcortical hypoattenuating foci consistent with microangiopathic disease.     No acute intracranial hemorrhage or mass effect.     VENTRICLES AND EXTRA-AXIAL SPACES: No hydrocephalus or extra-axial collection.     VISUALIZED ORBITS: Intact globes and orbits.     PARANASAL SINUSES: Opacification of the left sphenoid sinus and sphenoethmoidal junction, likely chronic.     CALVARIUM AND EXTRACRANIAL SOFT TISSUES: No lytic or blastic lesion or fracture.     IMPRESSION:     No acute intracranial abnormality.                 Workstation performed: [id number]      Final Result by Kamala Meza MD (08/15 0009)      No acute intracranial abnormality. Workstation performed: [id number]         XR chest 1 view portable   ED Interpretation by Stephany Henson MD (08/14 2238)   Multifocal pneumonia, worse on R side read by me. EKG and Other Studies Reviewed on Admission:   · EKG: Sinus Tachycardia. . ** Please Note: This note has been constructed using a voice recognition system.  **

## 2023-08-15 NOTE — PLAN OF CARE
Problem: Potential for Falls  Goal: Patient will remain free of falls  Description: INTERVENTIONS:  - Educate patient/family on patient safety including physical limitations  - Instruct patient to call for assistance with activity   - Consult OT/PT to assist with strengthening/mobility   - Keep Call bell within reach  - Keep bed low and locked with side rails adjusted as appropriate  - Keep care items and personal belongings within reach  - Initiate and maintain comfort rounds  - Make Fall Risk Sign visible to staff  - Offer Toileting every 2 Hours, in advance of need  - Initiate/Maintain bed alarm  - Apply yellow socks and bracelet for high fall risk patients  - Consider moving patient to room near nurses station  Outcome: Progressing     Problem: Prexisting or High Potential for Compromised Skin Integrity  Goal: Skin integrity is maintained or improved  Description: INTERVENTIONS:  - Identify patients at risk for skin breakdown  - Assess and monitor skin integrity  - Assess and monitor nutrition and hydration status  - Monitor labs   - Assess for incontinence   - Turn and reposition patient  - Assist with mobility/ambulation  - Relieve pressure over bony prominences  - Avoid friction and shearing  - Provide appropriate hygiene as needed including keeping skin clean and dry  - Evaluate need for skin moisturizer/barrier cream  - Collaborate with interdisciplinary team   - Patient/family teaching  - Consider wound care consult   Outcome: Progressing     Problem: MOBILITY - ADULT  Goal: Maintain or return to baseline ADL function  Description: INTERVENTIONS:  -  Assess patient's ability to carry out ADLs; assess patient's baseline for ADL function and identify physical deficits which impact ability to perform ADLs (bathing, care of mouth/teeth, toileting, grooming, dressing, etc.)  - Assess/evaluate cause of self-care deficits   - Assess range of motion  - Assess patient's mobility; develop plan if impaired  - Assess patient's need for assistive devices and provide as appropriate  - Encourage maximum independence but intervene and supervise when necessary  - Involve family in performance of ADLs  - Assess for home care needs following discharge   - Consider OT consult to assist with ADL evaluation and planning for discharge  - Provide patient education as appropriate  Outcome: Progressing  Goal: Maintains/Returns to pre admission functional level  Description: INTERVENTIONS:  - Perform BMAT or MOVE assessment daily.   - Set and communicate daily mobility goal to care team and patient/family/caregiver. - Collaborate with rehabilitation services on mobility goals if consulted  - Perform Range of Motion 3 times a day. - Reposition patient every 2 hours.   - Dangle patient 3 times a day  - Stand patient 3 times a day  - Ambulate patient 3 times a day  - Out of bed to chair 3 times a day   - Out of bed for meals 3 times a day  - Out of bed for toileting  - Record patient progress and toleration of activity level   Outcome: Progressing

## 2023-08-15 NOTE — ASSESSMENT & PLAN NOTE
Lab Results   Component Value Date    HGBA1C 12.8 (H) 08/14/2023       Recent Labs     08/15/23  2047 08/16/23  0752 08/16/23  1125 08/16/23  1618   POCGLU 75 297* 161* 117       Blood Sugar Average: Last 72 hrs:  (P) [id number]       • Home regimen: Glipizide, saxagliptin, NovoLog 14 units with meals, and glargine 40 units at bedtime  • Diabetic diet    Hemoglobin A1c elevated at 12.8.  Educated sister about pt's poor glycemic control and importance of compliance with diet and medication

## 2023-08-15 NOTE — ED NOTES
Patient refused cath for urine sample, will continue to encourage      William Barajas RN  08/15/23 0001

## 2023-08-15 NOTE — ED PROVIDER NOTES
History  Chief Complaint   Patient presents with   • Lethargy     As per EMS pt was d/c today from hospital. Pt is back now with lethargy (not resolved) and coughing. Patient is a 74year old male with AMS today. Was last seen in this ED on 8/13/23 for hyponatremia and sepsis. (+) coughing. Patient denies chest pain, fever, vomiting, diarrhea. Patient unable to provide complete hx. [facility] website checked on this patient and no Rx found. Patient on xarelto on med list on Epic. History provided by:  Patient and EMS personnel  History limited by:  Mental status change   used: No        Prior to Admission Medications   Prescriptions Last Dose Informant Patient Reported? Taking? Atropine Sulfate 0.25 MG/5ML SOSY  Self Yes No   Sig: Take 0.25 mg by mouth 3 (three) times a day as needed   Cholecalciferol (VITAMIN D3) 50 MCG (2000 UT) capsule  Self Yes No   Sig: Take 2,000 Units by mouth   Cyanocobalamin 1000 MCG SUBL  Self Yes No   Sig: Place 1,000 mcg under the tongue daily 4 times a week   Insulin Aspart (NovoLOG) 100 units/mL injection   Yes No   Sig: Inject 14 Units under the skin 3 (three) times a day with meals   acetaminophen (TYLENOL) 325 mg tablet  Self Yes No   Sig: Take 325 mg by mouth continuous as needed   azithromycin (ZITHROMAX) 500 MG tablet   No No   Sig: Take 1 tablet (500 mg total) by mouth every 24 hours for 3 days Do not start before August 15, 2023.    carbidopa-levodopa (SINEMET)  mg per tablet   No No   Sig: Take 1 tablet by mouth 3 (three) times a day   cefdinir (OMNICEF) 300 mg capsule   No No   Sig: Take 1 capsule (300 mg total) by mouth every 12 (twelve) hours for 5 days   cholestyramine (QUESTRAN) 4 g packet  Self No No   Sig: Take 1 packet (4 g total) by mouth 2 (two) times a day with meals   folic acid (FOLVITE) 1 mg tablet  Self Yes No   Sig: Take 1 mg by mouth 2 (two) times a day   glipiZIDE (GLUCOTROL XL) 5 mg 24 hr tablet  Self Yes No   Sig: Take 5 mg by mouth   insulin glargine (LANTUS) 100 units/mL subcutaneous injection   Yes No   Sig: Inject 40 Units under the skin daily at bedtime   levothyroxine 112 mcg tablet  Self Yes No   Sig: Take 112 mcg by mouth 2 (two) times a day   pantoprazole (PROTONIX) 40 mg tablet  Self No No   Sig: Take 1 tablet (40 mg total) by mouth daily in the early morning   Patient taking differently: Take 40 mg by mouth as needed    pyridostigmine (MESTINON) 60 mg tablet  Self Yes No   Sig: Take 60 mg by mouth 4 (four) times a day    rivaroxaban (XARELTO) 20 mg tablet   Yes No   Sig: Take 20 mg by mouth   saxagliptin (ONGLYZA) 5 MG tablet  Self Yes No   Sig: Take 5 mg by mouth daily   sildenafil (VIAGRA) 100 mg tablet  Self Yes No   Sig: Take 100 mg by mouth as needed    simvastatin (ZOCOR) 20 mg tablet  Self Yes No   Sig: Take 10 mg by mouth daily Ordered as 20mg pt takes half a tab      Facility-Administered Medications: None       Past Medical History:   Diagnosis Date   • Diabetes mellitus ([facility])    • Disease of thyroid gland    • History of blood clots    • 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 E-Cigarette/Vaping   • E-Cigarette Use Never User      E-Cigarette/Vaping Substances   • Nicotine No    • THC No    • CBD No    • Flavoring No    • Other No    • Unknown No      Social History     Tobacco Use   • Smoking status: Every Day     Packs/day: 0.50     Years: 61.00     Total pack years: 30.50     Types: Cigarettes   • Smokeless tobacco: Never   Vaping Use   • Vaping Use: Never used   Substance Use Topics   • Alcohol use: Not Currently   • Drug use: Not Currently       Review of Systems   Unable to perform ROS: Mental status change   Constitutional: Negative for fever.    Respiratory: Positive for cough. Cardiovascular: Negative for chest pain. Gastrointestinal: Negative for nausea and vomiting. Physical Exam  Physical Exam  Vitals and nursing note reviewed. Constitutional:       General: He is in acute distress (moderate). HENT:      Head: Normocephalic and atraumatic. Mouth/Throat:      Mouth: Mucous membranes are dry. Eyes:      General: No scleral icterus. Extraocular Movements: Extraocular movements intact. Pupils: Pupils are equal, round, and reactive to light. Cardiovascular:      Rate and Rhythm: Regular rhythm. Tachycardia present. Heart sounds: Normal heart sounds. No murmur heard. Pulmonary:      Breath sounds: No wheezing or rales. Comments: Breath sounds diminished bilaterally  Abdominal:      General: Bowel sounds are normal. There is no distension. Palpations: Abdomen is soft. Tenderness: There is no abdominal tenderness. Musculoskeletal:         General: No deformity. Cervical back: Normal range of motion and neck supple. Right lower leg: No edema. Left lower leg: No edema. Skin:     General: Skin is warm and dry. Findings: No erythema or rash. Neurological:      Comments: Confused. States it is Feb. Knows place and year. No gross focal deficits.     Psychiatric:         Mood and Affect: Mood normal.         Vital Signs  ED Triage Vitals [08/14/23 2152]   Temperature Pulse Respirations Blood Pressure SpO2   98.8 °F (37.1 °C) 104 16 114/59 93 %      Temp Source Heart Rate Source Patient Position - Orthostatic VS BP Location FiO2 (%)   Oral Monitor Lying Right arm --      Pain Score       No Pain           Vitals:    08/14/23 2345 08/15/23 0115 08/15/23 0130 08/15/23 0145   BP: 95/54 108/55 99/53 91/55   Pulse: 95 92 93 93   Patient Position - Orthostatic VS: Lying Lying Lying Lying         Visual Acuity      ED Medications  Medications   sodium chloride 0.9 % infusion (100 mL/hr Intravenous New Bag 8/14/23 4425) vancomycin (VANCOCIN) 1500 mg in sodium chloride 0.9% 250 mL IVPB (1,500 mg Intravenous New Bag 8/15/23 0105)   sodium chloride 0.9 % bolus 250 mL (0 mL Intravenous Stopped 8/14/23 2351)   cefepime (MAXIPIME) 1,000 mg in dextrose 5 % 50 mL IVPB (0 mg Intravenous Stopped 8/15/23 0021)       Diagnostic Studies  Results Reviewed     Procedure Component Value Units Date/Time    B-Type Natriuretic Peptide(BNP) [[id number]]  (Normal) Collected: 08/14/23 2158    Lab Status: Final result Specimen: Blood from Arm, Left Updated: 08/15/23 0112     BNP 52 pg/mL     HS Troponin I 2hr [[id number]]  (Normal) Collected: 08/15/23 0030    Lab Status: Final result Specimen: Blood from Arm, Left Updated: 08/15/23 0107     hs TnI 2hr 7 ng/L      Delta 2hr hsTnI 0 ng/L     CBC and differential [[id number]]  (Abnormal) Collected: 08/14/23 2158    Lab Status: Final result Specimen: Blood from Arm, Left Updated: 08/15/23 0014     WBC 10.24 Thousand/uL      RBC 4.42 Million/uL      Hemoglobin 14.2 g/dL      Hematocrit 41.1 %      MCV 93 fL      MCH 32.4 pg      MCHC 34.8 g/dL      RDW 13.2 %      MPV 9.6 fL      Platelets 073 Thousands/uL      nRBC 0 /100 WBCs      Neutrophils Relative 91 %      Immat GRANS % 1 %      Lymphocytes Relative 5 %      Monocytes Relative 2 %      Eosinophils Relative 0 %      Basophils Relative 1 %      Neutrophils Absolute 9.37 Thousands/µL      Immature Grans Absolute 0.08 Thousand/uL      Lymphocytes Absolute 0.52 Thousands/µL      Monocytes Absolute 0.18 Thousand/µL      Eosinophils Absolute 0.01 Thousand/µL      Basophils Absolute 0.08 Thousands/µL     Protime-INR [[id number]]  (Normal) Collected: 08/14/23 2226    Lab Status: Final result Specimen: Blood from Arm, Left Updated: 08/14/23 2344     Protime 13.9 seconds      INR 1.01    APTT [[id number]]  (Normal) Collected: 08/14/23 2226    Lab Status: Final result Specimen: Blood from Arm, Left Updated: 08/14/23 2344     PTT 27 seconds     TSH, 3rd generation with Free T4 reflex [[id number]]  (Normal) Collected: 08/14/23 2226    Lab Status: Final result Specimen: Blood from Arm, Left Updated: 08/14/23 2333     TSH 3RD GENERATON 2.917 uIU/mL     Ammonia [[id number]]  (Normal) Collected: 08/14/23 2232    Lab Status: Final result Specimen: Blood from Arm, Left Updated: 08/14/23 2325     Ammonia 30 umol/L     Procalcitonin [[id number]]  (Abnormal) Collected: 08/14/23 2158    Lab Status: Final result Specimen: Blood from Arm, Left Updated: 08/14/23 2318     Procalcitonin 5.94 ng/ml     Blood gas, venous [[id number]]  (Abnormal) Collected: 08/14/23 2232    Lab Status: Final result Specimen: Blood from Arm, Left Updated: 08/14/23 2315     pH, Ben 7.465     pCO2, Ben 33.0 mm Hg      pO2, Ben 59.8 mm Hg      HCO3, Ben 23.2 mmol/L      Base Excess, Ben 0.4 mmol/L      O2 Content, Ben 23.1 ml/dL      O2 HGB, VENOUS 91.9 %     Magnesium [[id number]]  (Normal) Collected: 08/14/23 2158    Lab Status: Final result Specimen: Blood from Arm, Left Updated: 08/14/23 2243     Magnesium 2.0 mg/dL     CK [[id number]]  (Normal) Collected: 08/14/23 2158    Lab Status: Final result Specimen: Blood from Arm, Left Updated: 08/14/23 2243     Total CK 74 U/L     Blood culture #2 [[id number]] Collected: 08/14/23 2232    Lab Status: In process Specimen: Blood from Hand, Left Updated: 08/14/23 2242    Blood culture #1 [[id number]] Collected: 08/14/23 2232    Lab Status:  In process Specimen: Blood from Arm, Left Updated: 08/14/23 2242    Fingerstick Glucose (POCT) [[id number]]  (Normal) Collected: 08/14/23 2234    Lab Status: Final result Updated: 08/14/23 2235     POC Glucose 97 mg/dl     HS Troponin 0hr (reflex protocol) [[id number]]  (Normal) Collected: 08/14/23 2158    Lab Status: Final result Specimen: Blood from Arm, Left Updated: 08/14/23 2232     hs TnI 0hr 7 ng/L     Comprehensive metabolic panel [[id number]]  (Abnormal) Collected: 08/14/23 2158    Lab Status: Final result Specimen: Blood from Arm, Left Updated: 08/14/23 2231     Sodium 128 mmol/L      Potassium 3.7 mmol/L      Chloride 93 mmol/L      CO2 27 mmol/L      ANION GAP 8 mmol/L      BUN 22 mg/dL      Creatinine 0.84 mg/dL      Glucose 91 mg/dL      Calcium 7.9 mg/dL      Corrected Calcium 9.0 mg/dL      AST 78 U/L      ALT 40 U/L      Alkaline Phosphatase 68 U/L      Total Protein 6.0 g/dL      Albumin 2.6 g/dL      Total Bilirubin 0.46 mg/dL      eGFR 86 ml/min/1.73sq m     Narrative:      [place] guidelines for Chronic Kidney Disease (CKD):   •  Stage 1 with normal or high GFR (GFR > 90 mL/min/1.73 square meters)  •  Stage 2 Mild CKD (GFR = 60-89 mL/min/1.73 square meters)  •  Stage 3A Moderate CKD (GFR = 45-59 mL/min/1.73 square meters)  •  Stage 3B Moderate CKD (GFR = 30-44 mL/min/1.73 square meters)  •  Stage 4 Severe CKD (GFR = 15-29 mL/min/1.73 square meters)  •  Stage 5 End Stage CKD (GFR <15 mL/min/1.73 square meters)  Note: GFR calculation is accurate only with a steady state creatinine    UA w Reflex to Microscopic w Reflex to Culture [[id number]]     Lab Status: No result Specimen: Urine, Straight Cath                  CT head without contrast   ED Interpretation by Bonny Le MD (08/15 0011)   FINDINGS:     PARENCHYMA: Periventricular and subcortical hypoattenuating foci consistent with microangiopathic disease.     No acute intracranial hemorrhage or mass effect.     VENTRICLES AND EXTRA-AXIAL SPACES: No hydrocephalus or extra-axial collection.     VISUALIZED ORBITS: Intact globes and orbits.     PARANASAL SINUSES: Opacification of the left sphenoid sinus and sphenoethmoidal junction, likely chronic.     CALVARIUM AND EXTRACRANIAL SOFT TISSUES: No lytic or blastic lesion or fracture.     IMPRESSION:     No acute intracranial abnormality.                 Workstation performed: [id number]      Final Result by Brenda Maravilla MD (08/15 0009)      No acute intracranial abnormality. Workstation performed: [id number]         XR chest 1 view portable   ED Interpretation by Reji Alex MD (08/14 2235)   Multifocal pneumonia, worse on R side read by me. Procedures  ECG 12 Lead Documentation Only    Date/Time: 8/14/2023 9:57 PM    Performed by: Reji Alex MD  Authorized by: Reji Alex MD    Indications / Diagnosis:  AMS  ECG reviewed by me, the ED Provider: yes    Patient location:  ED  Previous ECG:     Previous ECG:  Compared to current    Comparison ECG info:  8/14/23    Similarity:  No change  Rate:     ECG rate:  103    ECG rate assessment: tachycardic    Rhythm:     Rhythm: sinus tachycardia    Ectopy:     Ectopy: none    QRS:     QRS axis:  Normal    QRS intervals:  Normal  Conduction:     Conduction: normal    ST segments:     ST segments:  Normal  T waves:     T waves: normal    Q waves:     Q waves:  V1             ED Course  ED Course as of 08/15/23 0201   Tue Aug 15, 2023   0201 Test results d/w patient.    0201 Procalcitonin(!): 5.94  IV abx ordered. SBIRT 22yo+    Flowsheet Row Most Recent Value   Initial Alcohol Screen: US AUDIT-C     1. How often do you have a drink containing alcohol? 0 Filed at: 08/15/2023 0153   2. How many drinks containing alcohol do you have on a typical day you are drinking? 0 Filed at: 08/15/2023 0153   3a. Male UNDER 65: How often do you have five or more drinks on one occasion? 0 Filed at: 08/15/2023 0153   3b. FEMALE Any Age, or MALE 65+: How often do you have 4 or more drinks on one occassion? 0 Filed at: 08/15/2023 0153   Audit-C Score 0 Filed at: 08/15/2023 0153   IRWIN: How many times in the past year have you. .. Used an illegal drug or used a prescription medication for non-medical reasons?  Never Filed at: 08/15/2023 0153                    Medical Decision Making  DDx including but not limited to: metabolic abnormality, intracranial etiology, cardiac etiology, infectious etiology including UTI, thyroid disease, hyperammonemia, delirium, dementia, pneumonia, overmedication. Amount and/or Complexity of Data Reviewed  Labs: ordered. Decision-making details documented in ED Course. Radiology: ordered and independent interpretation performed. Decision-making details documented in ED Course. ECG/medicine tests: ordered and independent interpretation performed. Decision-making details documented in ED Course. Risk  Prescription drug management. Disposition  Final diagnoses: Altered mental status   Multifocal pneumonia   Hyponatremia   Dehydration     Time reflects when diagnosis was documented in both MDM as applicable and the Disposition within this note     Time User Action Codes Description Comment    8/14/2023 10:36 PM Sivakumar Tanisha Add [R41.82] Altered mental status     8/14/2023 10:36 PM Sivakumar Tanisha Add [J18.9] Pneumonia     8/14/2023 10:36 PM Sivakumar Tanisha Add [J18.9] Multifocal pneumonia     8/14/2023 10:36 PM Sivakumar Tanisha Remove [J18.9] Pneumonia     8/14/2023 10:37 PM Sivakumar Tanisha Add [E87.1] Hyponatremia     8/14/2023 10:37 PM Sivakumar Tanisha Modify [R41.82] Altered mental status     8/14/2023 10:37 PM Sivakumar Tanisha Modify [J18.9] Multifocal pneumonia     8/14/2023 10:37 PM Sivakumar Tanisha Add [E86.0] Dehydration       ED Disposition     ED Disposition   Admit    Condition   Stable    Date/Time   Tue Aug 15, 2023  1:38 AM    Comment   Case was discussed with [facility] resident and the patient's admission status was agreed to be Admission Status: inpatient status to the service of Dr. Yasmine Roy . Follow-up Information    None         Patient's Medications   Discharge Prescriptions    No medications on file       No discharge procedures on file.     PDMP Review       Value Time User    PDMP Reviewed  Yes 8/14/2023 10:03 PM Sivakumar Garay MD          ED Provider  Electronically Signed by           Rg Tsai Jeff Hay MD  08/15/23 0691

## 2023-08-15 NOTE — ASSESSMENT & PLAN NOTE
• 2/2 PNA of RLL  • Chronic smoker, half a pack per day  • Baseline is room air  • Wean O2 as tolerated  • Management of PNA as noted  • Incentive spirometry  • Encourage smoking cessation    Now on RA

## 2023-08-15 NOTE — ASSESSMENT & PLAN NOTE
• Baseline: Uses walker or cane. Occasionally walks without device. • Reports generalized weakness and some lethargy. Likely in the setting of acute infection.   • Patient agrees on rehab placement if needed     • PT/OT eval and treat

## 2023-08-15 NOTE — QUICK NOTE
Examined patient at bedside, he reports he "feels better" and "does not want to be here. He denies any shortness of breath, chest pain, abdominal pain. He does feel weak but reports he wants to go home and "deal with his weakness". Pt refused physical examination, however he appeared to be breathing comfortably with no use of accessory muscles noted.

## 2023-08-15 NOTE — ASSESSMENT & PLAN NOTE
• Home regimen includes levothyroxine 112 mcg twice a day as per chart review  • Patient unsure of home medications as roommate helps with meds.   Attempted call to roommate but could not reach.     • We will need to call patient's roommate to confirm home regimen  • Continue levothyroxine 112 mcg daily for now until home regimen is confirmed

## 2023-08-15 NOTE — DISCHARGE INSTR - AVS FIRST PAGE
Dear Dameon Ho,     It was our pleasure to care for you here at [facility]. It is our hope that we were always able to exceed the expected standards for your care during your stay. You were hospitalized due to pneumonia. You were cared for in the [address] [facility] 4th floor by Kale Pate MD under the service of Shawna Stacy MD with the [facility] who covers for your primary care physician (PCP), Nikunj Fenton MD, while you were hospitalized. If you have any questions or concerns related to this hospitalization, you may contact us at [phone]. For follow up as well as any medication refills, we recommend that you follow up with your primary care physician. A registered nurse will reach out to you by phone within a few days after your discharge to answer any additional questions that you may have after going home. However, at this time we provide for you here, the most important instructions / recommendations at discharge:     Notable Medication Adjustments -   Continue taking your antibiotic, Cefpodoxime, as prescribed until therapy is complete  Testing Required after Discharge -   none  Important follow up information -   Please follow up with PCP, please consider stopping smoking and consult with your pcp regarding quitting smoking   Other Instructions -   If you develop severe pain that does not respond to medication, intractable vomiting or diarrhea, chest pain or shortness of breath, please seek emergent care in the ED. Please review this entire after visit summary as additional general instructions including medication list, appointments, activity, diet, any pertinent wound care, and other additional recommendations from your care team that may be provided for you.       Sincerely,     Kale Pate MD

## 2023-08-15 NOTE — ED NOTES
Patient ripped off leads, continuously removes pulse ox and nasal canula     Cassandra Pate, LESLIE  08/15/23 0827

## 2023-08-15 NOTE — ASSESSMENT & PLAN NOTE
• 2/2 PNA of RLL  • Chronic smoker, half a pack per day  • Satting 80s on room air, found on 4 L satting in mid 90s.   • VBG without hypercapnia  • Baseline is room air     • Follow blood cultures  • Collect sputum cultures  • Continue ceftriaxone and azithromycin  • Urine Legionella, if negative discontinue azithromycin  • A.m. CBC, procalcitonin  • trend fever curves  • Continue IV fluids 75 cc/h for 10 hours  • Wean O2 as tolerated  • Management of PNA as noted  • Incentive spirometry  • Encourage smoking cessation

## 2023-08-15 NOTE — ASSESSMENT & PLAN NOTE
• CC: Shortness of breath with weakness and lethargy  • Noted on CXR - right lower lobe consolidation, likely CAP  • Met sepsis criteria on presentation  • No recent hospitalizations, no sick contacts     • Ceftriaxone and azithromycin  • Urine Legionella  • Collect sputum culture  • Rest of plan as noted above

## 2023-08-15 NOTE — ASSESSMENT & PLAN NOTE
Recent Labs     08/16/23  0504   SODIUM 131*       • Sodium improved to 128 from 124  • Likely in the setting of poor p.o. intake since 2 days feeling sick  • Received 1 L bolus of NS in ED        • Encourage PO hydration/intake  • F/u w/PCP

## 2023-08-16 VITALS
SYSTOLIC BLOOD PRESSURE: 98 MMHG | OXYGEN SATURATION: 89 % | TEMPERATURE: 97.4 F | DIASTOLIC BLOOD PRESSURE: 51 MMHG | RESPIRATION RATE: 16 BRPM | HEART RATE: 78 BPM | BODY MASS INDEX: 22.74 KG/M2 | WEIGHT: 140.87 LBS

## 2023-08-16 LAB
ANION GAP SERPL CALCULATED.3IONS-SCNC: 6 MMOL/L
BASOPHILS # BLD AUTO: 0.02 THOUSANDS/ÂΜL (ref 0–0.1)
BASOPHILS NFR BLD AUTO: 0 % (ref 0–1)
BUN SERPL-MCNC: 23 MG/DL (ref 5–25)
CALCIUM SERPL-MCNC: 8.2 MG/DL (ref 8.4–10.2)
CHLORIDE SERPL-SCNC: 99 MMOL/L (ref 96–108)
CO2 SERPL-SCNC: 26 MMOL/L (ref 21–32)
CREAT SERPL-MCNC: 0.71 MG/DL (ref 0.6–1.3)
EOSINOPHIL # BLD AUTO: 0.18 THOUSAND/ÂΜL (ref 0–0.61)
EOSINOPHIL NFR BLD AUTO: 3 % (ref 0–6)
ERYTHROCYTE [DISTWIDTH] IN BLOOD BY AUTOMATED COUNT: 13.5 % (ref 11.6–15.1)
GFR SERPL CREATININE-BSD FRML MDRD: 92 ML/MIN/1.73SQ M
GLUCOSE SERPL-MCNC: 117 MG/DL (ref 65–140)
GLUCOSE SERPL-MCNC: 161 MG/DL (ref 65–140)
GLUCOSE SERPL-MCNC: 244 MG/DL (ref 65–140)
GLUCOSE SERPL-MCNC: 297 MG/DL (ref 65–140)
HCT VFR BLD AUTO: 36.9 % (ref 36.5–49.3)
HGB BLD-MCNC: 12.6 G/DL (ref 12–17)
IMM GRANULOCYTES # BLD AUTO: 0.07 THOUSAND/UL (ref 0–0.2)
IMM GRANULOCYTES NFR BLD AUTO: 1 % (ref 0–2)
LYMPHOCYTES # BLD AUTO: 0.52 THOUSANDS/ÂΜL (ref 0.6–4.47)
LYMPHOCYTES NFR BLD AUTO: 8 % (ref 14–44)
MCH RBC QN AUTO: 31.9 PG (ref 26.8–34.3)
MCHC RBC AUTO-ENTMCNC: 34.1 G/DL (ref 31.4–37.4)
MCV RBC AUTO: 93 FL (ref 82–98)
MONOCYTES # BLD AUTO: 0.15 THOUSAND/ÂΜL (ref 0.17–1.22)
MONOCYTES NFR BLD AUTO: 2 % (ref 4–12)
NEUTROPHILS # BLD AUTO: 5.47 THOUSANDS/ÂΜL (ref 1.85–7.62)
NEUTS SEG NFR BLD AUTO: 86 % (ref 43–75)
NRBC BLD AUTO-RTO: 0 /100 WBCS
PLATELET # BLD AUTO: 301 THOUSANDS/UL (ref 149–390)
PMV BLD AUTO: 9.7 FL (ref 8.9–12.7)
POTASSIUM SERPL-SCNC: 3.7 MMOL/L (ref 3.5–5.3)
RBC # BLD AUTO: 3.95 MILLION/UL (ref 3.88–5.62)
SODIUM SERPL-SCNC: 131 MMOL/L (ref 135–147)
WBC # BLD AUTO: 6.41 THOUSAND/UL (ref 4.31–10.16)

## 2023-08-16 PROCEDURE — 82948 REAGENT STRIP/BLOOD GLUCOSE: CPT

## 2023-08-16 PROCEDURE — 97167 OT EVAL HIGH COMPLEX 60 MIN: CPT

## 2023-08-16 PROCEDURE — 80048 BASIC METABOLIC PNL TOTAL CA: CPT

## 2023-08-16 PROCEDURE — 99238 HOSP IP/OBS DSCHRG MGMT 30/<: CPT | Performed by: FAMILY MEDICINE

## 2023-08-16 PROCEDURE — 97163 PT EVAL HIGH COMPLEX 45 MIN: CPT

## 2023-08-16 PROCEDURE — 85025 COMPLETE CBC W/AUTO DIFF WBC: CPT

## 2023-08-16 RX ORDER — CEFPODOXIME PROXETIL 200 MG/1
400 TABLET, FILM COATED ORAL 2 TIMES DAILY WITH MEALS
Qty: 12 TABLET | Refills: 0 | Status: SHIPPED | OUTPATIENT
Start: 2023-08-16 | End: 2023-08-19

## 2023-08-16 RX ORDER — CEFPODOXIME PROXETIL 200 MG/1
400 TABLET, FILM COATED ORAL 2 TIMES DAILY WITH MEALS
Status: DISCONTINUED | OUTPATIENT
Start: 2023-08-16 | End: 2023-08-16 | Stop reason: HOSPADM

## 2023-08-16 RX ADMIN — CHOLESTYRAMINE 4 G: 4 POWDER, FOR SUSPENSION ORAL at 08:35

## 2023-08-16 RX ADMIN — RIVAROXABAN 20 MG: 20 TABLET, FILM COATED ORAL at 08:36

## 2023-08-16 RX ADMIN — PYRIDOSTIGMINE BROMIDE 60 MG: 60 TABLET ORAL at 08:39

## 2023-08-16 RX ADMIN — CARBIDOPA AND LEVODOPA 1 TABLET: 25; 100 TABLET ORAL at 08:36

## 2023-08-16 RX ADMIN — Medication 2000 UNITS: at 08:36

## 2023-08-16 RX ADMIN — ALBUMIN HUMAN 12.5 G: 0.25 SOLUTION INTRAVENOUS at 08:36

## 2023-08-16 RX ADMIN — PYRIDOSTIGMINE BROMIDE 60 MG: 60 TABLET ORAL at 11:56

## 2023-08-16 RX ADMIN — FOLIC ACID 1 MG: 1 TABLET ORAL at 08:36

## 2023-08-16 RX ADMIN — LEVOTHYROXINE SODIUM 112 MCG: 112 TABLET ORAL at 05:08

## 2023-08-16 RX ADMIN — INSULIN LISPRO 14 UNITS: 100 INJECTION, SOLUTION INTRAVENOUS; SUBCUTANEOUS at 11:57

## 2023-08-16 RX ADMIN — INSULIN LISPRO 14 UNITS: 100 INJECTION, SOLUTION INTRAVENOUS; SUBCUTANEOUS at 08:40

## 2023-08-16 RX ADMIN — ACETAMINOPHEN 325 MG: 325 TABLET, FILM COATED ORAL at 05:37

## 2023-08-16 RX ADMIN — INSULIN LISPRO 1 UNITS: 100 INJECTION, SOLUTION INTRAVENOUS; SUBCUTANEOUS at 11:56

## 2023-08-16 RX ADMIN — AZITHROMYCIN MONOHYDRATE 500 MG: 500 INJECTION, POWDER, LYOPHILIZED, FOR SOLUTION INTRAVENOUS at 03:58

## 2023-08-16 RX ADMIN — INSULIN LISPRO 1 UNITS: 100 INJECTION, SOLUTION INTRAVENOUS; SUBCUTANEOUS at 08:39

## 2023-08-16 RX ADMIN — PANTOPRAZOLE SODIUM 40 MG: 40 TABLET, DELAYED RELEASE ORAL at 05:08

## 2023-08-16 NOTE — PLAN OF CARE
Problem: Potential for Falls  Goal: Patient will remain free of falls  Description: INTERVENTIONS:  - Educate patient/family on patient safety including physical limitations  - Instruct patient to call for assistance with activity   - Consult OT/PT to assist with strengthening/mobility   - Keep Call bell within reach  - Keep bed low and locked with side rails adjusted as appropriate  - Keep care items and personal belongings within reach  - Initiate and maintain comfort rounds  - Make Fall Risk Sign visible to staff  - Offer Toileting every 2 Hours, in advance of need  - Initiate/Maintain alarm  - Obtain necessary fall risk management equipment:   - Apply yellow socks and bracelet for high fall risk patients  - Consider moving patient to room near nurses station  Outcome: Adequate for Discharge     Problem: Prexisting or High Potential for Compromised Skin Integrity  Goal: Skin integrity is maintained or improved  Description: INTERVENTIONS:  - Identify patients at risk for skin breakdown  - Assess and monitor skin integrity  - Assess and monitor nutrition and hydration status  - Monitor labs   - Assess for incontinence   - Turn and reposition patient  - Assist with mobility/ambulation  - Relieve pressure over bony prominences  - Avoid friction and shearing  - Provide appropriate hygiene as needed including keeping skin clean and dry  - Evaluate need for skin moisturizer/barrier cream  - Collaborate with interdisciplinary team   - Patient/family teaching  - Consider wound care consult   Outcome: Adequate for Discharge     Problem: MOBILITY - ADULT  Goal: Maintain or return to baseline ADL function  Description: INTERVENTIONS:  -  Assess patient's ability to carry out ADLs; assess patient's baseline for ADL function and identify physical deficits which impact ability to perform ADLs (bathing, care of mouth/teeth, toileting, grooming, dressing, etc.)  - Assess/evaluate cause of self-care deficits   - Assess range of motion  - Assess patient's mobility; develop plan if impaired  - Assess patient's need for assistive devices and provide as appropriate  - Encourage maximum independence but intervene and supervise when necessary  - Involve family in performance of ADLs  - Assess for home care needs following discharge   - Consider OT consult to assist with ADL evaluation and planning for discharge  - Provide patient education as appropriate  Outcome: Adequate for Discharge  Goal: Maintains/Returns to pre admission functional level  Description: INTERVENTIONS:  - Perform BMAT or MOVE assessment daily.   - Set and communicate daily mobility goal to care team and patient/family/caregiver. - Collaborate with rehabilitation services on mobility goals if consulted  - Perform Range of Motion 2 times a day. - Reposition patient every 2 hours. - Dangle patient 2 times a day  - Stand patient 2 times a day  - Ambulate patient 2 times a day  - Out of bed to chair 2 times a day   - Out of bed for meals 2 times a day  - Out of bed for toileting  - Record patient progress and toleration of activity level   Outcome: Adequate for Discharge     Problem: PHYSICAL THERAPY ADULT  Goal: Performs mobility at highest level of function for planned discharge setting. See evaluation for individualized goals. Description: Treatment/Interventions: Functional transfer training, LE strengthening/ROM, Therapeutic exercise, Endurance training, Cognitive reorientation, Patient/family training, Equipment eval/education, Bed mobility, Gait training, Compensatory technique education          See flowsheet documentation for full assessment, interventions and recommendations. Outcome: Adequate for Discharge     Problem: OCCUPATIONAL THERAPY ADULT  Goal: Performs self-care activities at highest level of function for planned discharge setting. See evaluation for individualized goals.   Description: Treatment Interventions: ADL retraining, Functional transfer training, UE strengthening/ROM, Endurance training, Cognitive reorientation, Patient/family training, Neuromuscular reeducation, Compensatory technique education, Equipment evaluation/education, Energy conservation (cog assessment)          See flowsheet documentation for full assessment, interventions and recommendations.    Outcome: Adequate for Discharge

## 2023-08-16 NOTE — PLAN OF CARE
Problem: Potential for Falls  Goal: Patient will remain free of falls  Description: INTERVENTIONS:  - Educate patient/family on patient safety including physical limitations  - Instruct patient to call for assistance with activity   - Consult OT/PT to assist with strengthening/mobility   - Keep Call bell within reach  - Keep bed low and locked with side rails adjusted as appropriate  - Keep care items and personal belongings within reach  - Initiate and maintain comfort rounds  - Make Fall Risk Sign visible to staff  - Offer Toileting every  Hours, in advance of need  - Initiate/Maintain alarm  - Obtain necessary fall risk management equipment:   - Apply yellow socks and bracelet for high fall risk patients  - Consider moving patient to room near nurses station  Outcome: Progressing     Problem: Prexisting or High Potential for Compromised Skin Integrity  Goal: Skin integrity is maintained or improved  Description: INTERVENTIONS:  - Identify patients at risk for skin breakdown  - Assess and monitor skin integrity  - Assess and monitor nutrition and hydration status  - Monitor labs   - Assess for incontinence   - Turn and reposition patient  - Assist with mobility/ambulation  - Relieve pressure over bony prominences  - Avoid friction and shearing  - Provide appropriate hygiene as needed including keeping skin clean and dry  - Evaluate need for skin moisturizer/barrier cream  - Collaborate with interdisciplinary team   - Patient/family teaching  - Consider wound care consult   Outcome: Progressing     Problem: MOBILITY - ADULT  Goal: Maintain or return to baseline ADL function  Description: INTERVENTIONS:  -  Assess patient's ability to carry out ADLs; assess patient's baseline for ADL function and identify physical deficits which impact ability to perform ADLs (bathing, care of mouth/teeth, toileting, grooming, dressing, etc.)  - Assess/evaluate cause of self-care deficits   - Assess range of motion  - Assess patient's mobility; develop plan if impaired  - Assess patient's need for assistive devices and provide as appropriate  - Encourage maximum independence but intervene and supervise when necessary  - Involve family in performance of ADLs  - Assess for home care needs following discharge   - Consider OT consult to assist with ADL evaluation and planning for discharge  - Provide patient education as appropriate  Outcome: Progressing  Goal: Maintains/Returns to pre admission functional level  Description: INTERVENTIONS:  - Perform BMAT or MOVE assessment daily.   - Set and communicate daily mobility goal to care team and patient/family/caregiver. - Collaborate with rehabilitation services on mobility goals if consulted  - Perform Range of Motion  times a day. - Reposition patient every  hours.   - Dangle patient  times a day  - Stand patient  times a day  - Ambulate patient  times a day  - Out of bed to chair  times a day   - Out of bed for meal times a day  - Out of bed for toileting  - Record patient progress and toleration of activity level   Outcome: Progressing

## 2023-08-16 NOTE — PLAN OF CARE
Problem: PHYSICAL THERAPY ADULT  Goal: Performs mobility at highest level of function for planned discharge setting. See evaluation for individualized goals. Description: Treatment/Interventions: Functional transfer training, LE strengthening/ROM, Therapeutic exercise, Endurance training, Cognitive reorientation, Patient/family training, Equipment eval/education, Bed mobility, Gait training, Compensatory technique education          See flowsheet documentation for full assessment, interventions and recommendations. 8/16/2023 1329 by Koffi Gaffney PT  Note: Prognosis: Fair  Problem List: Decreased strength, Decreased endurance, Impaired balance, Decreased mobility, Decreased cognition, Decreased safety awareness, Impaired judgement  Assessment: Cecille Romero is a 74 y.o. Male who [facility]14[facility]23 due to lethargy. Orders for PT eval and treat received, w/ activity orders of up and OOB as tolerated. Comorbidities affecting pt's functional mobility at time of evaluation include: pneumonia, DM, chronic obstructive lung disease, Parkinson's Disease. Personal factors affecting DC include: inability to ambulate household distances, inability to navigate level surfaces w/o external assistance, decreased cognition and limited insight into impairments. At baseline, pt mobilizes independently w/ no AD, and w/ 0 fall(s) in the previous 6 months. Upon evaluation, pt presents w/ the following deficits: impaired strength, impaired balance, impaired cognition, decreased safety awareness, decreased endurance/activity tolerance and gait deviations. Pt currently requires mod Ax1 for bed mobility, mod Ax1 for transfers, mod Ax1 w/ hand-held assist for ambulation.  Pt's clinical presentation is unstable/unpredictable due to poor blood sugar control, need for increased assistance w/ functional mobility compared to baseline, need for input for mobility technique, need for input for task focus, need to input for safety awareness, ongoing medical management. From a PT/mobility standpoint given the above findings, DC recommendation is: Post-acute inpatient rehabilitation. During current admission, pt will benefit from continued skilled inpatient PT in the acute care setting in order to address the above deficits and to maximize function and mobility prior to DC from acute care. PT Discharge Recommendation: Post acute rehabilitation services    See flowsheet documentation for full assessment.

## 2023-08-16 NOTE — OCCUPATIONAL THERAPY NOTE
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 [facility])     Prostate cancer ([address])     Thyroid cancer [facility])      Past Surgical History  Past Surgical History:   Procedure Laterality Date    THYROIDECTOMY               08/16/23 1057   OT Last Visit   OT Visit Date 08/16/23   Note Type   Note type Evaluation   Pain Assessment   Pain Assessment Tool 0-10   Pain Score No Pain   Restrictions/Precautions   Weight Bearing Precautions Per Order No   Other Precautions Chair Alarm; Bed Alarm;Cognitive; Fall Risk   Home Living   Type of [address] [place] [address] One level;Performs ADLs on one level; Able to live on main level with bedroom/bathroom; Access  (0 JOAQUIM)   Bathroom Shower/Tub Tub/shower unit   Bathroom Equipment Shower chair   Bathroom Accessibility Accessible   Home Equipment Walker;Cane   Additional Comments no AD at baseline   Prior Function   Level of [place] Independent with functional mobility; Independent with ADLs; Needs assistance with IADLS   Lives With   (roommate)   Receives Help From Friend(s)  (roommate,)   IADLs Independent with meal prep; Independent with medication management; Family/Friend/Other provides transportation  (neighbor vs roommate assists w/ transportation and grocery shopping)   Falls in the last 6 months 0  (pt denies, questionable accuracy)   Vocational Retired   Comments Pt resistent to further questioning during evaluation, limited information provided. Lifestyle   Autonomy At baseline, pt is (I) with ADLs, no AD.  A with IADLs from roommate, friend. Lives c roommate in accessible apartmet. (-) driving   Reciprocal Relationships roommate, friends. Service to Others retired   Intrinsic Gratification none stated   General   Additional Pertinent History Pt admitted d/t generalied weakness, ARF c hypoxia, PNA. On RA SpO2 88-90% throughout evaluation. PMHx: Parkinsons, COPD   Family/Caregiver Present No   Subjective   Subjective pt irritable throughout session, resistent to questioning. ADL   Eating Assistance 6  Modified independent   Grooming Assistance 5  Supervision/Setup   Grooming Deficit   (in seated position)   UB Bathing Assistance 5  Supervision/Setup   LB Bathing Assistance 3  Moderate Assistance   UB Dressing Assistance 3  Moderate Assistance   UB Dressing Deficit   (donned/doffed gown)   LB Dressing Assistance 3  Moderate [facility] [address]  1  Total Assistance   Toileting Deficit   (pt grossly incontinent of urine upon arrival requiring full linen/sheet change. Questionable awareness of incontinence, need for hygeine)   Functional Assistance 3  Moderate Assistance   Additional Comments Did not observe UB bathing, LB bathing and LB dressing at time of evaluation, with use of clinical reasoning, pt's performance throughout evaluation indicates that pt may be able to perform these tasks at the levels listed above   Bed Mobility   Supine to Sit 3  Moderate assistance   Additional items Assist x 1; Increased time required;Verbal cues   Sit to Supine   (seated OOB in recliner at end of session)   Additional Comments Maintains static sitting balance with supervision   Transfers   Sit to Stand 3  Moderate assistance   Additional items Assist x 1; Increased time required;Verbal cues  (HHA)   Stand to Sit 3  Moderate assistance   Additional items Assist x 1; Increased time required;Verbal cues  (HHA)   Additional Comments HHA and Min A to maintain static standing balance.  Tolerates standing x 30s for dependent posterior hygiene care.  VC for postural corrections   Functional Mobility   Functional Mobility 3  Moderate assistance   Additional Comments short distance functional mobility bed > recliner, pt declining further. Mod s/s of exertion, Spo2 88% on RA following mobility. Additional items Hand hold assistance   Balance   Static Sitting Fair +   Dynamic Sitting Fair   Static Standing Poor +   Dynamic Standing Poor   Activity Tolerance   Activity Tolerance Other (Comment); Patient limited by fatigue  (self limiting)   Medical Staff [id number] coordination c PT Ирина. CM. [facility] resident [facility]   Nurse Made Aware LESLIE Dutta present througout session   RUE Assessment   RUE Assessment WFL  (MMT grossly 4-/5 based on functional assessment)   LUE Assessment   LUE Assessment WFL  (MMT grossly 4-/5 based on functional assessment)   Cognition   Overall Cognitive Status Impaired   Arousal/Participation Responsive  (encouragement required for participation)   Attention Attends with cues to redirect   Orientation Level Oriented to person  (pt refusing further orientaton questions, stating "I'm not answering any other questions")   Memory Decreased recall of recent events;Decreased recall of precautions   Following Commands Follows one step commands with increased time or repetition   Comments (S)  Pt demonstrates poor insight to deficits and problem solving, poor safety awareness. Functional assessment limited d/t pt declining further questioning. Questionable comprehension of medical status   Assessment   Limitation Decreased ADL status; Decreased UE strength;Decreased cognition;Decreased endurance;Decreased self-care trans;Decreased high-level ADLs; Decreased Safe judgement during ADL  (decreased trunk control, balance, standing tolerance, functional reach)   Prognosis Good   Assessment Patient is a 74 y.o. male seen for OT evaluation at [address] [place] [address] following admission on 8/14/2023  S/p PNA.  Please see above for comprehensive list of comorbidities and significant PMHx impacting functional performance. At baseline, pt is (I) with ADLs, no AD. Upon initial evaluation, pt appears to be performing below baseline functional status. Occupational performance is affected by the following deficits: endurance ,  decreased muscular strength , decreased standing tolerance for self care tasks , decreased dynamic balance impacting functional reach, decreased trunk control , decreased activity tolerance , impaired judgement and problem solving  and impaired safety awareness . Personal/Environmental factors impacting D/C include: Assistance needed for ADLs and functional mobility and decreased insight toward deficits . Supporting factors include: accessible home environment Patient would benefit from OT services within the acute care setting to maximize level of functional independence in the following areas self-care transfers, functional mobility, formal cognitive evaluation and ADLs. From OT standpoint, recommendation at time of D/C would be post-acute rehabilitation . Goals   Patient Goals none stated by patient, will continue to assess   Plan   Treatment Interventions ADL retraining;Functional transfer training;UE strengthening/ROM; Endurance training;Cognitive reorientation;Patient/family training;Neuromuscular reeducation; Compensatory technique education;Equipment evaluation/education; Energy conservation  (cog assessment)   Goal Expiration Date 08/26/23   OT Treatment Day 0   OT Frequency 3-5x/wk   Recommendation   OT Discharge Recommendation Post acute rehabilitation services   AM-PAC Daily Activity Inpatient   Lower Body Dressing 2   Bathing 2   Toileting 2   Upper Body Dressing 3   Grooming 3   Eating 4   Daily Activity Raw Score 16   Daily Activity Standardized Score (Calc for Raw Score >=11) 35.96   AM-PAC Applied Cognition Inpatient   Following a Speech/Presentation 2   Understanding Ordinary Conversation 3   Taking Medications 2   Remembering Where Things Are Placed or Put Away 3   Remembering List of 4-5 Errands 2   Taking Care of Complicated Tasks 2   Applied Cognition Raw Score 14   Applied Cognition Standardized Score 32.02   End of Consult   Education Provided Yes   Patient Position at End of Consult Bedside chair;Bed/Chair alarm activated; All needs within reach   Nurse Communication Nurse aware of consult  Sophy Ginger)   Goals established on initial evaluation in order to maximize functional independence and activity tolerance for ADLs     Pt will complete grooming tasks Supervision standing at sink  for increased ADL independence within 10 days. Pt will complete LB ADLs Min A  for increased ADL independence within 10 days. Pt will complete toileting Min A  with use of DME for increased ADL independence within 10 days. Pt will demonstrate proper body mechanics to complete self-care transfers and functional mobility with Supervision and use of LRAD for increased safety and functional independence within 10 days. Pt will demonstrate standing tolerance of 5 min with Supervision and use of LRAD for increased activity tolerance during ADL/IADL tasks within 10 days. Pt will complete bed mobility Supervision for increased independence in repositioning, pressure offloading, and managing comfort. Pt will demonstrate proper body mechanics and fall prevention strategies during 100% of tx sessions for increased safety awareness during ADL/IADLs    Pt will demonstrate activity tolerance of 30 min in therapeutic tasks for increased participation in meaningful activities upon D/C. Pt will participate in ongoing cognitive assessments to assist with safe D/C planning and supervision/assistance recommendations. Pt will demonstrate OOB sitting tolerance of 2-4 hr/day for increased activity tolerance and engagement in leisure activities within 10 days.        Pt benefited from co-session of skilled OT and PT therapists in order to most appropriately address functional deficits d/t extensive physical assistance required for safe mobility  and decreased activity tolerance. OT/PT objectives were addressed separately; please see PT note for specific goal areas targeted.     Bernabe Castanon, OT

## 2023-08-16 NOTE — PHYSICAL THERAPY NOTE
PHYSICAL THERAPY EVALUATION NOTE          Patient Name: Jennifer OBREGON Date: 2023          AGE:   74 y.o. Mrn:   [id number]  ADMIT DX:  Dehydration [E86.0]  Hyponatremia [E87.1]  Altered mental status [R41.82]  Lethargy [R53.83]  Abdominal pain, unspecified abdominal location [R10.9]  Multifocal pneumonia [J18.9]    Past Medical History:  Past Medical History:   Diagnosis Date    Diabetes mellitus ([facility])     Disease of thyroid gland     History of blood clots     Myasthenia due to diabetes [facility])     Prostate cancer ([facility])     Thyroid cancer ([facility])        Past Surgical History:  Past Surgical History:   Procedure Laterality Date    THYROIDECTOMY       Length Of Stay: 1        PHYSICAL THERAPY EVALUATION:    Patient's identity confirmed via 2 patient identifiers (full name and ) at start of session       23 1115   PT Last Visit   PT Visit Date 23   Note Type   Note type Evaluation   Pain Assessment   Pain Assessment Tool 0-10   Pain Score No Pain   Restrictions/Precautions   Weight Bearing Precautions Per Order No   Other Precautions Cognitive; Chair Alarm; Bed Alarm; Fall Risk   Home Living   Type of [address] [place] [address] One level;Performs ADLs on one level; Able to live on main level with bedroom/bathroom  (0 JOAQUIM)   Bathroom Shower/Tub Tub/shower unit   Bathroom Equipment Shower chair   Bathroom Accessibility Accessible   Home Equipment Walker;Cane   Prior Function   Level of [place] Independent with functional mobility; Independent with ADLs; Needs assistance with IADLS   Lives With   (roommate)   IADLs Independent with meal prep; Independent with medication management; Family/Friend/Other provides transportation  (neighbor vs roommate assists w/ transportation and grocery shopping)   Falls in the last 6 months 0  (pt denies)   Comments Pt reports amb ind w/o AD at baseline   General   Family/Caregiver Present No   Cognition Overall Cognitive Status Impaired   Arousal/Participation Lethargic   Orientation Level Oriented to person   Memory Decreased recall of recent events;Decreased recall of precautions   Following Commands Follows one step commands with increased time or repetition   Comments Pt ID via name and  on wristband. Pt required full bed/linen change due to bed saturated in urine upon arrival. Pt appeared to be unaware of incontinence, additionally pt presents w/ poor safety awareness and problem solving   Subjective   Subjective "I am not answering any more questions", "I feel more human already"   Strength RLE   RLE Overall Strength 3/5  (grossly assessed w/ functional mobility)   Strength LLE   LLE Overall Strength 3/5  (grossly assessed w/ functional mobility)   Coordination   Movements are Fluid and Coordinated 0   Coordination and Movement Description RUE pill rolling tremor   Bed Mobility   Supine to Sit 3  Moderate assistance   Additional items Assist x 1;HOB elevated; Increased time required;Verbal cues;LE management   Transfers   Sit to Stand 3  Moderate assistance   Additional items Assist x 1; Increased time required;Verbal cues   Stand to Sit 3  Moderate assistance   Additional items Assist x 1; Increased time required;Verbal cues   Ambulation/Elevation   Gait pattern Improper Weight shift; Wide PAMELA; Decreased foot clearance; Short stride; Excessively slow;Decreased hip extension   Gait Assistance 3  Moderate assist   Additional items Assist x 1;Verbal cues   Assistive Device None  (hand-held assist)   Balance   Static Sitting Fair -   Dynamic Sitting Poor +   Static Standing Poor +   Dynamic Standing Poor   Ambulatory Poor   Activity Tolerance   Activity Tolerance Patient limited by fatigue  (pt limited by cognition)   Medical Staff Made Aware Pt benefited from PT/OT care coordination w/ OT Keri due to cognitive/behavioral impairments affecting functional mobility, PT and OT goals were addressed individually during session; Resident Johny Spurling   Nurse Made Aware RN Robbin   Assessment   Prognosis Fair   Problem List Decreased strength;Decreased endurance; Impaired balance;Decreased mobility; Decreased cognition;Decreased safety awareness; Impaired judgement   Assessment Harrison Mahmood is a 74 y.o. Male who [facility] on [facility]23 due to lethargy. Orders for PT eval and treat received, w/ activity orders of up and OOB as tolerated. Comorbidities affecting pt's functional mobility at time of evaluation include: pneumonia, DM, chronic obstructive lung disease, Parkinson's Disease. Personal factors affecting DC include: inability to ambulate household distances, inability to navigate level surfaces w/o external assistance, decreased cognition and limited insight into impairments. At baseline, pt mobilizes independently w/ no AD, and w/ 0 fall(s) in the previous 6 months. Upon evaluation, pt presents w/ the following deficits: impaired strength, impaired balance, impaired cognition, decreased safety awareness, decreased endurance/activity tolerance and gait deviations. Pt currently requires mod Ax1 for bed mobility, mod Ax1 for transfers, mod Ax1 w/ hand-held assist for ambulation. Pt's clinical presentation is unstable/unpredictable due to poor blood sugar control, need for increased assistance w/ functional mobility compared to baseline, need for input for mobility technique, need for input for task focus, need to input for safety awareness, ongoing medical management. From a PT/mobility standpoint given the above findings, DC recommendation is: Post-acute inpatient rehabilitation. During current admission, pt will benefit from continued skilled inpatient PT in the acute care setting in order to address the above deficits and to maximize function and mobility prior to DC from acute care.    Goals   STG Expiration Date 08/26/23   Short Term Goal #1 Pt will: perform bed mobility w/ mod I to decrease pt's burden of care and increase pt's independence w/ repositioning in bed; perform transfers w/ mod I to promote increased OOB mobility; ambulate at least 150' w/ LRAD and mod I to increase pt's ambulatory endurance/tolerance; increase all balance ratings by at least 1 grade to decrease pt's risk of falls   PT Treatment Day 0   Plan   Treatment/Interventions Functional transfer training;LE strengthening/ROM; Therapeutic exercise; Endurance training;Cognitive reorientation;Patient/family training;Equipment eval/education; Bed mobility;Gait training; Compensatory technique education   PT Frequency 3-5x/wk   Recommendation   PT Discharge Recommendation Post acute rehabilitation services   AM-PAC Basic Mobility Inpatient   Turning in Flat Bed Without Bedrails 3   Lying on Back to Sitting on Edge of Flat Bed Without Bedrails 2   Moving Bed to Chair 2   Standing Up From Chair Using Arms 2   Walk in Room 2   Climb 3-5 Stairs With Railing 2   Basic Mobility Inpatient Raw Score 13   Basic Mobility Standardized Score 33.99   Highest Level Of Mobility   -[facility] Goal 4: Move to chair/commode   -[facility] Achieved 4: Move to chair/commode   End of Consult   Patient Position at End of Consult Bedside chair;Bed/Chair alarm activated; All needs within reach       The patient's AM-PAC Basic Mobility Inpatient Short Form Raw Score is 13. A Raw score of less than or equal to 16 suggests the patient may benefit from discharge to post-acute rehabilitation services. Please also refer to the recommendation of the Physical Therapist for safe discharge planning.     Pt will benefit from skilled inpatient PT during this admission in order to facilitate progress towards goals and to maximize functional independence prior to [facility] rec: post acute rehab        Ofe Edmond, PT, DPT  08/16/23

## 2023-08-16 NOTE — PLAN OF CARE
Problem: OCCUPATIONAL THERAPY ADULT  Goal: Performs self-care activities at highest level of function for planned discharge setting. See evaluation for individualized goals. Description: Treatment Interventions: ADL retraining, Functional transfer training, UE strengthening/ROM, Endurance training, Cognitive reorientation, Patient/family training, Neuromuscular reeducation, Compensatory technique education, Equipment evaluation/education, Energy conservation (cog assessment)          See flowsheet documentation for full assessment, interventions and recommendations. 8/16/2023 1339 by Jany Vyas, OT  Note: Limitation: Decreased ADL status, Decreased UE strength, Decreased cognition, Decreased endurance, Decreased self-care trans, Decreased high-level ADLs, Decreased Safe judgement during ADL (decreased trunk control, balance, standing tolerance, functional reach)  Prognosis: Good  Assessment: Patient is a 74 y.o. male seen for OT evaluation at [address] [place] [address] following admission on 8/14/2023  S/p PNA. Please see above for comprehensive list of comorbidities and significant PMHx impacting functional performance. At baseline, pt is (I) with ADLs, no AD. Upon initial evaluation, pt appears to be performing below baseline functional status. Occupational performance is affected by the following deficits: endurance ,  decreased muscular strength , decreased standing tolerance for self care tasks , decreased dynamic balance impacting functional reach, decreased trunk control , decreased activity tolerance , impaired judgement and problem solving  and impaired safety awareness . Personal/Environmental factors impacting D/C include: Assistance needed for ADLs and functional mobility and decreased insight toward deficits .  Supporting factors include: accessible home environment Patient would benefit from OT services within the acute care setting to maximize level of functional independence in the following areas self-care transfers, functional mobility, formal cognitive evaluation and ADLs. From OT standpoint, recommendation at time of D/C would be post-acute rehabilitation . OT Discharge Recommendation: Post acute rehabilitation services       8/16/2023 1339 by Jv Corrales OT  Note: Limitation: Decreased ADL status, Decreased UE strength, Decreased cognition, Decreased endurance, Decreased self-care trans, Decreased high-level ADLs, Decreased Safe judgement during ADL (decreased trunk control, balance, standing tolerance, functional reach)  Prognosis: Good  Assessment: Patient is a 74 y.o. male seen for OT evaluation at [address] [place] [address] following admission on 8/14/2023  S/p PNA. Please see above for comprehensive list of comorbidities and significant PMHx impacting functional performance. At baseline, pt is (I) with ADLs, no AD. Upon initial evaluation, pt appears to be performing below baseline functional status. Occupational performance is affected by the following deficits: endurance ,  decreased muscular strength , decreased standing tolerance for self care tasks , decreased dynamic balance impacting functional reach, decreased trunk control , decreased activity tolerance , impaired judgement and problem solving  and impaired safety awareness . Personal/Environmental factors impacting D/C include: Assistance needed for ADLs and functional mobility and decreased insight toward deficits . Supporting factors include: accessible home environment Patient would benefit from OT services within the acute care setting to maximize level of functional independence in the following areas self-care transfers, functional mobility, formal cognitive evaluation and ADLs. From OT standpoint, recommendation at time of D/C would be post-acute rehabilitation .      OT Discharge Recommendation: Post acute rehabilitation services     Jv Corrales OT

## 2023-08-16 NOTE — CASE MANAGEMENT
Case Management Assessment & Discharge Planning Note    Patient name Jose Garsia  Location W MS 0/W -01 MRN [id number]  : 1950 Date 2023       Current Admission Date: 2023  Current Admission Diagnosis:PNA (pneumonia)   Patient Active Problem List    Diagnosis Date Noted   • PNA (pneumonia) 2023   • Acute respiratory failure with hypoxia ([address]) 2023   • Diabetes mellitus with hyperglycemia ([address]) 2023   • Hyponatremia 2023   • Personal history of DVT and PE 2023   • Generalized weakness 2023   • Hypothyroidism 2023   • Parkinson's disease ([address]) 2021   • Abdominal pain 2020   • Diabetes mellitus ([address]) 2020   • Chronic obstructive lung disease ([address]) 2020   • Malignant neoplasm of prostate ([address]) 2020   • Mixed hyperlipidemia 2020   • Jejunitis 2020   • Sepsis ([address]) 2020   • Gastrointestinal hemorrhage with melena 2020   • Supratherapeutic INR 2020   • Myasthenia gravis with exacerbation ([address]) 2019      LOS (days): 1  Geometric Mean LOS (GMLOS) (days):   Days to GMLOS:     OBJECTIVE:  PATIENT READMITTED TO HOSPITAL  Risk of Unplanned Readmission Score: 23.94         Current admission status: Inpatient       Preferred Pharmacy:   [facility] [place], [address] [place] [address]  Phone: [phone] Fax: [address] [address] [address] [address] [address] [address] [facility] [address]  Phone: [phone] Fax: [facility] [address] [address]  Phone: [phone] Fax: [phone]    Primary Care Provider: Deangelo Lund MD    Primary Insurance: [facility]  Secondary Insurance:     ASSESSMENT:  Active Health Care Proxies    There are no active Health Care Proxies on file.        Readmission Root Cause  30 Day Readmission: Yes  Patient was readmitted due to: PNA    Patient Information  Admitted from[de-identified] Home  Mental Status: Confused  During Assessment patient was accompanied by: Other-Comment (N/A)  Assessment information provided by[de-identified] Sister  Primary Caregiver: Self  Support Systems: Friends/neighbors, Family members  [place] of Residence: [facility] do you live in?: [facility] entry access options.  Select all that apply.: No steps to enter home  [facility]  In the last 12 months, was there a time when you were not able to pay the mortgage or rent on time?: No  In the last 12 months, how many places have you lived?: 1  In the last 12 months, was there a time when you did not have a steady place to sleep or slept in a shelter (including now)?: No  Homeless/housing insecurity resource given?: N/A  Living Arrangements: Lives w/ Friend  Is patient a ?: Yes  Is patient active with [facility])?: Yes  Is patient service connected?: Yes Gorge Hensley    Activities of Daily Living Prior to Admission  Functional Status: Independent  Completes ADLs independently?: Yes  Ambulates independently?: Yes  Does patient use assisted devices?: Yes  Assisted Devices (DME) used: Roshniailyn King  Does patient currently own DME?: Yes  What DME does the patient currently own?: Roshni King  Does patient have a history of Outpatient Therapy (PT/OT)?: Yes  Does the patient have a history of Short-Term Rehab?: No  Does patient have a history of HHC?: Yes  Does patient currently have [facility]?: No    Patient Information Continued  Income Source: Pension/penitentiary  Does patient have prescription coverage?: Yes  Within the past 12 months, you worried that your food would run out before you got the money to buy more.: Never true  Within the past 12 months, the food you bought just didn't last and you didn't have money to get more.: Never true  Food insecurity resource given?: N/A    PHQ 2/9 Screening Reviewed PHQ 2/9 Depression Screening Score?: No    Means of Transportation  Means of Transport to Appts[de-identified] Friends  In the past 12 months, has lack of transportation kept you from medical appointments or from getting medications?: No  In the past 12 months, has lack of transportation kept you from meetings, work, or from getting things needed for daily living?: No  Was application for public transport provided?: N/A        DISCHARGE DETAILS:    Discharge planning discussed with[de-identified] patients sister- Ryan Woodward over phone  Freedom of Choice: Yes  Comments - Freedom of Choice: home w/ [address] [address] [address] [facility]  CM contacted family/caregiver?: Yes  Were Treatment Team discharge recommendations reviewed with patient/caregiver?: Yes  Did patient/caregiver verbalize understanding of patient care needs?: Yes  Were patient/caregiver advised of the risks associated with not following Treatment Team discharge recommendations?: Yes    Contacts  Patient Contacts: Ryan Woodward  Relationship to Patient[de-identified] Family (sister)  Contact Method: Phone  Phone Number: [phone]  Reason/Outcome: Continuity of Care, Emergency Contact, Referral, Discharge [address]         Is the patient interested in [address] at discharge?: Yes  [facility] requested[de-identified] Nursing, Physical Therapy, Occupational [address] Name[de-identified] [address]  Provider[de-identified] PCP  Home Health Services Needed[de-identified] Gait/ADL Training, Strengthening/Theraputic Exercises to Improve Function, Evaluate Functional Status and Safety  Homebound Criteria Met[de-identified] Requires the Assistance of Another Person for Safe Ambulation or to Leave the Home  Supporting Clincal Findings[de-identified] Cognitive Deficit Requiring the Assistance of Others    DME Referral Provided  Referral made for DME?: No    Other Referral/Resources/Interventions Provided:  Interventions: [facility]  Referral Comments: [facility] due to PNA.  Call made to patients sister Ryan Woodward to complete assessment and discuss dcp, as patient is noted to be confused. Patients sister reports that patient can be confused. Stating patient lives on a first floor, 0STE. Patient lives with roommate Shanita Amato who is very supportive. Patient owns a walker, has a history of STR and HHC. CM discussed with sister PT/OT rec for STR- sister stating she does not think patient would like STR and would like to have him set up with [facility]- declining any need to have DME ordered. Cm spoke with Aly Weber from the [address] [place] [address] ([phone] ext. [phone]). Aly Weber stating patient is able to be set up with [facility] and they will authorize, just need a name of accepting [facility] agency. Referrals placed via [facility] Right initially accepting but then declined due to patient leaving AMA- patient now set up with [address] [place] [address]. Aly Weber notified via . Sister aware [facility] will be [facility]. Patients sister to pick patient up. No further CM needs anticipated at this time.     Would you like to participate in our [facility] [employer] service program?  : No - Declined    Treatment Team Recommendation: [facility] , Short Term Rehab  Discharge Destination Plan[de-identified] Home with [address] [place] at Discharge : Family, sister

## 2023-08-18 NOTE — DISCHARGE SUMMARY
[address] [facility]  Discharge- Pierre Castro 1950, 74 y.o. male MRN: [id number]  Unit/Bed#: W -83 Encounter: [id number]  Primary Care Provider: Bill Crigler, MD   Date and time admitted to hospital: 8/14/2023  9:47 PM    * PNA (pneumonia)  Assessment & Plan  CC: Shortness of breath with weakness and lethargy  • Noted on CXR - right lower lobe consolidation, likely CAP  • No recent hospitalizations, no sick contacts  • S/p 2 days Ceftriaxone and azithromycin  • Urine Legionella unable to be obtained  • Rest of plan as noted above    Plan:  · Pt medically stable for DC on oral abx (Vantin) for total 5-day course  · PT recommending STR however pt is refusing at this time; pt to be discharged with home PT       Hypothyroidism  Assessment & Plan  • Home regimen includes levothyroxine 112 mcg twice a day as per chart review  • Patient unsure of home medications as roommate helps with meds. Attempted call to roommate but could not reach.     • We will need to call patient's roommate to confirm home regimen  • Continue levothyroxine 112 mcg daily for now until home regimen is confirmed    Generalized weakness  Assessment & Plan  • Baseline: Uses walker or cane. Occasionally walks without device. • Reports generalized weakness and some lethargy. Likely in the setting of acute infection.   • Patient agreed on rehab placement yesterday if needed     • PT/OT eval and treat    Personal history of DVT and PE  Assessment & Plan  • Reports he has a history of DVT and PE  • Home regimen includes rivaroxaban 20 mg daily.      • Continue home regimen    Hyponatremia  Assessment & Plan  Recent Labs     08/16/23  0504   SODIUM 131*       • Sodium improved to 128 from 124  • Likely in the setting of poor p.o. intake since 2 days feeling sick  • Received 1 L bolus of NS in ED        • Encourage PO hydration/intake  • F/u w/PCP    Diabetes mellitus with hyperglycemia [facility])  Assessment & Plan  Lab Results   Component Value Date    HGBA1C 12.8 (H) 08/14/2023       Recent Labs     08/15/23  2047 08/16/23  0752 08/16/23  1125 08/16/23  1618   POCGLU 75 297* 161* 117       Blood Sugar Average: Last 72 hrs:  (P) 150.6641852411677198       • Home regimen: Glipizide, saxagliptin, NovoLog 14 units with meals, and glargine 40 units at bedtime  • Diabetic diet    Hemoglobin A1c elevated at 12.8. Educated sister about pt's poor glycemic control and importance of compliance with diet and medication    Acute respiratory failure with hypoxia (HCC)  Assessment & Plan  • 2/2 PNA of RLL  • Chronic smoker, half a pack per day  • Baseline is room air  • Wean O2 as tolerated  • Management of PNA as noted  • Incentive spirometry  • Encourage smoking cessation    Now on RA      Discharging Resident Physician: Maribel Ely MD  Attending: Karlo Linn MD  PCP: Wendy Barton MD  Admission Date: 8/14/2023  Discharge Date: 08/16/23    Disposition:     Home    Reason for Admission: PNA    Consultations During Hospital Stay:  · None    Procedures Performed:     · None    Significant Findings / Test Results:   CT head without contrast   ED Interpretation by Radha Figueroa MD (08/15 0011)   FINDINGS:     PARENCHYMA: Periventricular and subcortical hypoattenuating foci consistent with microangiopathic disease.     No acute intracranial hemorrhage or mass effect.     VENTRICLES AND EXTRA-AXIAL SPACES: No hydrocephalus or extra-axial collection.     VISUALIZED ORBITS: Intact globes and orbits.     PARANASAL SINUSES: Opacification of the left sphenoid sinus and sphenoethmoidal junction, likely chronic.     CALVARIUM AND EXTRACRANIAL SOFT TISSUES: No lytic or blastic lesion or fracture.     IMPRESSION:     No acute intracranial abnormality.                 Workstation performed: [id number]      Final Result by Karan Nagy MD (08/15 0009)      No acute intracranial abnormality.                   Workstation performed: [id number]         XR chest 1 view portable   ED Interpretation by Shanita Payan MD (08/14 9719)   Multifocal pneumonia, worse on R side read by me. Final Result by Adriana Ware MD (23/55 9965)      Persistent right lung base airspace opacity. Cannot exclude small right pleural effusion. Workstation performed: [id number]               Incidental Findings:   · See above     Test Results Pending at Discharge (will require follow up): · Blood cultures     Outpatient Tests Requested:  · None     Complications:  None     Hospital Course:     Chioma Atkinson is a 74 y.o. male patient who originally presented to the hospital on 8/14/2023 due to shortness of breath. He had originally come came to the hospital the night before and was admitted 1 AM due to sepsis 2/2 community-acquired pneumonia, at which time he was started on IV cefepime. However he left AMA around 6 AM and was instructed to  cefdinir and azithromycin. Pt then returned to the hospital due to persistent SOB. However during admission he stated repeatedly that he felt fine and requested to go home. On presentation he did meet sepsis criteria with tachycardia and hypoxia requiring 2L NC. Labs significant for leukocytosis, hyponatremia, hyperglycemia, elevated procalcitonin.  CXR showing RLL PNA. He was again admitted and started on Rocephin and Zithromax. He was ultimately weaned to RA. PT/OT evaluated him who recommended STR, however pt refused, stating adamantly that he wanted to go home. He agreed, though, to home health services.      He was discharged with Vantin to complete his abx course. Condition at Discharge: fair     Discharge Day Visit / Exam:     Subjective:    Pt minimally cooperative with exam. He is begrudgingly answering orientation questions appropriately for me this morning while demanding I leave the room.  He understands that he would benefit from a rehab facility however wants nothing more than to go home. Unable to obtain ROS due to lack of cooperation. Vitals: Blood Pressure: 98/51 (08/16/23 0752)  Pulse: 78 (08/16/23 0752)  Temperature: (!) 97.4 °F (36.3 °C) (08/16/23 0752)  Temp Source: Oral (08/15/23 1044)  Respirations: 16 (08/16/23 0752)  Weight - Scale: 63.9 kg (140 lb 14 oz) (08/14/23 2152)  SpO2: (!) 89 % (08/16/23 0752)     Exam:   Physical Exam  Constitutional:       General: He is not in acute distress. Appearance: He is underweight. He is ill-appearing (Chronically ill-appearing). HENT:      Head: Normocephalic and atraumatic. Mouth/Throat:      Mouth: Mucous membranes are dry. Pharynx: Oropharynx is clear. No oropharyngeal exudate. Cardiovascular:      Rate and Rhythm: Normal rate and regular rhythm. Heart sounds: Normal heart sounds. Pulmonary:      Effort: Pulmonary effort is normal.      Breath sounds: Normal breath sounds. No wheezing, rhonchi or rales. Abdominal:      General: Abdomen is flat. There is no distension. Palpations: Abdomen is soft. Tenderness: There is no abdominal tenderness. Skin:     General: Skin is dry. Neurological:      Mental Status: He is oriented to person, place, and time. Psychiatric:         Mood and Affect: Affect is angry. Behavior: Behavior is uncooperative and withdrawn. Discussion with Family: SisterLilia updated over the phone    Discharge instructions/Information to patient and family:   See after visit summary for information provided to patient and family. Provisions for Follow-Up Care:  See after visit summary for information related to follow-up care and any pertinent home health orders. Planned Readmission: No     Discharge Medications:  See after visit summary for reconciled discharge medications provided to patient and family.       ** Please Note: This note has been constructed using a voice recognition system **

## 2023-08-19 LAB
BACTERIA BLD CULT: NORMAL
BACTERIA BLD CULT: NORMAL

## 2023-08-20 LAB
BACTERIA BLD CULT: NORMAL
BACTERIA BLD CULT: NORMAL

## 2023-10-13 PROBLEM — A41.9 SEPSIS (HCC): Status: RESOLVED | Noted: 2020-05-28 | Resolved: 2023-10-13

## 2023-10-17 PROBLEM — J18.9 PNA (PNEUMONIA): Status: RESOLVED | Noted: 2023-08-14 | Resolved: 2023-10-17

## 2024-08-31 ENCOUNTER — HOSPITAL ENCOUNTER (INPATIENT)
Facility: HOSPITAL | Age: 74
LOS: 1 days | Discharge: LEFT BEFORE DISCHARGE | DRG: 871 | End: 2024-09-01
Attending: EMERGENCY MEDICINE | Admitting: INTERNAL MEDICINE
Payer: COMMERCIAL

## 2024-08-31 DIAGNOSIS — R73.9 HYPERGLYCEMIA: ICD-10-CM

## 2024-08-31 DIAGNOSIS — E83.42 HYPOMAGNESEMIA: ICD-10-CM

## 2024-08-31 DIAGNOSIS — Z87.891 HISTORY OF CIGARETTE SMOKING: ICD-10-CM

## 2024-08-31 DIAGNOSIS — J18.9 PNEUMONIA OF RIGHT UPPER LOBE DUE TO INFECTIOUS ORGANISM: ICD-10-CM

## 2024-08-31 DIAGNOSIS — J18.9 RIGHT UPPER LOBE PNEUMONIA: Primary | ICD-10-CM

## 2024-08-31 DIAGNOSIS — R53.1 GENERALIZED WEAKNESS: ICD-10-CM

## 2024-08-31 DIAGNOSIS — E87.1 HYPONATREMIA: ICD-10-CM

## 2024-08-31 DIAGNOSIS — Z91.148 HISTORY OF MEDICATION NONCOMPLIANCE: ICD-10-CM

## 2024-08-31 DIAGNOSIS — R79.89 ELEVATED TSH: ICD-10-CM

## 2024-08-31 DIAGNOSIS — E11.69 TYPE 2 DIABETES MELLITUS WITH OTHER SPECIFIED COMPLICATION, UNSPECIFIED WHETHER LONG TERM INSULIN USE (HCC): ICD-10-CM

## 2024-08-31 DIAGNOSIS — A41.9 SEPSIS, DUE TO UNSPECIFIED ORGANISM, UNSPECIFIED WHETHER ACUTE ORGAN DYSFUNCTION PRESENT (HCC): ICD-10-CM

## 2024-08-31 DIAGNOSIS — E03.9 HYPOTHYROIDISM, UNSPECIFIED TYPE: ICD-10-CM

## 2024-08-31 LAB — GLUCOSE SERPL-MCNC: 423 MG/DL (ref 65–140)

## 2024-08-31 PROCEDURE — 99285 EMERGENCY DEPT VISIT HI MDM: CPT

## 2024-08-31 PROCEDURE — 93005 ELECTROCARDIOGRAM TRACING: CPT

## 2024-08-31 PROCEDURE — 82948 REAGENT STRIP/BLOOD GLUCOSE: CPT

## 2024-09-01 ENCOUNTER — APPOINTMENT (EMERGENCY)
Dept: RADIOLOGY | Facility: HOSPITAL | Age: 74
DRG: 871 | End: 2024-09-01
Payer: COMMERCIAL

## 2024-09-01 ENCOUNTER — APPOINTMENT (EMERGENCY)
Dept: CT IMAGING | Facility: HOSPITAL | Age: 74
DRG: 871 | End: 2024-09-01
Payer: COMMERCIAL

## 2024-09-01 VITALS
HEIGHT: 66 IN | DIASTOLIC BLOOD PRESSURE: 52 MMHG | BODY MASS INDEX: 23.53 KG/M2 | RESPIRATION RATE: 16 BRPM | OXYGEN SATURATION: 93 % | SYSTOLIC BLOOD PRESSURE: 100 MMHG | HEART RATE: 84 BPM | WEIGHT: 146.39 LBS | TEMPERATURE: 98.5 F

## 2024-09-01 PROBLEM — E11.65 TYPE 2 DIABETES MELLITUS WITH HYPERGLYCEMIA, WITH LONG-TERM CURRENT USE OF INSULIN (HCC): Status: ACTIVE | Noted: 2020-05-28

## 2024-09-01 PROBLEM — J18.9 PNEUMONIA: Status: ACTIVE | Noted: 2024-09-01

## 2024-09-01 PROBLEM — A41.9 SEPSIS (HCC): Status: ACTIVE | Noted: 2024-09-01

## 2024-09-01 PROBLEM — Z79.4 TYPE 2 DIABETES MELLITUS WITH HYPERGLYCEMIA, WITH LONG-TERM CURRENT USE OF INSULIN (HCC): Status: ACTIVE | Noted: 2020-05-28

## 2024-09-01 PROBLEM — Z72.0 TOBACCO ABUSE: Status: ACTIVE | Noted: 2024-09-01

## 2024-09-01 PROBLEM — G70.00 MYASTHENIA GRAVIS (HCC): Status: ACTIVE | Noted: 2024-09-01

## 2024-09-01 LAB
ALBUMIN SERPL BCG-MCNC: 3 G/DL (ref 3.5–5)
ALP SERPL-CCNC: 72 U/L (ref 34–104)
ALT SERPL W P-5'-P-CCNC: 29 U/L (ref 7–52)
ANION GAP SERPL CALCULATED.3IONS-SCNC: 9 MMOL/L (ref 4–13)
AST SERPL W P-5'-P-CCNC: 28 U/L (ref 13–39)
ATRIAL RATE: 102 BPM
ATRIAL RATE: 103 BPM
B-OH-BUTYR SERPL-MCNC: 0.36 MMOL/L (ref 0.02–0.27)
BASE EX.OXY STD BLDV CALC-SCNC: 91.3 % (ref 60–80)
BASE EXCESS BLDV CALC-SCNC: 0 MMOL/L
BASOPHILS # BLD AUTO: 0.06 THOUSANDS/ÂΜL (ref 0–0.1)
BASOPHILS NFR BLD AUTO: 1 % (ref 0–1)
BILIRUB SERPL-MCNC: 0.48 MG/DL (ref 0.2–1)
BUN SERPL-MCNC: 25 MG/DL (ref 5–25)
CALCIUM ALBUM COR SERPL-MCNC: 9.7 MG/DL (ref 8.3–10.1)
CALCIUM SERPL-MCNC: 8.9 MG/DL (ref 8.4–10.2)
CARDIAC TROPONIN I PNL SERPL HS: 3 NG/L
CHLORIDE SERPL-SCNC: 93 MMOL/L (ref 96–108)
CO2 SERPL-SCNC: 25 MMOL/L (ref 21–32)
CREAT SERPL-MCNC: 0.93 MG/DL (ref 0.6–1.3)
CREAT UR-MCNC: 37.7 MG/DL
EOSINOPHIL # BLD AUTO: 0.07 THOUSAND/ÂΜL (ref 0–0.61)
EOSINOPHIL NFR BLD AUTO: 1 % (ref 0–6)
ERYTHROCYTE [DISTWIDTH] IN BLOOD BY AUTOMATED COUNT: 13.1 % (ref 11.6–15.1)
EST. AVERAGE GLUCOSE BLD GHB EST-MCNC: 410 MG/DL
FLUAV RNA RESP QL NAA+PROBE: NEGATIVE
FLUBV RNA RESP QL NAA+PROBE: NEGATIVE
GFR SERPL CREATININE-BSD FRML MDRD: 80 ML/MIN/1.73SQ M
GLUCOSE SERPL-MCNC: 100 MG/DL (ref 65–140)
GLUCOSE SERPL-MCNC: 247 MG/DL (ref 65–140)
GLUCOSE SERPL-MCNC: 299 MG/DL (ref 65–140)
GLUCOSE SERPL-MCNC: 423 MG/DL (ref 65–140)
HBA1C MFR BLD: 15.9 %
HCO3 BLDV-SCNC: 22.8 MMOL/L (ref 24–30)
HCT VFR BLD AUTO: 39.8 % (ref 36.5–49.3)
HGB BLD-MCNC: 14 G/DL (ref 12–17)
IMM GRANULOCYTES # BLD AUTO: 0.18 THOUSAND/UL (ref 0–0.2)
IMM GRANULOCYTES NFR BLD AUTO: 2 % (ref 0–2)
L PNEUMO1 AG UR QL IA.RAPID: NEGATIVE
LACTATE SERPL-SCNC: 0.9 MMOL/L (ref 0.5–2)
LYMPHOCYTES # BLD AUTO: 1.03 THOUSANDS/ÂΜL (ref 0.6–4.47)
LYMPHOCYTES NFR BLD AUTO: 11 % (ref 14–44)
MAGNESIUM SERPL-MCNC: 1.7 MG/DL (ref 1.9–2.7)
MCH RBC QN AUTO: 32.6 PG (ref 26.8–34.3)
MCHC RBC AUTO-ENTMCNC: 35.2 G/DL (ref 31.4–37.4)
MCV RBC AUTO: 93 FL (ref 82–98)
MONOCYTES # BLD AUTO: 0.8 THOUSAND/ÂΜL (ref 0.17–1.22)
MONOCYTES NFR BLD AUTO: 8 % (ref 4–12)
NEUTROPHILS # BLD AUTO: 7.47 THOUSANDS/ÂΜL (ref 1.85–7.62)
NEUTS SEG NFR BLD AUTO: 77 % (ref 43–75)
NRBC BLD AUTO-RTO: 0 /100 WBCS
O2 CT BLDV-SCNC: 18.1 ML/DL
OSMOLALITY UR/SERPL-RTO: 294 MMOL/KG (ref 282–298)
OSMOLALITY UR: 655 MMOL/KG
P AXIS: 64 DEGREES
P AXIS: 66 DEGREES
PCO2 BLDV: 32.1 MM HG (ref 42–50)
PH BLDV: 7.47 [PH] (ref 7.3–7.4)
PLATELET # BLD AUTO: 309 THOUSANDS/UL (ref 149–390)
PMV BLD AUTO: 9.3 FL (ref 8.9–12.7)
PO2 BLDV: 64.2 MM HG (ref 35–45)
POTASSIUM SERPL-SCNC: 4.3 MMOL/L (ref 3.5–5.3)
PR INTERVAL: 190 MS
PR INTERVAL: 196 MS
PROCALCITONIN SERPL-MCNC: 0.72 NG/ML
PROT SERPL-MCNC: 6.4 G/DL (ref 6.4–8.4)
QRS AXIS: 47 DEGREES
QRS AXIS: 54 DEGREES
QRSD INTERVAL: 76 MS
QRSD INTERVAL: 76 MS
QT INTERVAL: 330 MS
QT INTERVAL: 330 MS
QTC INTERVAL: 430 MS
QTC INTERVAL: 432 MS
RBC # BLD AUTO: 4.3 MILLION/UL (ref 3.88–5.62)
RSV RNA RESP QL NAA+PROBE: NEGATIVE
S PNEUM AG UR QL: NEGATIVE
SARS-COV-2 RNA RESP QL NAA+PROBE: NEGATIVE
SODIUM 24H UR-SCNC: 24 MOL/L
SODIUM SERPL-SCNC: 127 MMOL/L (ref 135–147)
T WAVE AXIS: 72 DEGREES
T WAVE AXIS: 76 DEGREES
T4 FREE SERPL-MCNC: <0.25 NG/DL (ref 0.61–1.12)
T4 FREE SERPL-MCNC: <0.25 NG/DL (ref 0.61–1.12)
TSH SERPL DL<=0.05 MIU/L-ACNC: 29.95 UIU/ML (ref 0.45–4.5)
TSH SERPL DL<=0.05 MIU/L-ACNC: 41.23 UIU/ML (ref 0.45–4.5)
VENTRICULAR RATE: 102 BPM
VENTRICULAR RATE: 103 BPM
WBC # BLD AUTO: 9.61 THOUSAND/UL (ref 4.31–10.16)

## 2024-09-01 PROCEDURE — 71045 X-RAY EXAM CHEST 1 VIEW: CPT

## 2024-09-01 PROCEDURE — 96360 HYDRATION IV INFUSION INIT: CPT

## 2024-09-01 PROCEDURE — 83735 ASSAY OF MAGNESIUM: CPT | Performed by: EMERGENCY MEDICINE

## 2024-09-01 PROCEDURE — 84439 ASSAY OF FREE THYROXINE: CPT | Performed by: EMERGENCY MEDICINE

## 2024-09-01 PROCEDURE — 84443 ASSAY THYROID STIM HORMONE: CPT | Performed by: EMERGENCY MEDICINE

## 2024-09-01 PROCEDURE — 82570 ASSAY OF URINE CREATININE: CPT

## 2024-09-01 PROCEDURE — 84145 PROCALCITONIN (PCT): CPT | Performed by: EMERGENCY MEDICINE

## 2024-09-01 PROCEDURE — 85025 COMPLETE CBC W/AUTO DIFF WBC: CPT | Performed by: EMERGENCY MEDICINE

## 2024-09-01 PROCEDURE — 83930 ASSAY OF BLOOD OSMOLALITY: CPT

## 2024-09-01 PROCEDURE — 99285 EMERGENCY DEPT VISIT HI MDM: CPT | Performed by: EMERGENCY MEDICINE

## 2024-09-01 PROCEDURE — 80053 COMPREHEN METABOLIC PANEL: CPT | Performed by: EMERGENCY MEDICINE

## 2024-09-01 PROCEDURE — 82010 KETONE BODYS QUAN: CPT

## 2024-09-01 PROCEDURE — 84484 ASSAY OF TROPONIN QUANT: CPT | Performed by: EMERGENCY MEDICINE

## 2024-09-01 PROCEDURE — 83036 HEMOGLOBIN GLYCOSYLATED A1C: CPT | Performed by: INTERNAL MEDICINE

## 2024-09-01 PROCEDURE — 83935 ASSAY OF URINE OSMOLALITY: CPT

## 2024-09-01 PROCEDURE — 83605 ASSAY OF LACTIC ACID: CPT | Performed by: EMERGENCY MEDICINE

## 2024-09-01 PROCEDURE — 87449 NOS EACH ORGANISM AG IA: CPT

## 2024-09-01 PROCEDURE — 93010 ELECTROCARDIOGRAM REPORT: CPT | Performed by: STUDENT IN AN ORGANIZED HEALTH CARE EDUCATION/TRAINING PROGRAM

## 2024-09-01 PROCEDURE — 84443 ASSAY THYROID STIM HORMONE: CPT

## 2024-09-01 PROCEDURE — 84300 ASSAY OF URINE SODIUM: CPT

## 2024-09-01 PROCEDURE — 82805 BLOOD GASES W/O2 SATURATION: CPT | Performed by: EMERGENCY MEDICINE

## 2024-09-01 PROCEDURE — 71260 CT THORAX DX C+: CPT

## 2024-09-01 PROCEDURE — 84439 ASSAY OF FREE THYROXINE: CPT

## 2024-09-01 PROCEDURE — 99236 HOSP IP/OBS SAME DATE HI 85: CPT | Performed by: INTERNAL MEDICINE

## 2024-09-01 PROCEDURE — 36415 COLL VENOUS BLD VENIPUNCTURE: CPT | Performed by: EMERGENCY MEDICINE

## 2024-09-01 PROCEDURE — NC001 PR NO CHARGE: Performed by: INTERNAL MEDICINE

## 2024-09-01 PROCEDURE — 82948 REAGENT STRIP/BLOOD GLUCOSE: CPT

## 2024-09-01 PROCEDURE — 87040 BLOOD CULTURE FOR BACTERIA: CPT | Performed by: EMERGENCY MEDICINE

## 2024-09-01 PROCEDURE — 0241U HB NFCT DS VIR RESP RNA 4 TRGT: CPT | Performed by: EMERGENCY MEDICINE

## 2024-09-01 PROCEDURE — 74177 CT ABD & PELVIS W/CONTRAST: CPT

## 2024-09-01 PROCEDURE — 99406 BEHAV CHNG SMOKING 3-10 MIN: CPT | Performed by: INTERNAL MEDICINE

## 2024-09-01 RX ORDER — INSULIN LISPRO 100 [IU]/ML
10 INJECTION, SOLUTION INTRAVENOUS; SUBCUTANEOUS
Status: DISCONTINUED | OUTPATIENT
Start: 2024-09-01 | End: 2024-09-01

## 2024-09-01 RX ORDER — PYRIDOSTIGMINE BROMIDE 60 MG/1
60 TABLET ORAL 4 TIMES DAILY
Status: DISCONTINUED | OUTPATIENT
Start: 2024-09-01 | End: 2024-09-01 | Stop reason: HOSPADM

## 2024-09-01 RX ORDER — NICOTINE 21 MG/24HR
1 PATCH, TRANSDERMAL 24 HOURS TRANSDERMAL DAILY
Status: DISCONTINUED | OUTPATIENT
Start: 2024-09-01 | End: 2024-09-01 | Stop reason: HOSPADM

## 2024-09-01 RX ORDER — PANTOPRAZOLE SODIUM 40 MG/1
40 TABLET, DELAYED RELEASE ORAL
Status: DISCONTINUED | OUTPATIENT
Start: 2024-09-01 | End: 2024-09-01 | Stop reason: HOSPADM

## 2024-09-01 RX ORDER — CEFDINIR 300 MG/1
300 CAPSULE ORAL EVERY 12 HOURS SCHEDULED
Qty: 10 CAPSULE | Refills: 0 | Status: SHIPPED | OUTPATIENT
Start: 2024-09-01 | End: 2024-09-06

## 2024-09-01 RX ORDER — CALCIUM CARBONATE 500 MG/1
1000 TABLET, CHEWABLE ORAL DAILY PRN
Status: DISCONTINUED | OUTPATIENT
Start: 2024-09-01 | End: 2024-09-01 | Stop reason: HOSPADM

## 2024-09-01 RX ORDER — SODIUM CHLORIDE, SODIUM GLUCONATE, SODIUM ACETATE, POTASSIUM CHLORIDE, MAGNESIUM CHLORIDE, SODIUM PHOSPHATE, DIBASIC, AND POTASSIUM PHOSPHATE .53; .5; .37; .037; .03; .012; .00082 G/100ML; G/100ML; G/100ML; G/100ML; G/100ML; G/100ML; G/100ML
100 INJECTION, SOLUTION INTRAVENOUS CONTINUOUS
Status: DISCONTINUED | OUTPATIENT
Start: 2024-09-01 | End: 2024-09-01 | Stop reason: HOSPADM

## 2024-09-01 RX ORDER — INSULIN GLARGINE 100 [IU]/ML
40 INJECTION, SOLUTION SUBCUTANEOUS
Status: DISCONTINUED | OUTPATIENT
Start: 2024-09-01 | End: 2024-09-01

## 2024-09-01 RX ORDER — VANCOMYCIN HYDROCHLORIDE 750 MG/150ML
15 INJECTION, SOLUTION INTRAVENOUS ONCE
Status: COMPLETED | OUTPATIENT
Start: 2024-09-01 | End: 2024-09-01

## 2024-09-01 RX ORDER — INSULIN LISPRO 100 [IU]/ML
1-5 INJECTION, SOLUTION INTRAVENOUS; SUBCUTANEOUS
Status: DISCONTINUED | OUTPATIENT
Start: 2024-09-01 | End: 2024-09-01 | Stop reason: HOSPADM

## 2024-09-01 RX ORDER — BENZONATATE 100 MG/1
100 CAPSULE ORAL 3 TIMES DAILY PRN
Status: DISCONTINUED | OUTPATIENT
Start: 2024-09-01 | End: 2024-09-01 | Stop reason: HOSPADM

## 2024-09-01 RX ORDER — INSULIN GLARGINE 100 [IU]/ML
20 INJECTION, SOLUTION SUBCUTANEOUS
Status: DISCONTINUED | OUTPATIENT
Start: 2024-09-01 | End: 2024-09-01 | Stop reason: HOSPADM

## 2024-09-01 RX ORDER — SODIUM CHLORIDE, SODIUM GLUCONATE, SODIUM ACETATE, POTASSIUM CHLORIDE, MAGNESIUM CHLORIDE, SODIUM PHOSPHATE, DIBASIC, AND POTASSIUM PHOSPHATE .53; .5; .37; .037; .03; .012; .00082 G/100ML; G/100ML; G/100ML; G/100ML; G/100ML; G/100ML; G/100ML
1000 INJECTION, SOLUTION INTRAVENOUS ONCE
Status: COMPLETED | OUTPATIENT
Start: 2024-09-01 | End: 2024-09-01

## 2024-09-01 RX ORDER — CARBIDOPA AND LEVODOPA 25; 100 MG/1; MG/1
1 TABLET ORAL 3 TIMES DAILY
Status: DISCONTINUED | OUTPATIENT
Start: 2024-09-01 | End: 2024-09-01

## 2024-09-01 RX ORDER — LEVOTHYROXINE SODIUM 112 UG/1
112 TABLET ORAL 2 TIMES DAILY
Status: DISCONTINUED | OUTPATIENT
Start: 2024-09-01 | End: 2024-09-01 | Stop reason: HOSPADM

## 2024-09-01 RX ORDER — INSULIN LISPRO 100 [IU]/ML
10 INJECTION, SOLUTION INTRAVENOUS; SUBCUTANEOUS ONCE
Status: COMPLETED | OUTPATIENT
Start: 2024-09-01 | End: 2024-09-01

## 2024-09-01 RX ORDER — DOCUSATE SODIUM 100 MG/1
100 CAPSULE, LIQUID FILLED ORAL 2 TIMES DAILY
Status: DISCONTINUED | OUTPATIENT
Start: 2024-09-01 | End: 2024-09-01 | Stop reason: HOSPADM

## 2024-09-01 RX ORDER — INSULIN LISPRO 100 [IU]/ML
8 INJECTION, SOLUTION INTRAVENOUS; SUBCUTANEOUS ONCE
Status: COMPLETED | OUTPATIENT
Start: 2024-09-01 | End: 2024-09-01

## 2024-09-01 RX ORDER — PRAVASTATIN SODIUM 40 MG
40 TABLET ORAL
Status: DISCONTINUED | OUTPATIENT
Start: 2024-09-01 | End: 2024-09-01 | Stop reason: HOSPADM

## 2024-09-01 RX ORDER — MAGNESIUM SULFATE HEPTAHYDRATE 40 MG/ML
2 INJECTION, SOLUTION INTRAVENOUS ONCE
Status: COMPLETED | OUTPATIENT
Start: 2024-09-01 | End: 2024-09-01

## 2024-09-01 RX ORDER — ONDANSETRON 2 MG/ML
4 INJECTION INTRAMUSCULAR; INTRAVENOUS EVERY 6 HOURS PRN
Status: DISCONTINUED | OUTPATIENT
Start: 2024-09-01 | End: 2024-09-01 | Stop reason: HOSPADM

## 2024-09-01 RX ORDER — INSULIN LISPRO 100 [IU]/ML
5 INJECTION, SOLUTION INTRAVENOUS; SUBCUTANEOUS
Status: DISCONTINUED | OUTPATIENT
Start: 2024-09-01 | End: 2024-09-01 | Stop reason: HOSPADM

## 2024-09-01 RX ORDER — GUAIFENESIN 600 MG/1
1200 TABLET, EXTENDED RELEASE ORAL EVERY 12 HOURS SCHEDULED
Status: DISCONTINUED | OUTPATIENT
Start: 2024-09-01 | End: 2024-09-01 | Stop reason: HOSPADM

## 2024-09-01 RX ORDER — ACETAMINOPHEN 325 MG/1
650 TABLET ORAL EVERY 6 HOURS PRN
Status: DISCONTINUED | OUTPATIENT
Start: 2024-09-01 | End: 2024-09-01 | Stop reason: HOSPADM

## 2024-09-01 RX ORDER — INSULIN LISPRO 100 [IU]/ML
10 INJECTION, SOLUTION INTRAVENOUS; SUBCUTANEOUS ONCE
Status: DISCONTINUED | OUTPATIENT
Start: 2024-09-01 | End: 2024-09-01

## 2024-09-01 RX ORDER — OXYBUTYNIN CHLORIDE 5 MG/1
5 TABLET ORAL
COMMUNITY
Start: 2024-06-12

## 2024-09-01 RX ORDER — LANOLIN ALCOHOL/MO/W.PET/CERES
800 CREAM (GRAM) TOPICAL ONCE
Status: COMPLETED | OUTPATIENT
Start: 2024-09-01 | End: 2024-09-01

## 2024-09-01 RX ADMIN — INSULIN LISPRO 8 UNITS: 100 INJECTION, SOLUTION INTRAVENOUS; SUBCUTANEOUS at 04:41

## 2024-09-01 RX ADMIN — PYRIDOSTIGMINE BROMIDE 60 MG: 60 TABLET ORAL at 17:53

## 2024-09-01 RX ADMIN — MAGNESIUM SULFATE HEPTAHYDRATE 2 G: 40 INJECTION, SOLUTION INTRAVENOUS at 08:31

## 2024-09-01 RX ADMIN — LEVOTHYROXINE SODIUM 112 MCG: 112 TABLET ORAL at 08:32

## 2024-09-01 RX ADMIN — NICOTINE 1 PATCH: 21 PATCH, EXTENDED RELEASE TRANSDERMAL at 08:31

## 2024-09-01 RX ADMIN — PANTOPRAZOLE SODIUM 40 MG: 40 TABLET, DELAYED RELEASE ORAL at 06:03

## 2024-09-01 RX ADMIN — GUAIFENESIN 1200 MG: 600 TABLET ORAL at 08:32

## 2024-09-01 RX ADMIN — PYRIDOSTIGMINE BROMIDE 60 MG: 60 TABLET ORAL at 08:32

## 2024-09-01 RX ADMIN — PRAVASTATIN SODIUM 40 MG: 40 TABLET ORAL at 17:53

## 2024-09-01 RX ADMIN — IOHEXOL 100 ML: 350 INJECTION, SOLUTION INTRAVENOUS at 01:59

## 2024-09-01 RX ADMIN — SODIUM CHLORIDE 1000 ML: 0.9 INJECTION, SOLUTION INTRAVENOUS at 02:09

## 2024-09-01 RX ADMIN — INSULIN LISPRO 5 UNITS: 100 INJECTION, SOLUTION INTRAVENOUS; SUBCUTANEOUS at 11:11

## 2024-09-01 RX ADMIN — INSULIN LISPRO 2 UNITS: 100 INJECTION, SOLUTION INTRAVENOUS; SUBCUTANEOUS at 11:10

## 2024-09-01 RX ADMIN — INSULIN LISPRO 5 UNITS: 100 INJECTION, SOLUTION INTRAVENOUS; SUBCUTANEOUS at 08:33

## 2024-09-01 RX ADMIN — LEVOTHYROXINE SODIUM 112 MCG: 112 TABLET ORAL at 17:53

## 2024-09-01 RX ADMIN — INSULIN LISPRO 2 UNITS: 100 INJECTION, SOLUTION INTRAVENOUS; SUBCUTANEOUS at 08:32

## 2024-09-01 RX ADMIN — VANCOMYCIN HYDROCHLORIDE 750 MG: 750 INJECTION, SOLUTION INTRAVENOUS at 03:24

## 2024-09-01 RX ADMIN — SODIUM CHLORIDE, SODIUM GLUCONATE, SODIUM ACETATE, POTASSIUM CHLORIDE, MAGNESIUM CHLORIDE, SODIUM PHOSPHATE, DIBASIC, AND POTASSIUM PHOSPHATE 1000 ML: .53; .5; .37; .037; .03; .012; .00082 INJECTION, SOLUTION INTRAVENOUS at 11:09

## 2024-09-01 RX ADMIN — CEFTRIAXONE SODIUM 1000 MG: 10 INJECTION, POWDER, FOR SOLUTION INTRAVENOUS at 04:33

## 2024-09-01 RX ADMIN — INSULIN LISPRO 10 UNITS: 100 INJECTION, SOLUTION INTRAVENOUS; SUBCUTANEOUS at 08:33

## 2024-09-01 RX ADMIN — RIVAROXABAN 20 MG: 20 TABLET, FILM COATED ORAL at 08:32

## 2024-09-01 RX ADMIN — SODIUM CHLORIDE, SODIUM GLUCONATE, SODIUM ACETATE, POTASSIUM CHLORIDE, MAGNESIUM CHLORIDE, SODIUM PHOSPHATE, DIBASIC, AND POTASSIUM PHOSPHATE 1000 ML: .53; .5; .37; .037; .03; .012; .00082 INJECTION, SOLUTION INTRAVENOUS at 08:48

## 2024-09-01 RX ADMIN — Medication 800 MG: at 03:26

## 2024-09-01 RX ADMIN — PYRIDOSTIGMINE BROMIDE 60 MG: 60 TABLET ORAL at 12:21

## 2024-09-01 RX ADMIN — NICOTINE POLACRILEX 2 MG: 2 GUM, CHEWING BUCCAL at 12:21

## 2024-09-01 RX ADMIN — SODIUM CHLORIDE, SODIUM GLUCONATE, SODIUM ACETATE, POTASSIUM CHLORIDE, MAGNESIUM CHLORIDE, SODIUM PHOSPHATE, DIBASIC, AND POTASSIUM PHOSPHATE 100 ML/HR: .53; .5; .37; .037; .03; .012; .00082 INJECTION, SOLUTION INTRAVENOUS at 12:23

## 2024-09-01 NOTE — ASSESSMENT & PLAN NOTE
Patient reports one week of generalized weakness and fatigue. Denies cough, shortness of breath, chest pain  CT reveals RUL pneumonia with possible focal area of cavitation,emphysema, mediastinal/right hilar lymphadenopathy   Flu/RSV/COVID negative  Urine strep and legionella pending  Initially given Rocephin, Vancomycin in the emergency department, will continue for now and monitor cultures and titrate therapy as indicated  Procalcitonin 0.72; continue to trend  WBC 9.61; continue to trend   Blood Cultures pending  Sputum culture ordered  Supplemental oxygen for saturations > 90%; wean as tolerated  Aspiration Precautions  Respiratory Protocol   Incentive Spirometry   Supportive Care

## 2024-09-01 NOTE — H&P
[facility]  H&P  Name: Kalpesh Haro 73 y.o. male I MRN: [id number]  Unit/Bed#: -01 I Date of Admission: 8/31/2024   Date of Service: 9/1/2024 I Hospital Day: 0      Assessment & Plan   * Pneumonia  Assessment & Plan  Patient reports one week of generalized weakness and fatigue. Denies cough, shortness of breath, chest pain  CT reveals RUL pneumonia with possible focal area of cavitation,emphysema, mediastinal/right hilar lymphadenopathy   Flu/RSV/COVID negative  Urine strep and legionella pending  Initially given Rocephin, Vancomycin in the emergency department, will continue for now and monitor cultures and titrate therapy as indicated  Procalcitonin 0.72; continue to trend  WBC 9.61; continue to trend   Blood Cultures pending  Sputum culture ordered  Supplemental oxygen for saturations > 90%; wean as tolerated  Aspiration Precautions  Respiratory Protocol   Incentive Spirometry   Supportive Care    Tobacco abuse  Assessment & Plan  Current every day smoker  Tobacco cessation counseling provided for > 3 min  Patient declining NRT    Myasthenia gravis (HCC)  Assessment & Plan  Stable  Continue pyridostigmine     Hypothyroidism  Assessment & Plan  TSH 41.234 on admission, will repeat  Patient unclear on medication compliance or dosing  Will continues levothyroxine 112 BID for now  Endocrinology consult, appreciate input    Personal history of DVT and PE  Assessment & Plan  Hx of DVT per chart review,  Continue Xarelto    Hyponatremia  Assessment & Plan  Lab Results   Component Value Date    SODIUM 127 (L) 09/01/2024    SODIUM 131 (L) 08/16/2023    SODIUM 129 (L) 08/15/2023     Corrected Na for hyperglycemia 132  Patient endorses poor oral intake  Urine sodium, urine creatinine, urine osmo, and serum osmo ordered  TSH 41.234  Monitor BMP    Diabetes mellitus (HCC)  Assessment & Plan  Lab Results   Component Value Date    HGBA1C 12.8 (H) 08/14/2023       Recent Labs     08/31/24  9199    POCGLU 423*       Blood Sugar Average: Last 72 hrs:  (P) 423    Poorly controlled blood sugar, patient unclear on medication compliance or current dosing  Blood sugar 423, not meeting DKA, criteria  Lantus 20u at night, 5-5-5 meal coverage, SSI coverage, goal 140-180  POC glucose checks AC/HS  Hypoglycemia protocol  Carb controlled diet         VTE Pharmacologic Prophylaxis: VTE Score: 8 High Risk (Score >/= 5) - Pharmacological DVT Prophylaxis Ordered: rivaroxaban (Xarelto). Sequential Compression Devices Ordered.  Code Status: Level 1 - Full Code   Discussion with family: Patient declined call to .     Anticipated Length of Stay: Patient will be admitted on an inpatient basis with an anticipated length of stay of greater than 2 midnights secondary to pneumonia.    Total Time Spent on Date of Encounter in care of patient: 79 mins. This time was spent on one or more of the following: performing physical exam; counseling and coordination of care; obtaining or reviewing history; documenting in the medical record; reviewing/ordering tests, medications or procedures; communicating with other healthcare professionals and discussing with patient's family/caregivers.    Chief Complaint:    Chief Complaint   Patient presents with    Weakness - Generalized     Pt states feeling very weak for a few days, with difficulty ambulating. Has not taken medications for diabetes, thyroid, MG, parkinson's since about April. Denies sob/chest pain          History of Present Illness:  Kalpesh Haro is a 73 y.o. male with a PMH of Hypothyroidism, T2DM, Hx of DVT and tobacco abuse who presents with generalized weakness.  Patient is a poor historian and is unclear on symptoms and current medications.  He endorses progressive weakness for over a week and today he was so weak he was unable to walk.  He denies productive cough, recent illness, fever, chills.  He denies any recent sick contacts.  He states he lives only with 1  roommate.  CT scan in ED concerning for right upper lobe pneumonia.  Also blood sugar elevated at over 400 and TSH at 41. Will admit inpatient for further evaluation.     Review of Systems:  Review of Systems   Constitutional:  Positive for fatigue.   Neurological:  Positive for weakness.       Past Medical and Surgical History:   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)     Prostate cancer (HCC)     Thyroid cancer (HCC)        Past Surgical History:   Procedure Laterality Date    THYROIDECTOMY         Meds/Allergies:  Prior to Admission medications    Medication Sig Start Date End Date Taking? Authorizing Provider   glipiZIDE (GLUCOTROL XL) 5 mg 24 hr tablet Take 5 mg by mouth   Yes Historical Provider, MD   levothyroxine 112 mcg tablet Take 112 mcg by mouth 2 (two) times a day   Yes Historical Provider, MD   pyridostigmine (MESTINON) 60 mg tablet Take 60 mg by mouth 4 (four) times a day    Yes Historical Provider, MD   acetaminophen (TYLENOL) 325 mg tablet Take 325 mg by mouth continuous as needed    Historical Provider, MD   Atropine Sulfate 0.25 MG/5ML SOSY Take 0.25 mg by mouth 3 (three) times a day as needed    Historical Provider, MD   carbidopa-levodopa (SINEMET)  mg per tablet Take 1 tablet by mouth 3 (three) times a day  Patient not taking: Reported on 9/1/2024 8/20/21   Maximo Balbuena MD   Cholecalciferol (VITAMIN D3) 50 MCG (2000 UT) capsule Take 2,000 Units by mouth    Historical Provider, MD   cholestyramine (QUESTRAN) 4 g packet Take 1 packet (4 g total) by mouth 2 (two) times a day with meals 6/10/20   Camden Oneil DO   Cyanocobalamin 1000 MCG SUBL Place 1,000 mcg under the tongue daily 4 times a week 6/9/11   Historical Provider, MD   folic acid (FOLVITE) 1 mg tablet Take 1 mg by mouth 2 (two) times a day 6/9/11   Historical Provider, MD   Insulin Aspart (NovoLOG) 100  units/mL injection Inject 14 Units under the skin 3 (three) times a day with meals    Historical Provider, MD   insulin glargine (LANTUS) 100 units/mL subcutaneous injection Inject 40 Units under the skin daily at bedtime    Historical Provider, MD   oxybutynin (DITROPAN) 5 mg tablet Take 5 mg by mouth  Patient not taking: Reported on 9/1/2024 6/12/24   Historical Provider, MD   pantoprazole (PROTONIX) 40 mg tablet Take 1 tablet (40 mg total) by mouth daily in the early morning  Patient not taking: Reported on 9/1/2024 5/31/20   Jennifer Carson MD   rivaroxaban (XARELTO) 20 mg tablet Take 20 mg by mouth  Patient not taking: Reported on 9/1/2024    Historical Provider, MD   saxagliptin (ONGLYZA) 5 MG tablet Take 5 mg by mouth daily  Patient not taking: Reported on 9/1/2024    Historical Provider, MD   sildenafil (VIAGRA) 100 mg tablet Take 100 mg by mouth as needed   Patient not taking: Reported on 9/1/2024    Historical Provider, MD   simvastatin (ZOCOR) 20 mg tablet Take 10 mg by mouth daily Ordered as 20mg pt takes half a tab  Patient not taking: Reported on 9/1/2024    Historical Provider, MD     I have reviewed home medications with patient personally.    Allergies:   Allergies   Allergen Reactions    Cyclobenzaprine Other (See Comments)    Metformin Diarrhea    Other GI Intolerance    Oxycodone Itching    Apixaban Delirium       Social History:  Marital Status: Single   Patient Pre-hospital Living Situation: Home, With other family member: with roommate  Patient Pre-hospital Level of Mobility: unable to be assessed at time of evaluation  Substance Use History:   Social History     Substance and Sexual Activity   Alcohol Use Not Currently     Social History     Tobacco Use   Smoking Status Every Day    Current packs/day: 0.50    Average packs/day: 0.5 packs/day for 61.0 years (30.5 ttl pk-yrs)    Types: Cigarettes   Smokeless Tobacco Never     Social History     Substance and Sexual Activity   Drug Use Not  "Currently       Family History:  Family History   Family history unknown: Yes       Physical Exam:     Vitals:   Blood Pressure: 106/65 (09/01/24 0407)  Pulse: 89 (09/01/24 0407)  Temperature: 99.6 °F (37.6 °C) (09/01/24 0407)  Temp Source: Rectal (09/01/24 0130)  Respirations: 20 (09/01/24 0230)  Height: 5' 6\" (167.6 cm) (09/01/24 0407)  Weight - Scale: 66.4 kg (146 lb 6.2 oz) (09/01/24 0407)  SpO2: 92 % (09/01/24 0407)    Physical Exam  Constitutional:       Appearance: He is ill-appearing.   Cardiovascular:      Rate and Rhythm: Regular rhythm. Tachycardia present.      Pulses: Normal pulses.      Heart sounds: Normal heart sounds.   Pulmonary:      Effort: No respiratory distress.      Breath sounds: Normal breath sounds.   Musculoskeletal:         General: No swelling.   Neurological:      Mental Status: He is alert and oriented to person, place, and time.   Psychiatric:         Behavior: Behavior is uncooperative and agitated.          Additional Data:     Lab Results:  Results from last 7 days   Lab Units 09/01/24  0003   WBC Thousand/uL 9.61   HEMOGLOBIN g/dL 14.0   HEMATOCRIT % 39.8   PLATELETS Thousands/uL 309   SEGS PCT % 77*   LYMPHO PCT % 11*   MONO PCT % 8   EOS PCT % 1     Results from last 7 days   Lab Units 09/01/24  0003   SODIUM mmol/L 127*   POTASSIUM mmol/L 4.3   CHLORIDE mmol/L 93*   CO2 mmol/L 25   BUN mg/dL 25   CREATININE mg/dL 0.93   ANION GAP mmol/L 9   CALCIUM mg/dL 8.9   ALBUMIN g/dL 3.0*   TOTAL BILIRUBIN mg/dL 0.48   ALK PHOS U/L 72   ALT U/L 29   AST U/L 28   GLUCOSE RANDOM mg/dL 423*         Results from last 7 days   Lab Units 08/31/24  2337   POC GLUCOSE mg/dl 423*     Lab Results   Component Value Date    HGBA1C 12.8 (H) 08/14/2023    HGBA1C 8.7 (H) 05/28/2020     Results from last 7 days   Lab Units 09/01/24  0154 09/01/24  0003   LACTIC ACID mmol/L 0.9  --    PROCALCITONIN ng/ml  --  0.72*       Lines/Drains:  Invasive Devices       Peripheral Intravenous Line  Duration          "    Peripheral IV 09/01/24 <1 day                        Imaging: Reviewed radiology reports from this admission including: chest CT scan  CT chest abdomen pelvis w contrast   Final Result by Tomy Wheeler MD (09/01 0241)      1.  Right upper lobe pneumonia. Focal area of possible cavitation. Recommend follow-up CT in 6 to 8 weeks to evaluate for underlying lesion.   2.  Emphysema   3.  Mediastinal/right hilar lymphadenopathy   4.  Ill-defined hepatic hypodensity in the gallbladder fossa, favored to represent focal fatty infiltration, this can also be followed up with the impression 1 finding. Follow-up contrast-enhanced chest and abdomen should be ordered.      The study was marked in EPIC for significant notification.         Workstation performed: [id number]         XR chest 1 view portable   ED Interpretation by Mya Galan MD (09/01 0146)   RUL infiltrate          EKG and Other Studies Reviewed on Admission:   EKG: Sinus Tachycardia. .    ** Please Note: This note has been constructed using a voice recognition system. **

## 2024-09-01 NOTE — ASSESSMENT & PLAN NOTE
Patient reports one week of generalized weakness and fatigue. Denies cough, shortness of breath, chest pain  CT reveals RUL pneumonia with possible focal area of cavitation,emphysema, mediastinal/right hilar lymphadenopathy     Lab Results   Component Value Date    SARSCOV2 Negative 09/01/2024    INFLUA Negative 09/01/2024    INFLUB Negative 09/01/2024    RSV Negative 09/01/2024     Recent Labs     09/01/24  0003   PROCALCITONI 0.72*        Plan:  Follow Urine Strep, Legionella, sputum cultures, Procalcitonin tomorrow a.m.  Encourage smoking Cessation  Ceftriaxone 1 g IV q. 24 hours  Guaifenesin 1200 mg q12 h  Albuterol Nebs 2 puffs q6 h PRN  Incentive spirometry  CBC with Diff Tomorrow AM

## 2024-09-01 NOTE — NURSING NOTE
Patient stated to the primary nurse that we wants to leave the hospital AMA. Patient was informed and educated about the risk of leaving against medical advise. AMA formed was signed and BRANDON addressed the patients AMA request at the bedside. IV removed. Patient was discharged by [facility]. PCA escorted the patient to the outside waiting area. Patient stated that he will be picked up by his roommate.

## 2024-09-01 NOTE — ED NOTES
"Pt and pt family report generalized weakness progressing over \"past month\". Pt family voices that medication regimen has not been taken for \"over a month\". Pt family reports recent hardships, currently living in \"[place] after being kicked out of our home for 11 years\". Pt [facility] however, due to hardship has not been seen in \"about 2 years\". Denies any recent fall/trauma/head strike/LOC. Pt family reports \"a few close calls\". Reports decreased appetite, increased urination, and excessive thirst the \"past week\". Denies PCP contact. Safety parameters maintained, call bell within reach for assistance.     Marianne Walton RN  09/01/24 0033       Marianne Walton RN  09/01/24 0034    "

## 2024-09-01 NOTE — NURSING NOTE
"Pt arrived to unit agitated and cursing at staff. Pt stated \"I will trash this room if I don't get water now.\" Pt does not have diet order currently and educated on importance of waiting until diet order is placed. Admitting provider and charge nurse made aware.  "

## 2024-09-01 NOTE — ED NOTES
Dr. Ferrara made aware of patient laboratory test result and provided with current and prior EKGs. Pt observed resting on stretcher, appropriate safety parameters maintained. Call bell within reach for assistance.     Marianne Walton RN  09/01/24 0057

## 2024-09-01 NOTE — ASSESSMENT & PLAN NOTE
Lab Results   Component Value Date    SODIUM 127 (L) 09/01/2024    SODIUM 131 (L) 08/16/2023    SODIUM 129 (L) 08/15/2023     Corrected Na for hyperglycemia 132  Patient endorses poor oral intake  Urine sodium, urine creatinine, urine osmo, and serum osmo ordered  TSH 41.234  Monitor BMP

## 2024-09-01 NOTE — ED PROVIDER NOTES
History  Chief Complaint   Patient presents with    Weakness - Generalized     Pt states feeling very weak for a few days, with difficulty ambulating. Has not taken medications for diabetes, thyroid, MG, parkinson's since about April. Denies sob/chest pain      HPI    Prior to Admission Medications   Prescriptions Last Dose Informant Patient Reported? Taking?   Atropine Sulfate 0.25 MG/5ML SOSY Unknown Self Yes No   Sig: Take 0.25 mg by mouth 3 (three) times a day as needed   Cholecalciferol (VITAMIN D3) 50 MCG (2000 UT) capsule Unknown Self Yes No   Sig: Take 2,000 Units by mouth   Cyanocobalamin 1000 MCG SUBL Unknown Self Yes No   Sig: Place 1,000 mcg under the tongue daily 4 times a week   Insulin Aspart (NovoLOG) 100 units/mL injection Unknown  Yes No   Sig: Inject 14 Units under the skin 3 (three) times a day with meals   acetaminophen (TYLENOL) 325 mg tablet Unknown Self Yes No   Sig: Take 325 mg by mouth continuous as needed   carbidopa-levodopa (SINEMET)  mg per tablet Unknown  No No   Sig: Take 1 tablet by mouth 3 (three) times a day   Patient not taking: Reported on 9/1/2024   cholestyramine (QUESTRAN) 4 g packet Unknown Self No No   Sig: Take 1 packet (4 g total) by mouth 2 (two) times a day with meals   folic acid (FOLVITE) 1 mg tablet Unknown Self Yes No   Sig: Take 1 mg by mouth 2 (two) times a day   glipiZIDE (GLUCOTROL XL) 5 mg 24 hr tablet Past Week Self Yes Yes   Sig: Take 5 mg by mouth   insulin glargine (LANTUS) 100 units/mL subcutaneous injection Unknown  Yes No   Sig: Inject 40 Units under the skin daily at bedtime   levothyroxine 112 mcg tablet 8/31/2024 Self Yes Yes   Sig: Take 112 mcg by mouth 2 (two) times a day   oxybutynin (DITROPAN) 5 mg tablet Unknown  Yes No   Sig: Take 5 mg by mouth   Patient not taking: Reported on 9/1/2024   pantoprazole (PROTONIX) 40 mg tablet Unknown Self No No   Sig: Take 1 tablet (40 mg total) by mouth daily in the early morning   Patient not taking:  Reported on 9/1/2024   pyridostigmine (MESTINON) 60 mg tablet Past Week Self Yes Yes   Sig: Take 60 mg by mouth 4 (four) times a day    rivaroxaban (XARELTO) 20 mg tablet Unknown  Yes No   Sig: Take 20 mg by mouth   Patient not taking: Reported on 9/1/2024   saxagliptin (ONGLYZA) 5 MG tablet Unknown Self Yes No   Sig: Take 5 mg by mouth daily   Patient not taking: Reported on 9/1/2024   sildenafil (VIAGRA) 100 mg tablet Unknown Self Yes No   Sig: Take 100 mg by mouth as needed    Patient not taking: Reported on 9/1/2024   simvastatin (ZOCOR) 20 mg tablet Unknown Self Yes No   Sig: Take 10 mg by mouth daily Ordered as 20mg pt takes half a tab   Patient not taking: Reported on 9/1/2024      Facility-Administered Medications: None       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    E-Cigarette/Vaping    E-Cigarette Use Never User      E-Cigarette/Vaping Substances    Nicotine No     THC No     CBD No     Flavoring No     Other No     Unknown No      Social History     Tobacco Use    Smoking status: Every Day     Current packs/day: 0.50     Average packs/day: 0.5 packs/day for 61.0 years (30.5 ttl pk-yrs)     Types: Cigarettes    Smokeless tobacco: Never   Vaping Use    Vaping status: Never Used   Substance Use Topics    Alcohol use: Not Currently    Drug use: Not Currently       Review of Systems    Physical Exam  Physical Exam  Vitals and nursing note reviewed.   Constitutional:       General: He is not in acute distress.     Appearance: He is well-developed. He is not ill-appearing (chronically ill).   HENT:      Head: Normocephalic and atraumatic.      Mouth/Throat:       Mouth: Mucous membranes are moist.   Eyes:      Conjunctiva/sclera: Conjunctivae normal.      Pupils: Pupils are equal, round, and reactive to light.   Neck:      Trachea: No tracheal deviation.   Cardiovascular:      Rate and Rhythm: Regular rhythm. Tachycardia present.      Heart sounds: Normal heart sounds.   Pulmonary:      Effort: Pulmonary effort is normal. No respiratory distress.      Breath sounds: Normal breath sounds.   Abdominal:      General: There is no distension.      Palpations: Abdomen is soft.      Tenderness: There is no abdominal tenderness.   Musculoskeletal:      Cervical back: Normal range of motion.   Skin:     General: Skin is warm and dry.   Neurological:      Mental Status: He is alert and oriented to person, place, and time.      GCS: GCS eye subscore is 4. GCS verbal subscore is 5. GCS motor subscore is 6.      Comments: Weakness in bilateral legs, no focal deficits   Psychiatric:         Mood and Affect: Mood and affect normal.         Behavior: Behavior normal.         Vital Signs  ED Triage Vitals [08/31/24 2335]   Temperature Pulse Respirations Blood Pressure SpO2   99.3 °F (37.4 °C) (!) 111 20 123/58 94 %      Temp Source Heart Rate Source Patient Position - Orthostatic VS BP Location FiO2 (%)   Oral Monitor Sitting Left arm --      Pain Score       No Pain           Vitals:    09/01/24 0130 09/01/24 0215 09/01/24 0230 09/01/24 0407   BP: 100/55 115/57 107/57 106/65   Pulse: 93 93 91 89   Patient Position - Orthostatic VS: Lying Lying Lying          Visual Acuity      ED Medications  Medications   sodium chloride 0.9 % bolus 1,000 mL (0 mL Intravenous Stopped 9/1/24 0309)   iohexol (OMNIPAQUE) 350 MG/ML injection (MULTI-DOSE) 100 mL (100 mL Intravenous Given 9/1/24 0159)   vancomycin (VANCOCIN) IVPB (premix in dextrose) 750 mg 150 mL (0 mg Intravenous Stopped 9/1/24 3182)   ceftriaxone (ROCEPHIN) 1 g/50 mL in dextrose IVPB (0 mg Intravenous Stopped 9/1/24 0030)   magnesium  Oxide (MAG-OX) tablet 800 mg (800 mg Oral Given 9/1/24 0326)       Diagnostic Studies  Results Reviewed       Procedure Component Value Units Date/Time    Blood culture #1 [[id number]] Collected: 09/01/24 0316    Lab Status: In process Specimen: Blood from Arm, Left Updated: 09/01/24 0321    Blood culture #2 [[id number]] Collected: 09/01/24 0316    Lab Status: In process Specimen: Blood from Arm, Right Updated: 09/01/24 0321    FLU/RSV/COVID - if FLU/RSV clinically relevant [[id number]]  (Normal) Collected: 09/01/24 0154    Lab Status: Final result Specimen: Nares from Nose Updated: 09/01/24 0239     SARS-CoV-2 Negative     INFLUENZA A PCR Negative     INFLUENZA B PCR Negative     RSV PCR Negative    Narrative:      This test has been performed using the CoV-2/Flu/RSV plus assay on the [employer] GeneXpert platform. This test has been validated by the  and verified by the performing laboratory.     This test is designed to amplify and detect the following: nucleocapsid (N), envelope (E), and RNA-dependent RNA polymerase (RdRP) genes of the SARS-CoV-2 genome; matrix (M), basic polymerase (PB2), and acidic protein (PA) segments of the influenza A genome; matrix (M) and non-structural protein (NS) segments of the influenza B genome, and the nucleocapsid genes of RSV A and RSV B.     Positive results are indicative of the presence of Flu A, Flu B, RSV, and/or SARS-CoV-2 RNA. Positive results for SARS-CoV-2 or suspected novel influenza should be reported to state, local, or federal health departments according to local reporting requirements.      All results should be assessed in conjunction with clinical presentation and other laboratory markers for clinical management.     FOR PEDIATRIC PATIENTS - copy/paste COVID Guidelines URL to browser: [email or web address]       TSH, 3rd generation with Free T4 reflex [[id number]]  (Abnormal) Collected: 09/01/24 0003     Lab Status: Final result Specimen: Blood from Arm, Right Updated: 09/01/24 0223     TSH 3RD GENERATON 41.234 uIU/mL     Magnesium [[id number]]  (Abnormal) Collected: 09/01/24 0003    Lab Status: Final result Specimen: Blood from Arm, Right Updated: 09/01/24 0223     Magnesium 1.7 mg/dL     T4, free [[id number]] Collected: 09/01/24 0003    Lab Status: In process Specimen: Blood from Arm, Right Updated: 09/01/24 0223    Lactic acid, plasma (w/reflex if result > 2.0) [[id number]]  (Normal) Collected: 09/01/24 0154    Lab Status: Final result Specimen: Blood from Arm, Right Updated: 09/01/24 0222     LACTIC ACID 0.9 mmol/L     Narrative:      Result may be elevated if tourniquet was used during collection.    Procalcitonin [[id number]]  (Abnormal) Collected: 09/01/24 0003    Lab Status: Final result Specimen: Blood from Arm, Right Updated: 09/01/24 0219     Procalcitonin 0.72 ng/ml     Blood gas, venous [[id number]]  (Abnormal) Collected: 09/01/24 0154    Lab Status: Final result Specimen: Blood from Arm, Right Updated: 09/01/24 0202     pH, Ben 7.470     pCO2, Ben 32.1 mm Hg      pO2, Ben 64.2 mm Hg      HCO3, Ben 22.8 mmol/L      Base Excess, Ben 0.0 mmol/L      O2 Content, Ben 18.1 ml/dL      O2 HGB, VENOUS 91.3 %     Comprehensive metabolic panel [[id number]]  (Abnormal) Collected: 09/01/24 0003    Lab Status: Final result Specimen: Blood from Arm, Right Updated: 09/01/24 0050     Sodium 127 mmol/L      Potassium 4.3 mmol/L      Chloride 93 mmol/L      CO2 25 mmol/L      ANION GAP 9 mmol/L      BUN 25 mg/dL      Creatinine 0.93 mg/dL      Glucose 423 mg/dL      Calcium 8.9 mg/dL      Corrected Calcium 9.7 mg/dL      AST 28 U/L      ALT 29 U/L      Alkaline Phosphatase 72 U/L      Total Protein 6.4 g/dL      Albumin 3.0 g/dL      Total Bilirubin 0.48 mg/dL      eGFR 80 ml/min/1.73sq m     Narrative:      National Kid[employer] guidelines for Chronic Kidney Disease (CKD):     Stage 1 with normal or high GFR (GFR  > 90 mL/min/1.73 square meters)    Stage 2 Mild CKD (GFR = 60-89 mL/min/1.73 square meters)    Stage 3A Moderate CKD (GFR = 45-59 mL/min/1.73 square meters)    Stage 3B Moderate CKD (GFR = 30-44 mL/min/1.73 square meters)    Stage 4 Severe CKD (GFR = 15-29 mL/min/1.73 square meters)    Stage 5 End Stage CKD (GFR <15 mL/min/1.73 square meters)  Note: GFR calculation is accurate only with a steady state creatinine    HS Troponin 0hr (reflex protocol) [[id number]]  (Normal) Collected: 09/01/24 0003    Lab Status: Final result Specimen: Blood from Arm, Right Updated: 09/01/24 0043     hs TnI 0hr 3 ng/L     CBC and differential [[id number]]  (Abnormal) Collected: 09/01/24 0003    Lab Status: Final result Specimen: Blood from Arm, Right Updated: 09/01/24 0018     WBC 9.61 Thousand/uL      RBC 4.30 Million/uL      Hemoglobin 14.0 g/dL      Hematocrit 39.8 %      MCV 93 fL      MCH 32.6 pg      MCHC 35.2 g/dL      RDW 13.1 %      MPV 9.3 fL      Platelets 309 Thousands/uL      nRBC 0 /100 WBCs      Segmented % 77 %      Immature Grans % 2 %      Lymphocytes % 11 %      Monocytes % 8 %      Eosinophils Relative 1 %      Basophils Relative 1 %      Absolute Neutrophils 7.47 Thousands/µL      Absolute Immature Grans 0.18 Thousand/uL      Absolute Lymphocytes 1.03 Thousands/µL      Absolute Monocytes 0.80 Thousand/µL      Eosinophils Absolute 0.07 Thousand/µL      Basophils Absolute 0.06 Thousands/µL     Fingerstick Glucose (POCT) [[id number]]  (Abnormal) Collected: 08/31/24 2337    Lab Status: Final result Specimen: Blood Updated: 08/31/24 2339     POC Glucose 423 mg/dl                    CT chest abdomen pelvis w contrast   Final Result by Tomy Wheeler MD (09/01 0241)      1.  Right upper lobe pneumonia. Focal area of possible cavitation. Recommend follow-up CT in 6 to 8 weeks to evaluate for underlying lesion.   2.  Emphysema   3.  Mediastinal/right hilar lymphadenopathy   4.  Ill-defined hepatic hypodensity in the gallbladder  "fossa, favored to represent focal fatty infiltration, this can also be followed up with the impression 1 finding. Follow-up contrast-enhanced chest and abdomen should be ordered.      The study was marked in EPIC for significant notification.         Workstation performed: [id number]         XR chest 1 view portable   ED Interpretation by Mya Galan MD (09/01 0146)   RUL infiltrate                 Procedures  ECG 12 Lead Documentation Only    Date/Time: 9/1/2024 7:28 AM    Performed by: Mya Galan MD  Authorized by: Mya Galan MD    Indications / Diagnosis:  Weakness, tachycardia  ECG reviewed by me, the ED Provider: yes    Patient location:  ED  Previous ECG:     Previous ECG:  Compared to current    Comparison ECG info:  08/14/23    Similarity:  No change  Interpretation:     Interpretation: non-specific    Rate:     ECG rate:  103    ECG rate assessment: tachycardic    Rhythm:     Rhythm: sinus tachycardia    Ectopy:     Ectopy: none    QRS:     QRS axis:  Normal    QRS intervals:  Normal  Conduction:     Conduction: normal    ST segments:     ST segments:  Normal  T waves:     T waves: normal    Other findings:     Other findings: poor R wave progression             ED Course                                               Medical Decision Making  This is a 73-year-old male who presents here today complaining of \"my legs.\"  He says his legs have not been working normally and he has been having difficulties walking around because they feel weak.  He denies any falls as a result of this.  He denies any weakness of his arms.  He has no headache or preceding head trauma.  He has no other complaints.  He is denying fevers or URI symptoms, nausea, vomiting, diarrhea, chest pain, shortness of breath.  The friend who was initially with him told the nurse that he has not taken his medication in some this.  The patient says he has not been taking them but it is " unable to tell me why.    Review of systems: Otherwise negative unless stated as above    He is chronically ill-appearing, no acute distress.  He has generalized weakness of the legs, but no focal deficits.  Exam is otherwise unremarkable.  Differential for weakness includes infection, dehydration, AMY, electrolyte or thyroid abnormality, HHNK, DKA, ACS, dysrhythmia.  Labs and chest x-ray have been ordered by nursing from triage and reviewed by myself.  He does have a right upper lobe infiltrate, within the setting of being a smoker is concerning for possible cancer postobstructive pneumonia versus bacterial infiltrate.  Will get a CT scan of the chest abdomen and pelvis to evaluate for signs of cancer and metastases contributing to weakness.  He is hyperglycemic to 423 with hyponatremia to 127, which corrects to 132.  He has not normal anion gap.  With hyperglycemia we will add on VBG to assess for acidosis, with concerns for possible pneumonia will add on lactate and procalcitonin for further evaluation of severe sepsis or septic shock, though he does not meet SIRS criteria.  We will also check TSH as he has history of hypothyroidism and is supposed to be on levothyroxine, which she has not been taking. Weight is down almost 10 kg since admission last year.     TSH is elevated to 41, with T4 pending.  Lactate is normal and procalcitonin is mildly elevated at 0.72.  VBG does not show acidosis.  CT scan shows right upper lobe pneumonia with focal cavitation, no obvious underlying lesion but could be obscured.  Given cavitation, we will treat him with vancomycin for possible MRSA as this can cause cavitation.  The patient is again denying any specific URI symptoms, any hemoptysis, weight loss, night sweats.  He was in residential overnight but no prolonged time in residential or other risk factors for tuberculosis.  He will require admission for further evaluation and management.  I updated him on findings and plan of care, and he  expresses understanding.    Problems Addressed:  Elevated TSH: acute illness or injury  Generalized weakness: acute illness or injury  History of cigarette smoking: acute illness or injury  History of medication noncompliance: acute illness or injury  Hyperglycemia: acute illness or injury  Hypomagnesemia: acute illness or injury  Hyponatremia: acute illness or injury  Right upper lobe pneumonia: acute illness or injury    Amount and/or Complexity of Data Reviewed  Labs: ordered. Decision-making details documented in ED Course.  Radiology: ordered and independent interpretation performed. Decision-making details documented in ED Course.  ECG/medicine tests: ordered and independent interpretation performed. Decision-making details documented in ED Course.    Risk  OTC drugs.  Prescription drug management.  Decision regarding hospitalization.                 Disposition  Final diagnoses:   Right upper lobe pneumonia   Generalized weakness   Hyperglycemia   Hyponatremia   Elevated TSH   History of cigarette smoking   History of medication noncompliance   Hypomagnesemia     Time reflects when diagnosis was documented in both MDM as applicable and the Disposition within this note       Time User Action Codes Description Comment    9/1/2024  3:07 AM Mya Galan Add [J18.9] Right upper lobe pneumonia     9/1/2024  3:07 AM Mya Galan Add [R53.1] Generalized weakness     9/1/2024  3:07 AM Mya Galan Add [R73.9] Hyperglycemia     9/1/2024  3:07 AM Mya Galan Add [E87.1] Hyponatremia     9/1/2024  3:07 AM Mya Galan Add [R79.89] Elevated TSH     9/1/2024  3:07 AM Mya Galan Add [Z87.891] History of cigarette smoking     9/1/2024  3:08 AM Linda Galanine Add [Z91.148] History of medication noncompliance     9/1/2024  3:08 AM Mya Galan Add [E83.42] Hypomagnesemia     9/1/2024  4:28 AM  Sobia Becerra [E03.9] Hypothyroidism, unspecified type     9/1/2024  4:29 AM Sobia Becerra [E11.69] Type 2 diabetes mellitus with other specified complication, unspecified whether long term insulin use (HCC)           ED Disposition       ED Disposition   Admit    Condition   Stable    Date/Time   Sun Sep 1, 2024  2:55 AM    Comment   Case was discussed with Sobia and the patient's admission status was agreed to be Admission Status: inpatient status to the service of Dr. Martínez .               Follow-up Information    None         Current Discharge Medication List        CONTINUE these medications which have NOT CHANGED    Details   glipiZIDE (GLUCOTROL XL) 5 mg 24 hr tablet Take 5 mg by mouth      levothyroxine 112 mcg tablet Take 112 mcg by mouth 2 (two) times a day      pyridostigmine (MESTINON) 60 mg tablet Take 60 mg by mouth 4 (four) times a day       acetaminophen (TYLENOL) 325 mg tablet Take 325 mg by mouth continuous as needed      Atropine Sulfate 0.25 MG/5ML SOSY Take 0.25 mg by mouth 3 (three) times a day as needed      carbidopa-levodopa (SINEMET)  mg per tablet Take 1 tablet by mouth 3 (three) times a day  Qty: 90 tablet, Refills: 5    Associated Diagnoses: Parkinson's disease      Cholecalciferol (VITAMIN D3) 50 MCG (2000 UT) capsule Take 2,000 Units by mouth      cholestyramine (QUESTRAN) 4 g packet Take 1 packet (4 g total) by mouth 2 (two) times a day with meals  Qty: 62 packet, Refills: 3    Associated Diagnoses: Rectal urgency      Cyanocobalamin 1000 MCG SUBL Place 1,000 mcg under the tongue daily 4 times a week      folic acid (FOLVITE) 1 mg tablet Take 1 mg by mouth 2 (two) times a day      Insulin Aspart (NovoLOG) 100 units/mL injection Inject 14 Units under the skin 3 (three) times a day with meals      insulin glargine (LANTUS) 100 units/mL subcutaneous injection Inject 40 Units under the skin daily at bedtime      oxybutynin (DITROPAN) 5 mg tablet  Take 5 mg by mouth      pantoprazole (PROTONIX) 40 mg tablet Take 1 tablet (40 mg total) by mouth daily in the early morning  Qty: 30 tablet, Refills: 0    Associated Diagnoses: Abdominal pain, unspecified abdominal location      rivaroxaban (XARELTO) 20 mg tablet Take 20 mg by mouth      saxagliptin (ONGLYZA) 5 MG tablet Take 5 mg by mouth daily      sildenafil (VIAGRA) 100 mg tablet Take 100 mg by mouth as needed       simvastatin (ZOCOR) 20 mg tablet Take 10 mg by mouth daily Ordered as 20mg pt takes half a tab             No discharge procedures on file.    PDMP Review         Value Time User    PDMP Reviewed  Yes 8/14/2023 10:03 PM Ancelmo Peters MD            ED Provider  Electronically Signed by             Mya Galan MD  09/01/24 0718       Mya Galan MD  09/01/24 0729

## 2024-09-01 NOTE — ASSESSMENT & PLAN NOTE
Lab Results   Component Value Date    [id number] 29.954 (H) 09/01/2024      TSH 41.234 on admission, will repeat  Patient unclear on medication compliance or dosing  Will continues levothyroxine 112 BID for now  Endocrinology consult, appreciate input

## 2024-09-01 NOTE — ASSESSMENT & PLAN NOTE
TSH 41.234 on admission, will repeat  Patient unclear on medication compliance or dosing  Will continues levothyroxine 112 BID for now  Endocrinology consult, appreciate input

## 2024-09-01 NOTE — DISCHARGE SUMMARY
[facility]   Discharge - AMA - Name: Kalpesh Haro I  MRN: [id number]  Unit/Bed#: -01 I Date of Admission: 8/31/2024   Date of Service: 9/1/2024  Hospital Day: 0    PATIENT LEAVING AGAINST MEDICAL ADVICE; AMA FORM SIGNED AND UPLOADED TO CHART    Principal Problem:    Pneumonia of right upper lobe due to infectious organism  Active Problems:    Sepsis (HCC)    Type 2 diabetes mellitus with hyperglycemia, with long-term current use of insulin (HCC)    Tobacco abuse    Hyponatremia    Personal history of DVT and PE    Hypothyroidism    Myasthenia gravis (HCC)      Medical Problems       Resolved Problems  Date Reviewed: 9/1/2024   None         Discharging Physician / Practitioner: Luan Stubbs DO  PCP: Maurizio Bonilla MD  Admission Date:   Admission Orders (From admission, onward)       Ordered        09/01/24 0307  INPATIENT ADMISSION  Once                          Discharge Date: 09/01/24    Consultations During Hospital Stay:  IP CONSULT TO ENDOCRINOLOGY    Procedures Performed:   None    Significant Findings / Test Results:   CT chest abdomen pelvis w contrast    Result Date: 9/1/2024  Impression: 1.  Right upper lobe pneumonia. Focal area of possible cavitation. Recommend follow-up CT in 6 to 8 weeks to evaluate for underlying lesion. 2.  Emphysema 3.  Mediastinal/right hilar lymphadenopathy 4.  Ill-defined hepatic hypodensity in the gallbladder fossa, favored to represent focal fatty infiltration, this can also be followed up with the impression 1 finding. Follow-up contrast-enhanced chest and abdomen should be ordered. The study was marked in EPIC for significant notification. Workstation performed: [id number]       No Chest XR results available for this patient.   No results found for this or any previous visit.      Recent Labs     09/01/24  0316 09/01/24  0726   BLOODCX Received in Microbiology Lab. Culture in Progress.  Received in Microbiology Lab. Culture in  Progress.  --    LEGIONELLAUR  --  Negative   STPU  --  Negative       Incidental Findings:   None other than noted above; I reviewed the above mentioned incidental findings with the patient and/or family and they expressed understanding.    Test Results Pending at Discharge (will require follow up):   None     Outpatient Tests Requested:  None    Complications:  None    Reason for Admission:   Chief Complaint   Patient presents with    Weakness - Generalized     Pt states feeling very weak for a few days, with difficulty ambulating. Has not taken medications for diabetes, thyroid, MG, parkinson's since about April. Denies sob/chest pain        Hospital Course:   Kalpesh Haro is a 73 y.o. male patient who originally presented to the hospital on 8/31/2024 due to Weakness - Generalized (Pt states feeling very weak for a few days, with difficulty ambulating. Has not taken medications for diabetes, thyroid, MG, parkinson's since about April. Denies sob/chest pain )    Review of chart showed patient  has a past medical history of Diabetes mellitus (HCC), Disease of thyroid gland, History of blood clots, Myasthenia due to diabetes (HCC), Prostate cancer (HCC), and Thyroid cancer (HCC).  50-pack-year smoking history, currently smoking.    Admitted due to meeting sepsis criteria for upper lobe pneumonia and was initiated on empiric antibiotics.  Patient also received sepsis fluids due to meeting sepsis criteria.  There is extensive discussion regarding patient's smoking habits but patient states he is not interested in stopping smoking.  Later in the day, patient finding that patient wished to leave AGAINST MEDICAL ADVICE.  At bedside, discussed extensively regarding risks of leaving medical advice but patient adamant that he was to leave but when asked the reason, he stated that he was not allowed to smoke cigarettes while in the hospital and for that reason he would be leaving.  Educated the risks of the AGAINST MEDICAL  "ADVICE along with the risks of continuing to smoke, especially in the setting of an active pneumonia infection.  Patient read and on leaving, but agreeable to taking prescription antibiotics that was sent to patient's pharmacy.    PATIENT LEAVING AGAINST MEDICAL ADVICE; AMA FORM SIGNED AND UPLOADED TO CHART    Kalpesh Haro or his authorized caregiver has made the decision for the patient to leave the emergency department against the advice of Primary Hospitalist Team He or his authorized caregiver has been informed and understands the inherent risks, including death. He or his authorized caregiver has decided to accept the responsibility for this decision.     Kalpesh Haro and all necessary parties have been advised that he may return for further evaluation or treatment.    Patient encouraged to seek additional medical care if he is to clinically worsen.  Prescribed 5-day course of cefdinir and recommended to follow-up with PCP within 1 week to ensure resolution of symptoms.    The patient, initially admitted to the hospital as inpatient, was discharged earlier than expected given the following: leaving AMA.    Please see above list of diagnoses and related plan for additional information.     Condition at Discharge: guarded    Discharge Day Visit / Exam:   Subjective:  see same day H&P  for additional details - very ill appearing  Vitals: Blood Pressure: 100/52 (09/01/24 1526)  Pulse: 84 (09/01/24 1526)  Temperature: 98.5 °F (36.9 °C) (09/01/24 1526)  Temp Source: Rectal (09/01/24 0130)  Respirations: 16 (09/01/24 0717)  Height: 5' 6\" (167.6 cm) (09/01/24 0407)  Weight - Scale: 66.4 kg (146 lb 6.2 oz) (09/01/24 0407)  SpO2: 93 % (09/01/24 1526)  Exam:     Discussion with Family: Patient declined call to .     Discharge instructions/Information to patient and family:   See after visit summary for information provided to patient and family.      Provisions for Follow-Up Care:  See after visit summary " for information related to follow-up care and any pertinent home health orders.       Mobility at time of Discharge:   Basic Mobility Inpatient Raw Score: 19  JH-HLM Goal: 6: Walk 10 steps or more  JH-HLM Achieved: 5: Stand (1 or more minutes)  HLM Goal NOT achieved. Continue to encourage mobility in post discharge setting.    Disposition:   Other: AMA    Planned Readmission: none     Discharge Statement:  I spent 37 minutes discharging the patient. This time was spent on the day of discharge. I had direct contact with the patient on the day of discharge. Greater than 50% of the total time was spent examining patient, answering all patient questions, arranging and discussing plan of care with patient as well as directly providing post-discharge instructions.  Additional time then spent on discharge activities.    Discharge Medications:  See after visit summary for reconciled discharge medications provided to patient and/or family.      **Please Note: This note may have been constructed using a voice recognition system**

## 2024-09-01 NOTE — ASSESSMENT & PLAN NOTE
Current every day smoker  Tobacco cessation counseling provided for > 3 min  Patient declining NRT

## 2024-09-01 NOTE — ASSESSMENT & PLAN NOTE
Tobacco Use: High Risk (8/31/2024)    Patient History     Smoking Tobacco Use: Every Day     Smokeless Tobacco Use: Never     Passive Exposure: Not on file     Tobacco cessation counseling provided for > 3 min  NRT ordered with patch and PRN nicorette gum

## 2024-09-01 NOTE — ED NOTES
Friend at bedside, information verified as pt emergency contact. Will update any pertinent findings as per patient friend request. Safety parameters maintained, call bell within reach for assistance.     Marianne Walton RN  09/01/24 0212

## 2024-09-01 NOTE — ASSESSMENT & PLAN NOTE
Lab Results   Component Value Date    HGBA1C 12.8 (H) 08/14/2023       Recent Labs     08/31/24  2337   POCGLU 423*       Blood Sugar Average: Last 72 hrs:  (P) 423    Poorly controlled blood sugar, patient unclear on medication compliance or current dosing  Blood sugar 423, not meeting DKA, criteria  Lantus 20u at night, 5-5-5 meal coverage, SSI coverage, goal 140-180  POC glucose checks AC/HS  Hypoglycemia protocol  Carb controlled diet

## 2024-09-01 NOTE — PLAN OF CARE
Problem: Potential for Falls  Goal: Patient will remain free of falls  Description: INTERVENTIONS:  - Educate patient/family on patient safety including physical limitations  - Instruct patient to call for assistance with activity   - Consult OT/PT to assist with strengthening/mobility   - Keep Call bell within reach  - Keep bed low and locked with side rails adjusted as appropriate  - Keep care items and personal belongings within reach  - Initiate and maintain comfort rounds  - Make Fall Risk Sign visible to staff  - Offer Toileting every 2 Hours, in advance of need  - Initiate/Maintain bed alarm  - Obtain necessary fall risk management equipment: walker  - Apply yellow socks and bracelet for high fall risk patients  - Consider moving patient to room near nurses station  Outcome: Progressing     Problem: Prexisting or High Potential for Compromised Skin Integrity  Goal: Skin integrity is maintained or improved  Description: INTERVENTIONS:  - Identify patients at risk for skin breakdown  - Assess and monitor skin integrity  - Assess and monitor nutrition and hydration status  - Monitor labs   - Assess for incontinence   - Turn and reposition patient  - Assist with mobility/ambulation  - Relieve pressure over bony prominences  - Avoid friction and shearing  - Provide appropriate hygiene as needed including keeping skin clean and dry  - Evaluate need for skin moisturizer/barrier cream  - Collaborate with interdisciplinary team   - Patient/family teaching  - Consider wound care consult   Outcome: Progressing

## 2024-09-01 NOTE — ASSESSMENT & PLAN NOTE
Concern for Sepsis POA as evidenced by tachycardia, tachypnea  Suspected source: RUL PNA    Recent Labs     09/01/24  0003   WBC 9.61     Recent Labs     09/01/24  0154   LACTICACID 0.9       Plan:  Blood/Sputum clx  Trend WBC/Fever curve  Abx rocephin  IV Fluid hydration per sepsis protocol

## 2024-09-01 NOTE — ED NOTES
"Pt found impulsive at the bottom of stretcher attempting to stand, incontinent of stool and urine. Scrotum observed reddened. Pt family reports \"he is constantly wet lately its so hard\". Incontinent care provided, pt repositioned on stretcher for comfort. Safety parameters maintained. RN at bedside.      Marianne Walton RN  09/01/24 0116    "

## 2024-09-01 NOTE — ED NOTES
Pt observed sleeping on stretcher, no distress noted. Pt friend leaving bedside, verbalizes to call listed phone number with any change of status or update. Safety parameters maintained, call bell within reach for assistance.     Marianne Walton RN  09/01/24 9890

## 2024-09-01 NOTE — ASSESSMENT & PLAN NOTE
Recent Labs     09/01/24  0003   SODIUM 127*     Lab Results   Component Value Date    OSMOLALITSER 290 08/14/2023       Hypovolemic/volume depletion    Likely hypovolemic hypoosmolar hyponatremia    Plan:  Serum osmoles, Urine osmoles, Urine sodium   Goal sodium increase 6-8 in 24 hours  Avoid over-correction to prevent osmotic demyelination syndrome  Encourage adequate oral intake  Monitor BMP q12; Adjust fluid rate/concentration accordingly

## 2024-09-01 NOTE — ASSESSMENT & PLAN NOTE
Lab Results   Component Value Date    HGBA1C 12.8 (H) 08/14/2023       Recent Labs     08/31/24  2337 09/01/24  0003 09/01/24  0715   POCGLU 423*  --  299*   GLUC  --  423*  --         Blood Sugar Average: Last 72 hrs:  (P) 361    Recheck A1c  Poorly controlled blood sugar, patient unclear on medication compliance or current dosing  Insulin:  Lantus 20u HS  Humalog 5 u TID AC,  SSI and POC gluc checks ACHS, goal 140-180  Hypoglycemia protocol  Carb controlled diet

## 2024-09-06 LAB
BACTERIA BLD CULT: NORMAL
BACTERIA BLD CULT: NORMAL

## 2024-09-19 ENCOUNTER — HOSPITAL ENCOUNTER (INPATIENT)
Facility: HOSPITAL | Age: 74
LOS: 1 days | Discharge: LEFT BEFORE DISCHARGE | DRG: 639 | End: 2024-09-20
Attending: STUDENT IN AN ORGANIZED HEALTH CARE EDUCATION/TRAINING PROGRAM | Admitting: STUDENT IN AN ORGANIZED HEALTH CARE EDUCATION/TRAINING PROGRAM
Payer: COMMERCIAL

## 2024-09-19 ENCOUNTER — APPOINTMENT (EMERGENCY)
Dept: CT IMAGING | Facility: HOSPITAL | Age: 74
DRG: 639 | End: 2024-09-19
Payer: COMMERCIAL

## 2024-09-19 DIAGNOSIS — R53.1 GENERALIZED WEAKNESS: ICD-10-CM

## 2024-09-19 DIAGNOSIS — R62.7 FAILURE TO THRIVE IN ADULT: ICD-10-CM

## 2024-09-19 DIAGNOSIS — B37.2 SKIN YEAST INFECTION: ICD-10-CM

## 2024-09-19 DIAGNOSIS — R53.1 WEAKNESS: Primary | ICD-10-CM

## 2024-09-19 DIAGNOSIS — R73.9 HYPERGLYCEMIA: ICD-10-CM

## 2024-09-19 PROBLEM — B37.9 CANDIDIASIS: Status: ACTIVE | Noted: 2024-09-19

## 2024-09-19 LAB
ALBUMIN SERPL BCG-MCNC: 3.4 G/DL (ref 3.5–5)
ALP SERPL-CCNC: 97 U/L (ref 34–104)
ALT SERPL W P-5'-P-CCNC: 82 U/L (ref 7–52)
ANION GAP SERPL CALCULATED.3IONS-SCNC: 10 MMOL/L (ref 4–13)
AST SERPL W P-5'-P-CCNC: 77 U/L (ref 13–39)
BASE EX.OXY STD BLDV CALC-SCNC: 76 % (ref 60–80)
BASE EXCESS BLDV CALC-SCNC: 1.2 MMOL/L
BASOPHILS # BLD AUTO: 0.07 THOUSANDS/ΜL (ref 0–0.1)
BASOPHILS NFR BLD AUTO: 1 % (ref 0–1)
BILIRUB SERPL-MCNC: 0.66 MG/DL (ref 0.2–1)
BUN SERPL-MCNC: 30 MG/DL (ref 5–25)
CALCIUM ALBUM COR SERPL-MCNC: 9 MG/DL (ref 8.3–10.1)
CALCIUM SERPL-MCNC: 8.5 MG/DL (ref 8.4–10.2)
CHLORIDE SERPL-SCNC: 93 MMOL/L (ref 96–108)
CO2 SERPL-SCNC: 28 MMOL/L (ref 21–32)
CREAT SERPL-MCNC: 0.92 MG/DL (ref 0.6–1.3)
EOSINOPHIL # BLD AUTO: 0.09 THOUSAND/ΜL (ref 0–0.61)
EOSINOPHIL NFR BLD AUTO: 1 % (ref 0–6)
ERYTHROCYTE [DISTWIDTH] IN BLOOD BY AUTOMATED COUNT: 13.5 % (ref 11.6–15.1)
GFR SERPL CREATININE-BSD FRML MDRD: 81 ML/MIN/1.73SQ M
GLUCOSE SERPL-MCNC: 428 MG/DL (ref 65–140)
GLUCOSE SERPL-MCNC: 476 MG/DL (ref 65–140)
GLUCOSE SERPL-MCNC: 480 MG/DL (ref 65–140)
HCO3 BLDV-SCNC: 28.7 MMOL/L (ref 24–30)
HCT VFR BLD AUTO: 43.1 % (ref 36.5–49.3)
HGB BLD-MCNC: 14.5 G/DL (ref 12–17)
IMM GRANULOCYTES # BLD AUTO: 0.13 THOUSAND/UL (ref 0–0.2)
IMM GRANULOCYTES NFR BLD AUTO: 2 % (ref 0–2)
LIPASE SERPL-CCNC: 35 U/L (ref 11–82)
LYMPHOCYTES # BLD AUTO: 2.11 THOUSANDS/ΜL (ref 0.6–4.47)
LYMPHOCYTES NFR BLD AUTO: 29 % (ref 14–44)
MCH RBC QN AUTO: 32.1 PG (ref 26.8–34.3)
MCHC RBC AUTO-ENTMCNC: 33.6 G/DL (ref 31.4–37.4)
MCV RBC AUTO: 95 FL (ref 82–98)
MONOCYTES # BLD AUTO: 0.47 THOUSAND/ΜL (ref 0.17–1.22)
MONOCYTES NFR BLD AUTO: 7 % (ref 4–12)
NEUTROPHILS # BLD AUTO: 4.35 THOUSANDS/ΜL (ref 1.85–7.62)
NEUTS SEG NFR BLD AUTO: 60 % (ref 43–75)
NRBC BLD AUTO-RTO: 0 /100 WBCS
O2 CT BLDV-SCNC: 17 ML/DL
PCO2 BLDV: 56.5 MM HG (ref 42–50)
PH BLDV: 7.32 [PH] (ref 7.3–7.4)
PLATELET # BLD AUTO: 320 THOUSANDS/UL (ref 149–390)
PMV BLD AUTO: 9.2 FL (ref 8.9–12.7)
PO2 BLDV: 42.7 MM HG (ref 35–45)
POTASSIUM SERPL-SCNC: 4.4 MMOL/L (ref 3.5–5.3)
PROT SERPL-MCNC: 7.1 G/DL (ref 6.4–8.4)
RBC # BLD AUTO: 4.52 MILLION/UL (ref 3.88–5.62)
SODIUM SERPL-SCNC: 131 MMOL/L (ref 135–147)
WBC # BLD AUTO: 7.22 THOUSAND/UL (ref 4.31–10.16)

## 2024-09-19 PROCEDURE — 83690 ASSAY OF LIPASE: CPT | Performed by: STUDENT IN AN ORGANIZED HEALTH CARE EDUCATION/TRAINING PROGRAM

## 2024-09-19 PROCEDURE — 82948 REAGENT STRIP/BLOOD GLUCOSE: CPT

## 2024-09-19 PROCEDURE — 99223 1ST HOSP IP/OBS HIGH 75: CPT | Performed by: STUDENT IN AN ORGANIZED HEALTH CARE EDUCATION/TRAINING PROGRAM

## 2024-09-19 PROCEDURE — 85025 COMPLETE CBC W/AUTO DIFF WBC: CPT | Performed by: STUDENT IN AN ORGANIZED HEALTH CARE EDUCATION/TRAINING PROGRAM

## 2024-09-19 PROCEDURE — 70450 CT HEAD/BRAIN W/O DYE: CPT

## 2024-09-19 PROCEDURE — 36415 COLL VENOUS BLD VENIPUNCTURE: CPT | Performed by: STUDENT IN AN ORGANIZED HEALTH CARE EDUCATION/TRAINING PROGRAM

## 2024-09-19 PROCEDURE — 99285 EMERGENCY DEPT VISIT HI MDM: CPT | Performed by: STUDENT IN AN ORGANIZED HEALTH CARE EDUCATION/TRAINING PROGRAM

## 2024-09-19 PROCEDURE — 80053 COMPREHEN METABOLIC PANEL: CPT | Performed by: STUDENT IN AN ORGANIZED HEALTH CARE EDUCATION/TRAINING PROGRAM

## 2024-09-19 PROCEDURE — 99285 EMERGENCY DEPT VISIT HI MDM: CPT

## 2024-09-19 PROCEDURE — 82805 BLOOD GASES W/O2 SATURATION: CPT | Performed by: STUDENT IN AN ORGANIZED HEALTH CARE EDUCATION/TRAINING PROGRAM

## 2024-09-19 RX ORDER — NICOTINE 21 MG/24HR
1 PATCH, TRANSDERMAL 24 HOURS TRANSDERMAL DAILY
Status: DISCONTINUED | OUTPATIENT
Start: 2024-09-20 | End: 2024-09-20 | Stop reason: HOSPADM

## 2024-09-19 RX ORDER — INSULIN LISPRO 100 [IU]/ML
1-5 INJECTION, SOLUTION INTRAVENOUS; SUBCUTANEOUS
Status: DISCONTINUED | OUTPATIENT
Start: 2024-09-19 | End: 2024-09-20 | Stop reason: HOSPADM

## 2024-09-19 RX ORDER — NYSTATIN 100000 [USP'U]/G
POWDER TOPICAL 2 TIMES DAILY
Status: DISCONTINUED | OUTPATIENT
Start: 2024-09-19 | End: 2024-09-20 | Stop reason: HOSPADM

## 2024-09-19 RX ORDER — INSULIN LISPRO 100 [IU]/ML
5 INJECTION, SOLUTION INTRAVENOUS; SUBCUTANEOUS
Status: DISCONTINUED | OUTPATIENT
Start: 2024-09-19 | End: 2024-09-19

## 2024-09-19 RX ORDER — INSULIN LISPRO 100 [IU]/ML
12 INJECTION, SOLUTION INTRAVENOUS; SUBCUTANEOUS
Status: DISCONTINUED | OUTPATIENT
Start: 2024-09-20 | End: 2024-09-20 | Stop reason: HOSPADM

## 2024-09-19 RX ORDER — SODIUM CHLORIDE, SODIUM LACTATE, POTASSIUM CHLORIDE, CALCIUM CHLORIDE 600; 310; 30; 20 MG/100ML; MG/100ML; MG/100ML; MG/100ML
100 INJECTION, SOLUTION INTRAVENOUS CONTINUOUS
Status: DISCONTINUED | OUTPATIENT
Start: 2024-09-19 | End: 2024-09-20 | Stop reason: HOSPADM

## 2024-09-19 RX ORDER — LEVOTHYROXINE SODIUM 112 UG/1
112 TABLET ORAL
Status: DISCONTINUED | OUTPATIENT
Start: 2024-09-20 | End: 2024-09-20 | Stop reason: HOSPADM

## 2024-09-19 RX ORDER — CARBIDOPA AND LEVODOPA 25; 100 MG/1; MG/1
1 TABLET ORAL 3 TIMES DAILY
Status: DISCONTINUED | OUTPATIENT
Start: 2024-09-19 | End: 2024-09-20 | Stop reason: HOSPADM

## 2024-09-19 RX ORDER — PYRIDOSTIGMINE BROMIDE 60 MG/1
60 TABLET ORAL 4 TIMES DAILY
Status: DISCONTINUED | OUTPATIENT
Start: 2024-09-20 | End: 2024-09-20 | Stop reason: HOSPADM

## 2024-09-19 RX ORDER — INSULIN GLARGINE 100 [IU]/ML
44 INJECTION, SOLUTION SUBCUTANEOUS
Status: DISCONTINUED | OUTPATIENT
Start: 2024-09-19 | End: 2024-09-20 | Stop reason: HOSPADM

## 2024-09-19 RX ADMIN — NYSTATIN: 100000 POWDER TOPICAL at 17:22

## 2024-09-19 RX ADMIN — SODIUM CHLORIDE 1000 ML: 0.9 INJECTION, SOLUTION INTRAVENOUS at 16:27

## 2024-09-19 RX ADMIN — INSULIN GLARGINE 44 UNITS: 100 INJECTION, SOLUTION SUBCUTANEOUS at 22:30

## 2024-09-19 RX ADMIN — CARBIDOPA AND LEVODOPA 1 TABLET: 25; 100 TABLET ORAL at 22:30

## 2024-09-19 RX ADMIN — INSULIN HUMAN 8 UNITS: 100 INJECTION, SOLUTION PARENTERAL at 22:47

## 2024-09-19 RX ADMIN — INSULIN LISPRO 5 UNITS: 100 INJECTION, SOLUTION INTRAVENOUS; SUBCUTANEOUS at 19:13

## 2024-09-19 RX ADMIN — INSULIN HUMAN 7 UNITS: 100 INJECTION, SOLUTION PARENTERAL at 19:08

## 2024-09-19 RX ADMIN — SODIUM CHLORIDE, SODIUM LACTATE, POTASSIUM CHLORIDE, AND CALCIUM CHLORIDE 100 ML/HR: .6; .31; .03; .02 INJECTION, SOLUTION INTRAVENOUS at 19:04

## 2024-09-19 NOTE — ED PROVIDER NOTES
1. Weakness    2. Hyperglycemia    3. Skin yeast infection    4. Failure to thrive in adult    5. Generalized weakness      ED Disposition       ED Disposition   Admit    Condition   Stable    Date/Time   u Sep 19, 2024  5:53 PM    Comment   Case was discussed with hospitalist and the patient's admission status was agreed to be Admission Status: observation status to the service of Dr. Morillo .               Assessment & Plan       Medical Decision Making  Amount and/or Complexity of Data Reviewed  Labs: ordered.  Radiology: ordered.    Risk  Prescription drug management.  Decision regarding hospitalization.      Differential DKA, HHS, medication noncompliance, failure to thrive, dementia.    Patient is a 73-year-old male present [facility] department in no acute respiratory distress and vitals unremarkable.  Patient is intermittently confused at bedside and is not currently taking care of himself.  Patient was covered in stool and urine on arrival.  Patient did not remember how to remove his briefs.  Spoke to the patient's APS staff who was concerned for his ability to take care of himself.  Patient has skin breakdown around his groin and genitalia.  Patient has a yeast infection present discussed admission process for further management and patient is agreeable.  Dispo admit               Medications   sodium chloride 0.9 % bolus 1,000 mL (0 mL Intravenous Stopped 9/19/24 1904)   insulin regular (HumuLIN R,NovoLIN R) injection 7 Units (7 Units Intravenous Given 9/19/24 1908)       History of Present Illness       HPI    Patient is a 73-year-old male presenting to the [facility] apartment for hyperglycemia.  Patient reports having increased urination.  Patient says he keeps peeing on himself and does not change his briefs.  Patient unable to verbalize any additional concerns at this time.  Spoke to the patient's APS worker who is concerned for his health.  Reports that he has not been taking care of himself and having  appropriate hygiene.  Patient unable to complete a full review of systems.  History of diabetes, prostate cancer and thyroid cancer.  Everyday smoker.    Review of Systems   Constitutional:  Negative for chills and fever.   HENT:  Negative for ear pain and sore throat.    Eyes:  Negative for pain and visual disturbance.   Respiratory:  Negative for cough and shortness of breath.    Cardiovascular:  Negative for chest pain and palpitations.   Gastrointestinal:  Negative for abdominal pain and vomiting.   Endocrine: Positive for polyuria.   Genitourinary:  Negative for dysuria and hematuria.   Musculoskeletal:  Negative for arthralgias and back pain.   Skin:  Negative for color change and rash.   Neurological:  Positive for weakness. Negative for seizures and syncope.   All other systems reviewed and are negative.          Objective     ED Triage Vitals [09/19/24 1519]   Temperature Pulse Blood Pressure Respirations SpO2 Patient Position - Orthostatic VS   98.4 °F (36.9 °C) 83 123/73 18 96 % Sitting      Temp Source Heart Rate Source BP Location FiO2 (%) Pain Score    Oral Monitor Right arm -- No Pain        Physical Exam  Vitals and nursing note reviewed.   Constitutional:       General: He is not in acute distress.     Appearance: He is well-developed.   HENT:      Head: Normocephalic and atraumatic.      Nose: Nose normal.      Mouth/Throat:      Mouth: Mucous membranes are moist.      Pharynx: Oropharynx is clear.   Eyes:      Extraocular Movements: Extraocular movements intact.      Conjunctiva/sclera: Conjunctivae normal.      Pupils: Pupils are equal, round, and reactive to light.   Cardiovascular:      Rate and Rhythm: Normal rate and regular rhythm.      Heart sounds: No murmur heard.  Pulmonary:      Effort: Pulmonary effort is normal. No respiratory distress.      Breath sounds: Normal breath sounds.   Abdominal:      Palpations: Abdomen is soft.      Tenderness: There is no abdominal tenderness.    Musculoskeletal:         General: No swelling.      Cervical back: Neck supple.   Skin:     General: Skin is warm and dry.      Capillary Refill: Capillary refill takes less than 2 seconds.   Neurological:      Mental Status: He is alert. Mental status is at baseline.   Psychiatric:         Mood and Affect: Mood normal.         Labs Reviewed   COMPREHENSIVE METABOLIC PANEL - Abnormal       Result Value    Sodium 131 (*)     Potassium 4.4      Chloride 93 (*)     CO2 28      ANION GAP 10      BUN 30 (*)     Creatinine 0.92      Glucose 428 (*)     Calcium 8.5      Corrected Calcium 9.0      AST 77 (*)     ALT 82 (*)     Alkaline Phosphatase 97      Total Protein 7.1      Albumin 3.4 (*)     Total Bilirubin 0.66      eGFR 81      Narrative:     National Kidney Disease Foundation guidelines for Chronic Kidney Disease (CKD):     Stage 1 with normal or high GFR (GFR > 90 mL/min/1.73 square meters)    Stage 2 Mild CKD (GFR = 60-89 mL/min/1.73 square meters)    Stage 3A Moderate CKD (GFR = 45-59 mL/min/1.73 square meters)    Stage 3B Moderate CKD (GFR = 30-44 mL/min/1.73 square meters)    Stage 4 Severe CKD (GFR = 15-29 mL/min/1.73 square meters)    Stage 5 End Stage CKD (GFR <15 mL/min/1.73 square meters)  Note: GFR calculation is accurate only with a steady state creatinine   BLOOD GAS, VENOUS - Abnormal    pH, Ben 7.323      pCO2, Ben 56.5 (*)     pO2, Ben 42.7      HCO3, Ben 28.7      Base Excess, Ben 1.2      O2 Content, Ben 17.0      O2 HGB, VENOUS 76.0     TSH, 3RD GENERATION WITH FREE T4 REFLEX - Abnormal    TSH 3RD GENERATON 41.084 (*)    COMPREHENSIVE METABOLIC PANEL - Abnormal    Sodium 134 (*)     Potassium 4.2      Chloride 100      CO2 28      ANION GAP 6      BUN 29 (*)     Creatinine 0.80      Glucose 225 (*)     Calcium 8.2 (*)     Corrected Calcium 9.0      AST 56 (*)     ALT 13      Alkaline Phosphatase 86      Total Protein 6.3 (*)     Albumin 3.0 (*)     Total Bilirubin 0.40      eGFR 87       "Narrative:     National Kidney Disease Foundation guidelines for Chronic Kidney Disease (CKD):     Stage 1 with normal or high GFR (GFR > 90 mL/min/1.73 square meters)    Stage 2 Mild CKD (GFR = 60-89 mL/min/1.73 square meters)    Stage 3A Moderate CKD (GFR = 45-59 mL/min/1.73 square meters)    Stage 3B Moderate CKD (GFR = 30-44 mL/min/1.73 square meters)    Stage 4 Severe CKD (GFR = 15-29 mL/min/1.73 square meters)    Stage 5 End Stage CKD (GFR <15 mL/min/1.73 square meters)  Note: GFR calculation is accurate only with a steady state creatinine   T4, FREE - Abnormal    Free T4 <0.25 (*)     Narrative:     \"Therapeutic range for patients medicated with thyroid disorders: 0.61-1.24 ng/dL.\"   POCT GLUCOSE - Abnormal    POC Glucose 476 (*)    POCT GLUCOSE - Abnormal    POC Glucose 480 (*)    POCT GLUCOSE - Abnormal    POC Glucose 284 (*)    POCT GLUCOSE - Abnormal    POC Glucose 274 (*)    LIPASE - Normal    Lipase 35     MAGNESIUM - Normal    Magnesium 2.1     CBC AND DIFFERENTIAL    WBC 7.22      RBC 4.52      Hemoglobin 14.5      Hematocrit 43.1      MCV 95      MCH 32.1      MCHC 33.6      RDW 13.5      MPV 9.2      Platelets 320      nRBC 0      Segmented % 60      Immature Grans % 2      Lymphocytes % 29      Monocytes % 7      Eosinophils Relative 1      Basophils Relative 1      Absolute Neutrophils 4.35      Absolute Immature Grans 0.13      Absolute Lymphocytes 2.11      Absolute Monocytes 0.47      Eosinophils Absolute 0.09      Basophils Absolute 0.07     CBC AND DIFFERENTIAL    WBC 7.08      RBC 4.28      Hemoglobin 13.6      Hematocrit 40.2      MCV 94      MCH 31.8      MCHC 33.8      RDW 13.6      MPV 9.3      Platelets 318      nRBC 0      Segmented % 51      Immature Grans % 2      Lymphocytes % 36      Monocytes % 8      Eosinophils Relative 2      Basophils Relative 1      Absolute Neutrophils 3.55      Absolute Immature Grans 0.14      Absolute Lymphocytes 2.57      Absolute Monocytes 0.58      " Eosinophils Absolute 0.17      Basophils Absolute 0.07       CT head without contrast   Final Interpretation by Filipe Phillips MD (09/19 1621)      No acute intracranial abnormality.                  Workstation performed: [id number]             Procedures    ED Medication and Procedure Management   Prior to Admission Medications   Prescriptions Last Dose Informant Patient Reported? Taking?   Atropine Sulfate 0.25 MG/5ML SOSY Unknown Self Yes No   Sig: Take 0.25 mg by mouth 3 (three) times a day as needed   Cholecalciferol (VITAMIN D3) 50 MCG (2000 UT) capsule Past Week Self Yes Yes   Sig: Take 2,000 Units by mouth   Cyanocobalamin 1000 MCG SUBL Not Taking Self Yes No   Sig: Place 1,000 mcg under the tongue daily 4 times a week   Insulin Aspart (NovoLOG) 100 units/mL injection   Yes No   Sig: Inject 14 Units under the skin 3 (three) times a day with meals   acetaminophen (TYLENOL) 325 mg tablet  Self Yes No   Sig: Take 325 mg by mouth continuous as needed   carbidopa-levodopa (SINEMET)  mg per tablet 9/18/2024  No Yes   Sig: Take 1 tablet by mouth 3 (three) times a day   cholestyramine (QUESTRAN) 4 g packet 9/18/2024 Self No Yes   Sig: Take 1 packet (4 g total) by mouth 2 (two) times a day with meals   folic acid (FOLVITE) 1 mg tablet 9/18/2024 Self Yes Yes   Sig: Take 1 mg by mouth 2 (two) times a day   glipiZIDE (GLUCOTROL XL) 5 mg 24 hr tablet 9/18/2024 Self Yes Yes   Sig: Take 5 mg by mouth   insulin glargine (LANTUS) 100 units/mL subcutaneous injection   Yes No   Sig: Inject 40 Units under the skin daily at bedtime   levothyroxine 112 mcg tablet 9/18/2024 Self Yes Yes   Sig: Take 112 mcg by mouth 2 (two) times a day   oxybutynin (DITROPAN) 5 mg tablet Not Taking  Yes No   Sig: Take 5 mg by mouth   pantoprazole (PROTONIX) 40 mg tablet Not Taking Self No No   Sig: Take 1 tablet (40 mg total) by mouth daily in the early morning   Patient not taking: Reported on 9/1/2024   pyridostigmine  (MESTINON) 60 mg tablet Not Taking Self Yes No   Sig: Take 60 mg by mouth 4 (four) times a day    Patient not taking: Reported on 9/19/2024   rivaroxaban (XARELTO) 20 mg tablet Not Taking  Yes No   Sig: Take 20 mg by mouth   Patient not taking: Reported on 9/1/2024   saxagliptin (ONGLYZA) 5 MG tablet Not Taking Self Yes No   Sig: Take 5 mg by mouth daily   sildenafil (VIAGRA) 100 mg tablet Not Taking Self Yes No   Sig: Take 100 mg by mouth as needed    simvastatin (ZOCOR) 20 mg tablet Not Taking Self Yes No   Sig: Take 10 mg by mouth daily Ordered as 20mg pt takes half a tab      Facility-Administered Medications: None     Discharge Medication List as of 9/20/2024 11:37 AM        CONTINUE these medications which have NOT CHANGED    Details   carbidopa-levodopa (SINEMET)  mg per tablet Take 1 tablet by mouth 3 (three) times a day, Starting Fri 8/20/2021, Normal      Cholecalciferol (VITAMIN D3) 50 MCG (2000 UT) capsule Take 2,000 Units by mouth, Historical Med      cholestyramine (QUESTRAN) 4 g packet Take 1 packet (4 g total) by mouth 2 (two) times a day with meals, Starting Wed 6/10/2020, Normal      folic acid (FOLVITE) 1 mg tablet Take 1 mg by mouth 2 (two) times a day, Starting Thu 6/9/2011, Historical Med      glipiZIDE (GLUCOTROL XL) 5 mg 24 hr tablet Take 5 mg by mouth, Historical Med      levothyroxine 112 mcg tablet Take 112 mcg by mouth 2 (two) times a day, Historical Med      acetaminophen (TYLENOL) 325 mg tablet Take 325 mg by mouth continuous as needed, Historical Med      Atropine Sulfate 0.25 MG/5ML SOSY Take 0.25 mg by mouth 3 (three) times a day as needed, Historical Med      Cyanocobalamin 1000 MCG SUBL Place 1,000 mcg under the tongue daily 4 times a week, Starting Thu 6/9/2011, Historical Med      Insulin Aspart (NovoLOG) 100 units/mL injection Inject 14 Units under the skin 3 (three) times a day with meals, Historical Med      insulin glargine (LANTUS) 100 units/mL subcutaneous injection  Inject 40 Units under the skin daily at bedtime, Historical Med      oxybutynin (DITROPAN) 5 mg tablet Take 5 mg by mouth, Starting Wed 6/12/2024, Historical Med      pantoprazole (PROTONIX) 40 mg tablet Take 1 tablet (40 mg total) by mouth daily in the early morning, Starting Sun 5/31/2020, Normal      pyridostigmine (MESTINON) 60 mg tablet Take 60 mg by mouth 4 (four) times a day , Historical Med      rivaroxaban (XARELTO) 20 mg tablet Take 20 mg by mouth, Historical Med      saxagliptin (ONGLYZA) 5 MG tablet Take 5 mg by mouth daily, Historical Med      sildenafil (VIAGRA) 100 mg tablet Take 100 mg by mouth as needed , Historical Med      simvastatin (ZOCOR) 20 mg tablet Take 10 mg by mouth daily Ordered as 20mg pt takes half a tab, Historical Med           No discharge procedures on file.     Huong Delong, DO  09/20/24 2143

## 2024-09-19 NOTE — ASSESSMENT & PLAN NOTE
Patient brought to the ED at the request of Adult Protective Services as they are concerned for his ability to care for himself at home.  He was covered in his urine and feces.  Consult PT/OT/case management  Will consult neuropsych for capacity evaluation

## 2024-09-19 NOTE — ASSESSMENT & PLAN NOTE
Lab Results   Component Value Date    HGBA1C 15.9 (H) 09/01/2024       Recent Labs     09/19/24  1618 09/19/24  2120   POCGLU 476* 480*       Blood Sugar Average: Last 72 hrs:  (P) 478  Patient with blood sugar of 428 without acidosis or anion gap elevation  Hyperglycemia likely in the setting of noncompliance   He reported his blood sugars were in the 800s at home  Resume Lantus 44 units QHS and lispro 12 units 3 times daily with meals sliding scale   Have given IV insulin, recheck at 2 AM to ensure downtrend

## 2024-09-19 NOTE — ASSESSMENT & PLAN NOTE
Likely secondary to noncompliance  Found at home covered in feces and urine.  Adult Protective Services is involved and concern for his ability to care for himself at home  Giving 7 units IV insulin now   Resume home insulin regimen: Glargine 20 units QHS with sliding scale with meals

## 2024-09-20 VITALS
OXYGEN SATURATION: 95 % | RESPIRATION RATE: 18 BRPM | BODY MASS INDEX: 24.09 KG/M2 | DIASTOLIC BLOOD PRESSURE: 54 MMHG | SYSTOLIC BLOOD PRESSURE: 97 MMHG | HEART RATE: 78 BPM | HEIGHT: 66 IN | WEIGHT: 149.91 LBS | TEMPERATURE: 97.9 F

## 2024-09-20 LAB
ALBUMIN SERPL BCG-MCNC: 3 G/DL (ref 3.5–5)
ALP SERPL-CCNC: 86 U/L (ref 34–104)
ALT SERPL W P-5'-P-CCNC: 13 U/L (ref 7–52)
ANION GAP SERPL CALCULATED.3IONS-SCNC: 6 MMOL/L (ref 4–13)
AST SERPL W P-5'-P-CCNC: 56 U/L (ref 13–39)
BASOPHILS # BLD AUTO: 0.07 THOUSANDS/ΜL (ref 0–0.1)
BASOPHILS NFR BLD AUTO: 1 % (ref 0–1)
BILIRUB SERPL-MCNC: 0.4 MG/DL (ref 0.2–1)
BUN SERPL-MCNC: 29 MG/DL (ref 5–25)
CALCIUM ALBUM COR SERPL-MCNC: 9 MG/DL (ref 8.3–10.1)
CALCIUM SERPL-MCNC: 8.2 MG/DL (ref 8.4–10.2)
CHLORIDE SERPL-SCNC: 100 MMOL/L (ref 96–108)
CO2 SERPL-SCNC: 28 MMOL/L (ref 21–32)
CREAT SERPL-MCNC: 0.8 MG/DL (ref 0.6–1.3)
EOSINOPHIL # BLD AUTO: 0.17 THOUSAND/ΜL (ref 0–0.61)
EOSINOPHIL NFR BLD AUTO: 2 % (ref 0–6)
ERYTHROCYTE [DISTWIDTH] IN BLOOD BY AUTOMATED COUNT: 13.6 % (ref 11.6–15.1)
GFR SERPL CREATININE-BSD FRML MDRD: 87 ML/MIN/1.73SQ M
GLUCOSE SERPL-MCNC: 225 MG/DL (ref 65–140)
GLUCOSE SERPL-MCNC: 274 MG/DL (ref 65–140)
GLUCOSE SERPL-MCNC: 284 MG/DL (ref 65–140)
HCT VFR BLD AUTO: 40.2 % (ref 36.5–49.3)
HGB BLD-MCNC: 13.6 G/DL (ref 12–17)
IMM GRANULOCYTES # BLD AUTO: 0.14 THOUSAND/UL (ref 0–0.2)
IMM GRANULOCYTES NFR BLD AUTO: 2 % (ref 0–2)
LYMPHOCYTES # BLD AUTO: 2.57 THOUSANDS/ΜL (ref 0.6–4.47)
LYMPHOCYTES NFR BLD AUTO: 36 % (ref 14–44)
MAGNESIUM SERPL-MCNC: 2.1 MG/DL (ref 1.9–2.7)
MCH RBC QN AUTO: 31.8 PG (ref 26.8–34.3)
MCHC RBC AUTO-ENTMCNC: 33.8 G/DL (ref 31.4–37.4)
MCV RBC AUTO: 94 FL (ref 82–98)
MONOCYTES # BLD AUTO: 0.58 THOUSAND/ΜL (ref 0.17–1.22)
MONOCYTES NFR BLD AUTO: 8 % (ref 4–12)
NEUTROPHILS # BLD AUTO: 3.55 THOUSANDS/ΜL (ref 1.85–7.62)
NEUTS SEG NFR BLD AUTO: 51 % (ref 43–75)
NRBC BLD AUTO-RTO: 0 /100 WBCS
PLATELET # BLD AUTO: 318 THOUSANDS/UL (ref 149–390)
PMV BLD AUTO: 9.3 FL (ref 8.9–12.7)
POTASSIUM SERPL-SCNC: 4.2 MMOL/L (ref 3.5–5.3)
PROT SERPL-MCNC: 6.3 G/DL (ref 6.4–8.4)
RBC # BLD AUTO: 4.28 MILLION/UL (ref 3.88–5.62)
SODIUM SERPL-SCNC: 134 MMOL/L (ref 135–147)
T4 FREE SERPL-MCNC: <0.25 NG/DL (ref 0.61–1.12)
TSH SERPL DL<=0.05 MIU/L-ACNC: 41.08 UIU/ML (ref 0.45–4.5)
WBC # BLD AUTO: 7.08 THOUSAND/UL (ref 4.31–10.16)

## 2024-09-20 PROCEDURE — 99239 HOSP IP/OBS DSCHRG MGMT >30: CPT | Performed by: NURSE PRACTITIONER

## 2024-09-20 PROCEDURE — 80053 COMPREHEN METABOLIC PANEL: CPT | Performed by: STUDENT IN AN ORGANIZED HEALTH CARE EDUCATION/TRAINING PROGRAM

## 2024-09-20 PROCEDURE — 82948 REAGENT STRIP/BLOOD GLUCOSE: CPT

## 2024-09-20 PROCEDURE — 84439 ASSAY OF FREE THYROXINE: CPT | Performed by: STUDENT IN AN ORGANIZED HEALTH CARE EDUCATION/TRAINING PROGRAM

## 2024-09-20 PROCEDURE — 83735 ASSAY OF MAGNESIUM: CPT | Performed by: STUDENT IN AN ORGANIZED HEALTH CARE EDUCATION/TRAINING PROGRAM

## 2024-09-20 PROCEDURE — 84443 ASSAY THYROID STIM HORMONE: CPT | Performed by: STUDENT IN AN ORGANIZED HEALTH CARE EDUCATION/TRAINING PROGRAM

## 2024-09-20 PROCEDURE — 85025 COMPLETE CBC W/AUTO DIFF WBC: CPT | Performed by: STUDENT IN AN ORGANIZED HEALTH CARE EDUCATION/TRAINING PROGRAM

## 2024-09-20 RX ADMIN — NICOTINE 1 PATCH: 21 PATCH, EXTENDED RELEASE TRANSDERMAL at 09:22

## 2024-09-20 RX ADMIN — RIVAROXABAN 20 MG: 20 TABLET, FILM COATED ORAL at 09:22

## 2024-09-20 RX ADMIN — CARBIDOPA AND LEVODOPA 1 TABLET: 25; 100 TABLET ORAL at 09:22

## 2024-09-20 RX ADMIN — LEVOTHYROXINE SODIUM 112 MCG: 112 TABLET ORAL at 05:09

## 2024-09-20 RX ADMIN — INSULIN LISPRO 12 UNITS: 100 INJECTION, SOLUTION INTRAVENOUS; SUBCUTANEOUS at 09:24

## 2024-09-20 RX ADMIN — INSULIN LISPRO 3 UNITS: 100 INJECTION, SOLUTION INTRAVENOUS; SUBCUTANEOUS at 09:23

## 2024-09-20 RX ADMIN — PYRIDOSTIGMINE BROMIDE 60 MG: 60 TABLET ORAL at 09:25

## 2024-09-20 NOTE — ASSESSMENT & PLAN NOTE
"Lab Results   Component Value Date    HGBA1C 15.9 (H) 09/01/2024       Recent Labs     09/19/24  1618 09/19/24  2120 09/20/24  0511 09/20/24  0727   POCGLU 476* 480* 284* 274*       Blood Sugar Average: Last 72 hrs:  (P) 378.5  Patient with blood sugar of 428 without acidosis or anion gap elevation  Hyperglycemia likely in the setting of noncompliance   He reported his blood sugars were in the 800s at home  Resume Lantus 44 units QHS and lispro 12 units 3 times daily with meals sliding scale   Have given IV insulin, recheck at 2 AM to ensure downtrend    ** states he just stopped taking his insulin and when I asked why he just started cussing at me and told me to \"leave him the fuck alone\"  "

## 2024-09-20 NOTE — DISCHARGE SUMMARY
"Discharge Summary - Hospitalist   Name: Kalpesh Haro 73 y.o. male I MRN: [id number]  Unit/Bed#: 2 E 270-01 I Date of Admission: 9/19/2024   Date of Service: 9/20/2024 I Hospital Day: 1         LEFT AMA  Assessment & Plan  Type 2 diabetes mellitus with hyperglycemia, with long-term current use of insulin (HCC)  Lab Results   Component Value Date    HGBA1C 15.9 (H) 09/01/2024       Recent Labs     09/19/24  1618 09/19/24  2120 09/20/24  0511 09/20/24  0727   POCGLU 476* 480* 284* 274*       Blood Sugar Average: Last 72 hrs:  (P) 378.5  Patient with blood sugar of 428 without acidosis or anion gap elevation  Hyperglycemia likely in the setting of noncompliance   He reported his blood sugars were in the 800s at home  Resume Lantus 44 units QHS and lispro 12 units 3 times daily with meals sliding scale   Have given IV insulin, recheck at 2 AM to ensure downtrend    ** states he just stopped taking his insulin and when I asked why he just started cussing at me and told me to \"leave him the fuck alone\"  Parkinson's disease  Continue Sinemet  AAOX3  Personal history of DVT and PE  Continue Xarelto  Hypothyroidism  Continue Synthroid  Myasthenia gravis (HCC)  Continue pyridostigmine  Failure to thrive in adult  Patient brought to the ED at the request of Adult Protective Services as they are concerned for his ability to care for himself at home.  He was covered in his urine and feces.  Consult PT/OT/case management  Will consult neuropsych for capacity evaluation  ** refusing eval and wants to leave AMA**  Candidiasis  Noted in the genital area   Keep area clean  Nystatin ordered  Wound care consult    Discharging Physician / Practitioner: FLORENTINO Dorsey  PCP: Maurizio Bonilla MD  Admission Date:   Admission Orders (From admission, onward)       Ordered        09/19/24 1824  INPATIENT ADMISSION  Once                          Discharge Date: 09/20/24    Medical Problems       Resolved Problems  Date Reviewed: " 9/1/2024   None         Consultations During Hospital Stay:  IP CONSULT TO CASE MANAGEMENT  IP CONSULT TO NEUROPSYCHOLOGY    Procedures Performed:   CT head without contrast    Result Date: 9/19/2024  No acute intracranial abnormality. Workstation performed: [id number]     Significant Findings / Test Results:   Above    Incidental Findings:   None    Test Results Pending at Discharge (will require follow up):   None     Outpatient Tests Requested:  None    Complications: Left AMA    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)     Prostate cancer (HCC)     Thyroid cancer (HCC)        Reason for Admission: Weakness - Generalized (Pt. Presents to ER via EMS from home with multiple generalized complaints including difficultly ambulating, dry mouth, and diaper rash.  )       Hospital Course:     Kalpesh Haro is a 73 y.o. male patient with past medical history of above who originally presented to the hospital on 9/19/2024 due to Weakness - Generalized (Pt. Presents to ER via EMS from home with multiple generalized complaints including difficultly ambulating, dry mouth, and diaper rash.  ) patient appears to be alert and oriented x 3 he can tell me where he is why he is in the hospital he is adamant about leaving the hospital right now he reports that he just stopped taking his insulin he did not run out of his insulin or any of his supplies says that he will take his insulin at home and that he needs to leave right now so that he can go check on his garden    Please see above list of diagnoses and related plan for additional information.     Condition at Discharge: poor     Discharge Day Visit / Exam:     Subjective: Denies any chest pain or chest tightness tolerating his meals adamant about leaving the hospital right now      Vitals: Blood Pressure: 97/54 (09/20/24 0727)  Pulse: 78 (09/19/24  "2126)  Temperature: 97.9 °F (36.6 °C) (09/20/24 0727)  Temp Source: Oral (09/19/24 1519)  Respirations: 18 (09/19/24 2126)  Height: 5' 5.98\" (167.6 cm) (09/20/24 0900)  Weight - Scale: 68 kg (149 lb 14.6 oz) (09/20/24 0900)  SpO2: 95 % (09/19/24 2126)  Exam:   Physical Exam  Vitals and nursing note reviewed.   Constitutional:       General: He is not in acute distress.     Appearance: He is not ill-appearing.   Cardiovascular:      Rate and Rhythm: Normal rate.   Pulmonary:      Effort: No respiratory distress.   Skin:     Coloration: Skin is pale.   Neurological:      Mental Status: He is alert. Mental status is at baseline.   Psychiatric:         Mood and Affect: Mood normal.         Discussion with Family: Not available     Discharge instructions/Information to patient and family:   See after visit summary for information provided to patient and family.      Provisions for Follow-Up Care:  See after visit summary for information related to follow-up care and any pertinent home health orders.      Disposition:     Home    Planned Readmission: no     Discharge Statement:  I spent 45 minutes discharging the patient. This time was spent on the day of discharge. I had direct contact with the patient on the day of discharge. Greater than 50% of the total time was spent examining patient, answering all patient questions, arranging and discussing plan of care with patient as well as directly providing post-discharge instructions.  Additional time then spent on discharge activities.    Discharge Medications:  See after visit summary for reconciled discharge medications provided to patient and family.      ** Please Note: This note has been constructed using a voice recognition system **    "

## 2024-09-20 NOTE — H&P
H&P - Hospitalist   Name: Kalpesh Haro 73 y.o. male I MRN: [id number]  Unit/Bed#: 2 E 270-01 I Date of Admission: 9/19/2024   Date of Service: 9/19/2024 I Hospital Day: 0     Assessment & Plan  Type 2 diabetes mellitus with hyperglycemia, with long-term current use of insulin (HCC)  Lab Results   Component Value Date    HGBA1C 15.9 (H) 09/01/2024       Recent Labs     09/19/24  1618 09/19/24  2120   POCGLU 476* 480*       Blood Sugar Average: Last 72 hrs:  (P) 478  Patient with blood sugar of 428 without acidosis or anion gap elevation  Hyperglycemia likely in the setting of noncompliance   He reported his blood sugars were in the 800s at home  Resume Lantus 44 units QHS and lispro 12 units 3 times daily with meals sliding scale   Have given IV insulin, recheck at 2 AM to ensure downtrend  Parkinson's disease  Continue Sinemet  Personal history of DVT and PE  Continue Xarelto  Hypothyroidism  Continue Synthroid  Myasthenia gravis (HCC)  Continue pyridostigmine  Failure to thrive in adult  Patient brought to the ED at the request of Adult Protective Services as they are concerned for his ability to care for himself at home.  He was covered in his urine and feces.  Consult PT/OT/case management  Will consult neuropsych for capacity evaluation  Candidiasis  Noted in the genital area   Keep area clean  Nystatin ordered  Wound care consult    VTE Pharmacologic Prophylaxis: VTE Score: 4 Moderate Risk (Score 3-4) - Pharmacological DVT Prophylaxis Ordered: rivaroxaban (Xarelto).  Code Status: Level 1 - Full Code dw pt   Discussion with family: Patient declined call to .     Anticipated Length of Stay: Patient will be admitted on an inpatient basis with an anticipated length of stay of greater than 2 midnights secondary to hyperglycemia.    History of Present Illness   Chief Complaint: elevated BG to 800s at home    Kalpesh Haro is a 73 y.o. male with a PMH of diabetes, myasthenia, Parkinson's who presents  "with uncontrolled diabetes.  He reported he was having elevated blood sugars up to 800s at home with increased urination.  ED provider discussed with APS who was concerned for his ability to take care of himself at home.  Also were questioning his capacity.  During my evaluation patient is aware of his medical conditions and medications that he supposed to take but does admit that he does not take them every day.  He denies any fevers, chills, shortness of breath, chest pain.    Review of Systems  10 pt review of systems reviewed with patient and pertinent positive/negatives as per HPI.    I have reviewed the patient's PMH, PSH, Social History, Family History, Meds, and Allergies  Social History:  Marital Status: Single   Occupation: none  Patient Pre-hospital Living Situation: Home  Patient Pre-hospital Level of Mobility: unable to be assessed at time of evaluation  Patient Pre-hospital Diet Restrictions: none    Objective     Vitals:   Blood Pressure: 100/58 (09/19/24 2126)  Pulse: 78 (09/19/24 2126)  Temperature: 97.6 °F (36.4 °C) (09/19/24 2126)  Temp Source: Oral (09/19/24 1519)  Respirations: 18 (09/19/24 2126)  Height: 5' 6\" (167.6 cm) (09/19/24 1519)  Weight - Scale: 68 kg (149 lb 14.6 oz) (09/19/24 1519)  SpO2: 95 % (09/19/24 2126)    Physical Exam  NAD  Nonlabored resp on RA  RRR  NTND abd   aaox3  Lines/Drains:  Lines/Drains/Airways       Active Status       None                        Additional Data:   Lab Results: I have reviewed the following results: CBC/BMP:   .     09/19/24  1626   WBC 7.22   HGB 14.5   HCT 43.1      SODIUM 131*   K 4.4   CL 93*   CO2 28   BUN 30*   CREATININE 0.92   GLUC 428*    , Creatinine Clearance: Estimated Creatinine Clearance: 62.6 mL/min (by C-G formula based on SCr of 0.92 mg/dL).  Results from last 7 days   Lab Units 09/19/24  1626   WBC Thousand/uL 7.22   HEMOGLOBIN g/dL 14.5   HEMATOCRIT % 43.1   PLATELETS Thousands/uL 320   SEGS PCT % 60   LYMPHO PCT % 29 "   MONO PCT % 7   EOS PCT % 1     Results from last 7 days   Lab Units 09/19/24  1626   SODIUM mmol/L 131*   POTASSIUM mmol/L 4.4   CHLORIDE mmol/L 93*   CO2 mmol/L 28   BUN mg/dL 30*   CREATININE mg/dL 0.92   ANION GAP mmol/L 10   CALCIUM mg/dL 8.5   ALBUMIN g/dL 3.4*   TOTAL BILIRUBIN mg/dL 0.66   ALK PHOS U/L 97   ALT U/L 82*   AST U/L 77*   GLUCOSE RANDOM mg/dL 428*         Results from last 7 days   Lab Units 09/19/24  2120 09/19/24  1618   POC GLUCOSE mg/dl 480* 476*     Lab Results   Component Value Date    HGBA1C 15.9 (H) 09/01/2024    HGBA1C 12.8 (H) 08/14/2023    HGBA1C 8.7 (H) 05/28/2020           Imaging Review: Reviewed radiology reports from this admission including: CT head.  Other Studies: No additional pertinent studies reviewed.    Administrative Statements   I have spent a total time of 40+ minutes in caring for this patient on the day of the visit/encounter including Diagnostic results, Risks and benefits of tx options, and Instructions for management.    ** Please Note: This note has been constructed using a voice recognition system. **

## 2024-09-20 NOTE — ASSESSMENT & PLAN NOTE
Patient brought to the ED at the request of Adult Protective Services as they are concerned for his ability to care for himself at home.  He was covered in his urine and feces.  Consult PT/OT/case management  Will consult neuropsych for capacity evaluation  ** refusing eval and wants to leave AMA**

## 2024-09-20 NOTE — NURSING NOTE
Patient asking to leave and repeatedly getting out of chair. Restless and using profanity. States he needs to smoke cigarettes and go home. States friend is on his way to get him. Patient found smoking in room and put cigarette out in coffee. Haleigh Johnson notified. Security made aware and to floor to speak with patient. Charge RN made aware. Haleigh Johnson to room. Patient signed AMA form. Patient assisted to lobby to wait for transport by friend.

## 2024-09-20 NOTE — WOUND OSTOMY CARE
Consult Note - Wound   Kalpesh Haro 73 y.o. male MRN: [id number]  Unit/Bed#: 2 E 270-01 Encounter: [id number]        History and Present Illness:  Patient is a 74 yo male that was admitted to [facility]  for treatment of Type 2 diabetes mellitus with hyperglycemia, with long-term current use of insulin. Patient has a PMH of diabetes, myasthenia, Parkinson's. Patient is alert and oriented times three and agreeable to assessment. Patient is independent for turning and repositioning, assist for care, independent with meals. Patient states he is continent; however on assessment patient with episode of large urinary incontinence. On assessment, patient is in bed, with pillows in place for offloading, right hand with slight tremor.     Wound Care was consulted for groin rash.     Assessment Findings:   B/L heels are dry intact and jose with no skin loss or wounds present. Recommend preventative Hydraguard Cream and proper offloading/ repositioning.      B/L Buttocks, Groin, Perineum MASD - large irregular shaped area of intact erythematous skin. No open aspects or drainage noted. Virgie wound with scattered intact satelltie lesions. Suspect etiology of moisture related to incontinence, with possible fungal component. Per ED note, patient was saturated in urine and feces on presentation to the emergency department; skin breakdown consistent with prolonged exposure to moisture. Recommend Marya antifungal cream.     No induration, fluctuance, odor, warmth/temperature differences, redness, or purulence noted to the above noted wounds and skin areas assessed. New dressings applied per orders listed below. Patient tolerated well- no s/s of non-verbal pain or discomfort observed during the encounter. Bedside nurse aware of plan of care. See flow sheets for more detailed assessment findings.      Orders listed below and wound care will continue to follow, call or Secure Chat with questions.     Skin care plans:  1-Hydraguard to  bilateral heels BID and PRN  2-Elevate heels to offload pressure.  3-Ehob cushion in chair when out of bed.  4-Moisturize skin daily with skin nourishing cream.  5-Turn/reposition q2h for pressure re-distribution on skin.  6-Wash Sacro Buttocks, groin, and periunium with soap and water. Pat Dry. Apply Marya antifungal cream BID.     Wounds:  Wound 09/20/24 MASD Groin Bilateral (Active)   Wound Image    09/20/24 0839   Wound Description Beefy red;Drainage;Fragile 09/20/24 0839   Virgie-wound Assessment Rash;Scaly;Erythema 09/20/24 0839   Wound Length (cm) 36 cm 09/20/24 0839   Wound Width (cm) 24 cm 09/20/24 0839   Wound Depth (cm) 0.1 cm 09/20/24 0839   Wound Surface Area (cm^2) 864 cm^2 09/20/24 0839   Wound Volume (cm^3) 86.4 cm^3 09/20/24 0839   Calculated Wound Volume (cm^3) 86.4 cm^3 09/20/24 0839   Drainage Amount None 09/20/24 0839   Non-staged Wound Description Partial thickness 09/20/24 0839   Treatments Cleansed;Site care 09/20/24 0839   Dressing Protective barrier;Moisture barrier 09/20/24 0839   Wound packed? No 09/20/24 0839   Packing- # removed 0 09/20/24 0839   Packing- # inserted 0 09/20/24 0839   Patient Tolerance Tolerated well 09/20/24 0839               Denisse Oliver RN, BSN, CCRN

## 2024-10-01 PROBLEM — J18.9 PNEUMONIA OF RIGHT UPPER LOBE DUE TO INFECTIOUS ORGANISM: Status: RESOLVED | Noted: 2024-09-01 | Resolved: 2024-10-01

## 2024-10-01 PROBLEM — A41.9 SEPSIS (HCC): Status: RESOLVED | Noted: 2024-09-01 | Resolved: 2024-10-01

## 2025-02-03 ENCOUNTER — APPOINTMENT (EMERGENCY)
Dept: CT IMAGING | Facility: HOSPITAL | Age: 75
DRG: 641 | End: 2025-02-03
Payer: COMMERCIAL

## 2025-02-03 ENCOUNTER — HOSPITAL ENCOUNTER (INPATIENT)
Facility: HOSPITAL | Age: 75
LOS: 1 days | Discharge: LEFT BEFORE DISCHARGE | DRG: 641 | End: 2025-02-04
Attending: EMERGENCY MEDICINE | Admitting: INTERNAL MEDICINE
Payer: COMMERCIAL

## 2025-02-03 DIAGNOSIS — Z60.9 POOR SOCIAL SITUATION: ICD-10-CM

## 2025-02-03 DIAGNOSIS — R73.9 HYPERGLYCEMIA: Primary | ICD-10-CM

## 2025-02-03 DIAGNOSIS — R41.82 ALTERED MENTAL STATUS: ICD-10-CM

## 2025-02-03 DIAGNOSIS — R62.7 FAILURE TO THRIVE IN ADULT: ICD-10-CM

## 2025-02-03 LAB
ALBUMIN SERPL BCG-MCNC: 3.7 G/DL (ref 3.5–5)
ALP SERPL-CCNC: 80 U/L (ref 34–104)
ALT SERPL W P-5'-P-CCNC: 49 U/L (ref 7–52)
ANION GAP SERPL CALCULATED.3IONS-SCNC: 6 MMOL/L (ref 4–13)
APAP SERPL-MCNC: <2 UG/ML (ref 10–20)
AST SERPL W P-5'-P-CCNC: 28 U/L (ref 13–39)
BASOPHILS # BLD AUTO: 0.07 THOUSANDS/ÂΜL (ref 0–0.1)
BASOPHILS NFR BLD AUTO: 1 % (ref 0–1)
BILIRUB SERPL-MCNC: 0.52 MG/DL (ref 0.2–1)
BUN SERPL-MCNC: 25 MG/DL (ref 5–25)
CALCIUM SERPL-MCNC: 10 MG/DL (ref 8.4–10.2)
CHLORIDE SERPL-SCNC: 98 MMOL/L (ref 96–108)
CO2 SERPL-SCNC: 31 MMOL/L (ref 21–32)
CREAT SERPL-MCNC: 1.24 MG/DL (ref 0.6–1.3)
EOSINOPHIL # BLD AUTO: 0.17 THOUSAND/ÂΜL (ref 0–0.61)
EOSINOPHIL NFR BLD AUTO: 3 % (ref 0–6)
ERYTHROCYTE [DISTWIDTH] IN BLOOD BY AUTOMATED COUNT: 14.3 % (ref 11.6–15.1)
EST. AVERAGE GLUCOSE BLD GHB EST-MCNC: 324 MG/DL
ETHANOL SERPL-MCNC: <10 MG/DL
GFR SERPL CREATININE-BSD FRML MDRD: 56 ML/MIN/1.73SQ M
GLUCOSE SERPL-MCNC: 212 MG/DL (ref 65–140)
GLUCOSE SERPL-MCNC: 221 MG/DL (ref 65–140)
GLUCOSE SERPL-MCNC: 228 MG/DL (ref 65–140)
GLUCOSE SERPL-MCNC: 326 MG/DL (ref 65–140)
GLUCOSE SERPL-MCNC: 398 MG/DL (ref 65–140)
HBA1C MFR BLD: 12.9 %
HCT VFR BLD AUTO: 47.5 % (ref 36.5–49.3)
HGB BLD-MCNC: 16.5 G/DL (ref 12–17)
IMM GRANULOCYTES # BLD AUTO: 0.04 THOUSAND/UL (ref 0–0.2)
IMM GRANULOCYTES NFR BLD AUTO: 1 % (ref 0–2)
LACTATE SERPL-SCNC: 1.6 MMOL/L (ref 0.5–2)
LYMPHOCYTES # BLD AUTO: 1.92 THOUSANDS/ÂΜL (ref 0.6–4.47)
LYMPHOCYTES NFR BLD AUTO: 29 % (ref 14–44)
MCH RBC QN AUTO: 31.5 PG (ref 26.8–34.3)
MCHC RBC AUTO-ENTMCNC: 34.7 G/DL (ref 31.4–37.4)
MCV RBC AUTO: 91 FL (ref 82–98)
MONOCYTES # BLD AUTO: 0.4 THOUSAND/ÂΜL (ref 0.17–1.22)
MONOCYTES NFR BLD AUTO: 6 % (ref 4–12)
NEUTROPHILS # BLD AUTO: 4.08 THOUSANDS/ÂΜL (ref 1.85–7.62)
NEUTS SEG NFR BLD AUTO: 60 % (ref 43–75)
NRBC BLD AUTO-RTO: 0 /100 WBCS
PLATELET # BLD AUTO: 266 THOUSANDS/UL (ref 149–390)
PMV BLD AUTO: 9 FL (ref 8.9–12.7)
POTASSIUM SERPL-SCNC: 5 MMOL/L (ref 3.5–5.3)
PROCALCITONIN SERPL-MCNC: <0.05 NG/ML
PROT SERPL-MCNC: 6.6 G/DL (ref 6.4–8.4)
RBC # BLD AUTO: 5.24 MILLION/UL (ref 3.88–5.62)
SALICYLATES SERPL-MCNC: <5 MG/DL (ref 3–20)
SODIUM SERPL-SCNC: 135 MMOL/L (ref 135–147)
WBC # BLD AUTO: 6.68 THOUSAND/UL (ref 4.31–10.16)

## 2025-02-03 PROCEDURE — 83605 ASSAY OF LACTIC ACID: CPT

## 2025-02-03 PROCEDURE — 99285 EMERGENCY DEPT VISIT HI MDM: CPT

## 2025-02-03 PROCEDURE — 82948 REAGENT STRIP/BLOOD GLUCOSE: CPT

## 2025-02-03 PROCEDURE — 87154 CUL TYP ID BLD PTHGN 6+ TRGT: CPT

## 2025-02-03 PROCEDURE — 99285 EMERGENCY DEPT VISIT HI MDM: CPT | Performed by: EMERGENCY MEDICINE

## 2025-02-03 PROCEDURE — 80179 DRUG ASSAY SALICYLATE: CPT | Performed by: EMERGENCY MEDICINE

## 2025-02-03 PROCEDURE — 84145 PROCALCITONIN (PCT): CPT

## 2025-02-03 PROCEDURE — 80143 DRUG ASSAY ACETAMINOPHEN: CPT | Performed by: EMERGENCY MEDICINE

## 2025-02-03 PROCEDURE — 80053 COMPREHEN METABOLIC PANEL: CPT | Performed by: EMERGENCY MEDICINE

## 2025-02-03 PROCEDURE — 87077 CULTURE AEROBIC IDENTIFY: CPT

## 2025-02-03 PROCEDURE — 85025 COMPLETE CBC W/AUTO DIFF WBC: CPT | Performed by: EMERGENCY MEDICINE

## 2025-02-03 PROCEDURE — 70450 CT HEAD/BRAIN W/O DYE: CPT

## 2025-02-03 PROCEDURE — 99223 1ST HOSP IP/OBS HIGH 75: CPT

## 2025-02-03 PROCEDURE — 87040 BLOOD CULTURE FOR BACTERIA: CPT

## 2025-02-03 PROCEDURE — 83036 HEMOGLOBIN GLYCOSYLATED A1C: CPT | Performed by: PHYSICIAN ASSISTANT

## 2025-02-03 PROCEDURE — 36415 COLL VENOUS BLD VENIPUNCTURE: CPT | Performed by: EMERGENCY MEDICINE

## 2025-02-03 PROCEDURE — 82077 ASSAY SPEC XCP UR&BREATH IA: CPT | Performed by: EMERGENCY MEDICINE

## 2025-02-03 RX ORDER — INSULIN LISPRO 100 [IU]/ML
1-5 INJECTION, SOLUTION INTRAVENOUS; SUBCUTANEOUS
Status: DISCONTINUED | OUTPATIENT
Start: 2025-02-03 | End: 2025-02-04 | Stop reason: HOSPADM

## 2025-02-03 RX ORDER — NICOTINE 21 MG/24HR
1 PATCH, TRANSDERMAL 24 HOURS TRANSDERMAL DAILY
Status: DISCONTINUED | OUTPATIENT
Start: 2025-02-03 | End: 2025-02-04 | Stop reason: HOSPADM

## 2025-02-03 RX ORDER — INSULIN LISPRO 100 [IU]/ML
16 INJECTION, SOLUTION INTRAVENOUS; SUBCUTANEOUS
Status: DISCONTINUED | OUTPATIENT
Start: 2025-02-03 | End: 2025-02-04

## 2025-02-03 RX ORDER — ALBUMIN (HUMAN) 12.5 G/50ML
SOLUTION INTRAVENOUS
Status: DISPENSED
Start: 2025-02-03 | End: 2025-02-03

## 2025-02-03 RX ORDER — ACETAMINOPHEN 325 MG/1
650 TABLET ORAL EVERY 6 HOURS PRN
Status: DISCONTINUED | OUTPATIENT
Start: 2025-02-03 | End: 2025-02-04 | Stop reason: HOSPADM

## 2025-02-03 RX ORDER — INSULIN GLARGINE 100 [IU]/ML
14 INJECTION, SOLUTION SUBCUTANEOUS
Status: DISCONTINUED | OUTPATIENT
Start: 2025-02-03 | End: 2025-02-03

## 2025-02-03 RX ORDER — CARBIDOPA AND LEVODOPA 25; 100 MG/1; MG/1
2 TABLET ORAL 3 TIMES DAILY
Status: DISCONTINUED | OUTPATIENT
Start: 2025-02-03 | End: 2025-02-04 | Stop reason: HOSPADM

## 2025-02-03 RX ORDER — ALBUMIN (HUMAN) 12.5 G/50ML
25 SOLUTION INTRAVENOUS ONCE
Status: COMPLETED | OUTPATIENT
Start: 2025-02-03 | End: 2025-02-03

## 2025-02-03 RX ORDER — SODIUM CHLORIDE, SODIUM GLUCONATE, SODIUM ACETATE, POTASSIUM CHLORIDE, MAGNESIUM CHLORIDE, SODIUM PHOSPHATE, DIBASIC, AND POTASSIUM PHOSPHATE .53; .5; .37; .037; .03; .012; .00082 G/100ML; G/100ML; G/100ML; G/100ML; G/100ML; G/100ML; G/100ML
125 INJECTION, SOLUTION INTRAVENOUS CONTINUOUS
Status: DISPENSED | OUTPATIENT
Start: 2025-02-03 | End: 2025-02-03

## 2025-02-03 RX ORDER — SODIUM CHLORIDE, SODIUM GLUCONATE, SODIUM ACETATE, POTASSIUM CHLORIDE, MAGNESIUM CHLORIDE, SODIUM PHOSPHATE, DIBASIC, AND POTASSIUM PHOSPHATE .53; .5; .37; .037; .03; .012; .00082 G/100ML; G/100ML; G/100ML; G/100ML; G/100ML; G/100ML; G/100ML
125 INJECTION, SOLUTION INTRAVENOUS CONTINUOUS
Status: DISCONTINUED | OUTPATIENT
Start: 2025-02-03 | End: 2025-02-03

## 2025-02-03 RX ORDER — MAGNESIUM HYDROXIDE/ALUMINUM HYDROXICE/SIMETHICONE 120; 1200; 1200 MG/30ML; MG/30ML; MG/30ML
30 SUSPENSION ORAL EVERY 6 HOURS PRN
Status: DISCONTINUED | OUTPATIENT
Start: 2025-02-03 | End: 2025-02-04 | Stop reason: HOSPADM

## 2025-02-03 RX ORDER — INSULIN GLARGINE 100 [IU]/ML
40 INJECTION, SOLUTION SUBCUTANEOUS
Status: DISCONTINUED | OUTPATIENT
Start: 2025-02-03 | End: 2025-02-04

## 2025-02-03 RX ORDER — SODIUM CHLORIDE, SODIUM GLUCONATE, SODIUM ACETATE, POTASSIUM CHLORIDE, MAGNESIUM CHLORIDE, SODIUM PHOSPHATE, DIBASIC, AND POTASSIUM PHOSPHATE .53; .5; .37; .037; .03; .012; .00082 G/100ML; G/100ML; G/100ML; G/100ML; G/100ML; G/100ML; G/100ML
125 INJECTION, SOLUTION INTRAVENOUS CONTINUOUS
Status: DISCONTINUED | OUTPATIENT
Start: 2025-02-03 | End: 2025-02-04 | Stop reason: HOSPADM

## 2025-02-03 RX ADMIN — NICOTINE 1 PATCH: 14 PATCH, EXTENDED RELEASE TRANSDERMAL at 08:49

## 2025-02-03 RX ADMIN — INSULIN GLARGINE 40 UNITS: 100 INJECTION, SOLUTION SUBCUTANEOUS at 21:41

## 2025-02-03 RX ADMIN — INSULIN LISPRO 2 UNITS: 100 INJECTION, SOLUTION INTRAVENOUS; SUBCUTANEOUS at 17:53

## 2025-02-03 RX ADMIN — INSULIN LISPRO 16 UNITS: 100 INJECTION, SOLUTION INTRAVENOUS; SUBCUTANEOUS at 17:54

## 2025-02-03 RX ADMIN — INSULIN LISPRO 2 UNITS: 100 INJECTION, SOLUTION INTRAVENOUS; SUBCUTANEOUS at 22:14

## 2025-02-03 RX ADMIN — CARBIDOPA AND LEVODOPA 2 TABLET: 25; 100 TABLET ORAL at 08:49

## 2025-02-03 RX ADMIN — SODIUM CHLORIDE 1000 ML: 0.9 INJECTION, SOLUTION INTRAVENOUS at 08:26

## 2025-02-03 RX ADMIN — SODIUM CHLORIDE 1000 ML: 0.9 INJECTION, SOLUTION INTRAVENOUS at 05:02

## 2025-02-03 RX ADMIN — SODIUM CHLORIDE 1000 ML: 0.9 INJECTION, SOLUTION INTRAVENOUS at 09:50

## 2025-02-03 RX ADMIN — ALBUMIN (HUMAN) 25 G: 0.25 INJECTION, SOLUTION INTRAVENOUS at 23:18

## 2025-02-03 RX ADMIN — CARBIDOPA AND LEVODOPA 2 TABLET: 25; 100 TABLET ORAL at 15:15

## 2025-02-03 RX ADMIN — INSULIN LISPRO 3 UNITS: 100 INJECTION, SOLUTION INTRAVENOUS; SUBCUTANEOUS at 08:35

## 2025-02-03 RX ADMIN — INSULIN LISPRO 2 UNITS: 100 INJECTION, SOLUTION INTRAVENOUS; SUBCUTANEOUS at 12:02

## 2025-02-03 RX ADMIN — SODIUM CHLORIDE, SODIUM GLUCONATE, SODIUM ACETATE, POTASSIUM CHLORIDE, MAGNESIUM CHLORIDE, SODIUM PHOSPHATE, DIBASIC, AND POTASSIUM PHOSPHATE 125 ML/HR: .53; .5; .37; .037; .03; .012; .00082 INJECTION, SOLUTION INTRAVENOUS at 06:54

## 2025-02-03 RX ADMIN — CARBIDOPA AND LEVODOPA 2 TABLET: 25; 100 TABLET ORAL at 21:40

## 2025-02-03 RX ADMIN — SODIUM CHLORIDE, SODIUM GLUCONATE, SODIUM ACETATE, POTASSIUM CHLORIDE, MAGNESIUM CHLORIDE, SODIUM PHOSPHATE, DIBASIC, AND POTASSIUM PHOSPHATE 125 ML/HR: .53; .5; .37; .037; .03; .012; .00082 INJECTION, SOLUTION INTRAVENOUS at 23:21

## 2025-02-03 RX ADMIN — RIVAROXABAN 10 MG: 20 TABLET, FILM COATED ORAL at 08:50

## 2025-02-03 SDOH — SOCIAL STABILITY - SOCIAL INSECURITY: PROBLEM RELATED TO SOCIAL ENVIRONMENT, UNSPECIFIED: Z60.9

## 2025-02-03 NOTE — ASSESSMENT & PLAN NOTE
"Lab Results   Component Value Date    HGBA1C 15.9 (H) 09/01/2024       No results for input(s): \"POCGLU\" in the last 72 hours.    Blood Sugar Average: Last 72 hrs:  Patient not taking medication as prescribed, reporting taking once in a while  Supposed to be on Lantus 40 units nightly, since unknown how or when patient has been taking it we will start with Lantus 14 units nightly  Sliding scale insulin, used to be on 14 units 3 times daily of NovoLog with meals, but unsure when or if patient is taking    "

## 2025-02-03 NOTE — ASSESSMENT & PLAN NOTE
Records from [facility] showing patient supposed be on Xarelto 10 units daily, unknown if patient has been taking correctly  We will start Xarelto 10 units daily

## 2025-02-03 NOTE — CONSULTS
Consultation - Neuropsychology/Psychology Department  Kalpesh Haro 74 y.o. male MRN: [id number]  Unit/Bed#: FT 01 Encounter: [id number]        Reason for Consultation:  Kalpesh Haro is a 74 y.o. year old male who was referred for a Neuropsychological Exam to assess cognitive functioning and comment on capacity to make informed medical decisions.      History of Present Illness  Failure to thrive  Physician Requesting Consult: Brittney Ashraf MD    PROBLEM LIST:  Patient Active Problem List   Diagnosis    Abdominal pain    Type 2 diabetes mellitus with hyperglycemia, with long-term current use of insulin (HCC)    Chronic obstructive lung disease (HCC)    Malignant neoplasm of prostate (HCC)    Myasthenia gravis with exacerbation (HCC)    Mixed hyperlipidemia    Jejunitis    Gastrointestinal hemorrhage with melena    Supratherapeutic INR    Parkinson's disease (HCC)    Acute respiratory failure with hypoxia (HCC)    Diabetes mellitus with hyperglycemia (HCC)    Hyponatremia    Personal history of DVT and PE    Generalized weakness    Hypothyroidism    Myasthenia gravis (HCC)    Tobacco abuse    Failure to thrive in adult    Candidiasis         Historical Information   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)     Prostate cancer (HCC)     Thyroid cancer (HCC)      Past Surgical History:   Procedure Laterality Date    THYROIDECTOMY       Social History   Social History     Substance and Sexual Activity   Alcohol Use Not Currently     Social History     Substance and Sexual Activity   Drug Use Not Currently     Social History     Tobacco Use   Smoking Status Every Day    Current packs/day: 0.50    Average packs/day: 0.5 packs/day for 61.0 years (30.5 ttl pk-yrs)    Types: Cigarettes   Smokeless Tobacco Never     Family History:   Family History   Family history unknown: Yes       Meds/Allergies    current meds:   Current Facility-Administered Medications:     acetaminophen (TYLENOL) tablet 650 mg, Q6H PRN    albumin human (FLEXBUMIN) 25 % injection **ADS Override Pull**,     aluminum-magnesium hydroxide-simethicone (MAALOX) oral suspension 30 mL, Q6H PRN    carbidopa-levodopa (SINEMET)  mg per tablet 2 tablet, TID    insulin glargine (LANTUS) subcutaneous injection 40 Units 0.4 mL, HS    insulin lispro (HumALOG/ADMELOG) 100 units/mL subcutaneous injection 1-5 Units, TID AC    insulin lispro (HumALOG/ADMELOG) 100 units/mL subcutaneous injection 1-5 Units, HS    insulin lispro (HumALOG/ADMELOG) 100 units/mL subcutaneous injection 16 Units, TID With Meals    multi-electrolyte (PLASMALYTE-A/ISOLYTE-S PH 7.4) IV solution, Continuous, Last Rate: 125 mL/hr (02/03/25 0654)    nicotine (NICODERM CQ) 14 mg/24hr TD 24 hr patch 1 patch, Daily    rivaroxaban (XARELTO) tablet 10 mg, Daily With Breakfast    Allergies   Allergen Reactions    Cyclobenzaprine Other (See Comments)    Metformin Diarrhea    Other GI Intolerance    Oxycodone Itching    Apixaban Delirium         Family and Social Support:   No data recorded    Behavioral Observations: Patient was alert, UNABLE to accurately state the year, day/week, day/month, and believes he is presently in [place]. Affect appeared flat and patient denied depressed mood and anxiety; patient provided minimal medical history and states he is able to return home today as there is nothing medically wrong with him at this time.    Cognitive Examination    General Cognitive Functioning MMSE = [id number];     Attention/Concentration Auditory Selective Attention = Average; Auditory Vigilance = Within Normal Limits; Information Processing Speed = Within Normal Limits    Frontal Systems/Executive Functioning Mental Flexibility/Cognitive Control = Impaired; Working Memory = Within Normal Limits Abstract Reasoning = Impaired;  Generative Ability = Impaired,     Language  Functioning Confrontation naming = Within Normal Limits, Phonemic Fluency = Impaired; Semantic Retrieval = Within Normal Limits; Comprehension of Complex Ideational Material = Impaired;  Praxis = Within Normal Limits; Repetition = Within Normal Limits; Basic Reading = Within Normal Limits; Following Commands = Impaired    Memory Functioning Narrative Recall - Short Delay = Impaired; Long Delay Narrative Recall = Impaired;     Visuo-Spatial Abilities Not Assessed    Functional Knowledge  Health & Safety Knowledge = Impaired;     Summary/Impression:  Results of Neuropsychological Exam revealed diffuse cognitive dysfunction and on a measure assessing awareness of personal health status and ability to evaluate health problems, handle medical emergencies and take safety precautions, patient performed in the IMPAIRED range of functioning. During this encounter, patient does not appear to have capacity to make fully informed medical decisions.

## 2025-02-03 NOTE — ASSESSMENT & PLAN NOTE
Roommate called EMS as patient not taking care of himself, not taking medications appropriately not eating appropriately, and patient covered in feces and urine, malodorous  Patient homeless living in a hotel room with a roommate  Currently alert and oriented to person, place.  Disoriented to time  CT head negative  Case management consulted to help with possible placement  Neuropsych eval for competency as patient adamant he wants to go back to his hotel room, but does not appear at this time to be able to care for self

## 2025-02-03 NOTE — ED PROVIDER NOTES
Time reflects when diagnosis was documented in both MDM as applicable and the Disposition within this note       Time User Action Codes Description Comment    2/3/2025  3:31 AM Ruel Myers Add [R73.9] Hyperglycemia     2/3/2025  3:31 AM Ruel Myers Add [Z60.9] Poor social situation     2/3/2025  5:50 AM Fauzia Isaac Add [R41.82] Altered mental status     2/3/2025  5:50 AM Fauzia Isaac [R62.7] Failure to thrive in adult           ED Disposition       ED Disposition   Admit    Condition   Stable    Date/Time   Mon Feb 3, 2025  3:31 AM    Comment   Case was discussed with BRANDON and the patient's admission status was agreed to be Admission Status: inpatient status to the service of Dr. Fernandez               Assessment & Plan       Medical Decision Making  74-year-old male presents to the emergency room after his roommate contacted EMS due to patient's recurrent altered sensorium.  According to EMS, the roommate states the patient has been noncompliant with his medications and poorly taking care of himself.    Upon my evaluation, the patient denies any complaints.  The patient states the year is 2024 but correctly states that it is February.  The patient appropriately states that he is in the hospital.    The patient states he has been compliant with his medications though he is unable to list them.  The [facility] in [facility] history.  Patient states he takes insulin for his diabetes though he is unable to state which types, he does state that he uses a long-acting and short acting medication.  Patient states he has been compliant with his anticoagulation due to his history of venous thromboses.    Patient states he has eaten little in the past day, stating he ate 2 candy bars yesterday.  Patient states he has family but he cannot live with them so he currently lives in a hotel with a roommate whom he states he got involved with an  argument and subsequently contacted EMS.  Patient denies any falls or trauma.  Patient denies any recent illnesses.    Patient was seen in the emergency room multiple times previously for similar concerns and previously had recommended neuropsychiatric evaluation though the patient had refused the evaluation and was subsequently discharged against medical advice.    Impression and plan: Reported altered sensorium though patient is nearly oriented and does have recollection of his medical history.  Patient denies any symptoms or any concerns and states that EMS was contacted due to a disagreement with his roommate.  Patient's inability to state his medications is of some concern considering his reported noncompliance with his medications though he does have overall understanding of his medication.  Patient's poor oral intake is concerning regarding his wellbeing though he does appear fed.  Will check laboratory evaluation to evaluate any signs of diabetic etiology of his symptoms and considering his use of reported anticoagulation will obtain CT imaging of patient's head to evaluate for intracranial etiology though he is not focal at present and denies any trauma.  Will monitor patient, reassess in discussed continued evaluation with the patient after this is completed.    Amount and/or Complexity of Data Reviewed  Labs: ordered.  Radiology: ordered.    Risk  Prescription drug management.  Decision regarding hospitalization.        ED Course as of 02/03/25 0655   Mon Feb 03, 2025   4798 Patient's glucose notably elevated but no anion gap and carbon oxide normal, no signs of DKA.  I have concerns patient does not adequately take care of himself considering his intake and history.  Patient has lost weight since his last visit the emergency room and notes poor oral intake.  As such, movement patient for continued monitoring and evaluation by neuropsychiatry regarding his competency which is borderline on my evaluation.    0504 The patient's blood pressure was lower repeat after and had been stable for several hours in the emergency room.  Patient appears to be asymptomatic regarding this, denies any lightheadedness or near syncope.  Patient again denies any headache, chest pain, or abdominal pain.  Will treat patient with fluids.  Discussed with medicine who accepted the patient for continued monitoring.       Medications   acetaminophen (TYLENOL) tablet 650 mg (has no administration in time range)   carbidopa-levodopa (SINEMET)  mg per tablet 2 tablet (has no administration in time range)   insulin glargine (LANTUS) subcutaneous injection 14 Units 0.14 mL (has no administration in time range)   rivaroxaban (XARELTO) tablet 10 mg (has no administration in time range)   aluminum-magnesium hydroxide-simethicone (MAALOX) oral suspension 30 mL (has no administration in time range)   nicotine (NICODERM CQ) 14 mg/24hr TD 24 hr patch 1 patch (has no administration in time range)   insulin lispro (HumALOG/ADMELOG) 100 units/mL subcutaneous injection 1-5 Units (has no administration in time range)   insulin lispro (HumALOG/ADMELOG) 100 units/mL subcutaneous injection 1-5 Units (has no administration in time range)   multi-electrolyte (PLASMALYTE-A/ISOLYTE-S PH 7.4) IV solution (125 mL/hr Intravenous New Bag 2/3/25 0654)   sodium chloride 0.9 % bolus 1,000 mL (0 mL Intravenous Stopped 2/3/25 0602)       ED Risk Strat Scores                          SBIRT 20yo+      Flowsheet Row Most Recent Value   Initial Alcohol Screen: US AUDIT-C     1. How often do you have a drink containing alcohol? 0 Filed at: 02/03/2025 0109   2. How many drinks containing alcohol do you have on a typical day you are drinking?  0 Filed at: 02/03/2025 0109   3b. FEMALE Any Age, or MALE 65+: How often do you have 4 or more drinks on one occassion? 0 Filed at: 02/03/2025 0109   Audit-C Score 0 Filed at: 02/03/2025 0109   IRWIN: How many times in the past year have  "you...    Used an illegal drug or used a prescription medication for non-medical reasons? Never Filed at: 02/03/2025 0109                            History of Present Illness       Chief Complaint   Patient presents with    Medical Problem     Pt BIBA from home. Pt's roommate called stating pt has been \"in and out of it with Hx of dementia, Parkinson's, fecal incontinence, and non compliant with medications for some time now\". Pt has no current complaints.       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)     Prostate cancer (HCC)     Thyroid cancer (HCC)       Past Surgical History:   Procedure Laterality Date    THYROIDECTOMY        Family History   Family history unknown: Yes      Social History     Tobacco Use    Smoking status: Every Day     Current packs/day: 0.50     Average packs/day: 0.5 packs/day for 61.0 years (30.5 ttl pk-yrs)     Types: Cigarettes    Smokeless tobacco: Never   Vaping Use    Vaping status: Never Used   Substance Use Topics    Alcohol use: Not Currently    Drug use: Not Currently      E-Cigarette/Vaping    E-Cigarette Use Never User       E-Cigarette/Vaping Substances    Nicotine No     THC No     CBD No     Flavoring No     Other No     Unknown No       I have reviewed and agree with the history as documented.     HPI    Review of Systems        Objective       ED Triage Vitals [02/03/25 0108]   Temperature Pulse Blood Pressure Respirations SpO2 Patient Position - Orthostatic VS   (!) 97.3 °F (36.3 °C) 93 115/75 18 99 % Lying      Temp Source Heart Rate Source BP Location FiO2 (%) Pain Score    Oral Monitor Right arm -- --      Vitals      Date and Time Temp Pulse SpO2 Resp BP Pain Score FACES Pain Rating User   02/03/25 0615 -- 80 94 % -- 94/52 -- -- SHERYL   02/03/25 0600 -- 72 95 % -- 91/54 -- --    02/03/25 0545 -- 75 94 % -- 93/52 -- --    02/03/25 0530 -- 73 92 % -- " 91/53 -- --    02/03/25 0515 -- 85 95 % -- 74/49 -- --    02/03/25 0500 -- 87 95 % 16 71/45 provider aware, new orders received -- --    02/03/25 0300 -- 82 97 % 16 116/55 -- --    02/03/25 0200 -- 80 96 % 16 110/67 -- --    02/03/25 0115 -- 87 98 % 18 115/75 -- --    02/03/25 0108 97.3 °F (36.3 °C) 93 99 % 18 115/75 -- --             Physical Exam  Constitutional:       Appearance: He is ill-appearing.      Comments: Chronically ill-appearing, cachectic.   HENT:      Head: Normocephalic and atraumatic.      Mouth/Throat:      Mouth: Mucous membranes are dry.   Eyes:      Conjunctiva/sclera: Conjunctivae normal.      Pupils: Pupils are equal, round, and reactive to light.   Cardiovascular:      Rate and Rhythm: Normal rate and regular rhythm.   Pulmonary:      Effort: Pulmonary effort is normal.      Breath sounds: Normal breath sounds.   Abdominal:      Palpations: Abdomen is soft.      Tenderness: There is no abdominal tenderness. There is no guarding or rebound.   Skin:     General: Skin is warm.   Neurological:      General: No focal deficit present.      Mental Status: He is alert. He is disoriented.      Cranial Nerves: Cranial nerves 2-12 are intact.      Sensory: Sensation is intact. No sensory deficit.      Motor: Motor function is intact.      Coordination: Coordination is intact.   Psychiatric:         Mood and Affect: Mood normal. Mood is not depressed.         Speech: Speech normal.         Behavior: Behavior is agitated. Behavior is cooperative.         Thought Content: Thought content does not include homicidal or suicidal ideation. Thought content does not include homicidal or suicidal plan.         Results Reviewed       Procedure Component Value Units Date/Time    Hemoglobin A1c w/EAG Estimation (Prechecked if no A1C within 90 days) [[id number]] Collected: 02/03/25 0206    Lab Status: In process Specimen: Blood from Arm, Left Updated: 02/03/25 0604    Salicylate level [[id number]]   (Normal) Collected: 02/03/25 0206    Lab Status: Final result Specimen: Blood from Arm, Left Updated: 02/03/25 0310     Salicylate Lvl <5 mg/dL     Acetaminophen level-If concentration is detectable, please discuss with medical  on call. [[id number]]  (Abnormal) Collected: 02/03/25 0206    Lab Status: Final result Specimen: Blood from Arm, Left Updated: 02/03/25 0309     Acetaminophen Level <2 ug/mL     Comprehensive metabolic panel [[id number]]  (Abnormal) Collected: 02/03/25 0206    Lab Status: Final result Specimen: Blood from Arm, Left Updated: 02/03/25 0309     Sodium 135 mmol/L      Potassium 5.0 mmol/L      Chloride 98 mmol/L      CO2 31 mmol/L      ANION GAP 6 mmol/L      BUN 25 mg/dL      Creatinine 1.24 mg/dL      Glucose 398 mg/dL      Calcium 10.0 mg/dL      AST 28 U/L      ALT 49 U/L      Alkaline Phosphatase 80 U/L      Total Protein 6.6 g/dL      Albumin 3.7 g/dL      Total Bilirubin 0.52 mg/dL      eGFR 56 ml/min/1.73sq m     Narrative:      [employer] guidelines for Chronic Kidney Disease (CKD):     Stage 1 with normal or high GFR (GFR > 90 mL/min/1.73 square meters)    Stage 2 Mild CKD (GFR = 60-89 mL/min/1.73 square meters)    Stage 3A Moderate CKD (GFR = 45-59 mL/min/1.73 square meters)    Stage 3B Moderate CKD (GFR = 30-44 mL/min/1.73 square meters)    Stage 4 Severe CKD (GFR = 15-29 mL/min/1.73 square meters)    Stage 5 End Stage CKD (GFR <15 mL/min/1.73 square meters)  Note: GFR calculation is accurate only with a steady state creatinine    Ethanol [[id number]]  (Normal) Collected: 02/03/25 0206    Lab Status: Final result Specimen: Blood from Arm, Left Updated: 02/03/25 0246     Ethanol Lvl <10 mg/dL     CBC and differential [[id number]] Collected: 02/03/25 0206    Lab Status: Final result Specimen: Blood from Arm, Left Updated: 02/03/25 0218     WBC 6.68 Thousand/uL      RBC 5.24 Million/uL      Hemoglobin 16.5 g/dL      Hematocrit 47.5 %      MCV 91 fL       MCH 31.5 pg      MCHC 34.7 g/dL      RDW 14.3 %      MPV 9.0 fL      Platelets 266 Thousands/uL      nRBC 0 /100 WBCs      Segmented % 60 %      Immature Grans % 1 %      Lymphocytes % 29 %      Monocytes % 6 %      Eosinophils Relative 3 %      Basophils Relative 1 %      Absolute Neutrophils 4.08 Thousands/µL      Absolute Immature Grans 0.04 Thousand/uL      Absolute Lymphocytes 1.92 Thousands/µL      Absolute Monocytes 0.40 Thousand/µL      Eosinophils Absolute 0.17 Thousand/µL      Basophils Absolute 0.07 Thousands/µL             CT head without contrast   Final Interpretation by Erlin Wheeler DO (02/03 0246)      No acute intracranial abnormality.  Chronic microangiopathic changes.                  Workstation performed: [id number]             Procedures    ED Medication and Procedure Management   Prior to Admission Medications   Prescriptions Last Dose Informant Patient Reported? Taking?   Atropine Sulfate 0.25 MG/5ML SOSY  Self Yes No   Sig: Take 0.25 mg by mouth 3 (three) times a day as needed   Cyanocobalamin 1000 MCG SUBL  Self Yes No   Sig: Place 1,000 mcg under the tongue daily 4 times a week   Insulin Aspart (NovoLOG) 100 units/mL injection   Yes No   Sig: Inject 14 Units under the skin 3 (three) times a day with meals   acetaminophen (TYLENOL) 325 mg tablet  Self Yes No   Sig: Take 325 mg by mouth continuous as needed   carbidopa-levodopa (SINEMET)  mg per tablet   No No   Sig: Take 1 tablet by mouth 3 (three) times a day   folic acid (FOLVITE) 1 mg tablet  Self Yes No   Sig: Take 1 mg by mouth 2 (two) times a day   glipiZIDE (GLUCOTROL XL) 5 mg 24 hr tablet  Self Yes No   Sig: Take 5 mg by mouth   insulin glargine (LANTUS) 100 units/mL subcutaneous injection   Yes No   Sig: Inject 40 Units under the skin daily at bedtime   levothyroxine 112 mcg tablet  Self Yes No   Sig: Take 112 mcg by mouth 2 (two) times a day   oxybutynin (DITROPAN) 5 mg tablet   Yes No   Sig: Take 5 mg by mouth    pantoprazole (PROTONIX) 40 mg tablet  Self No No   Sig: Take 1 tablet (40 mg total) by mouth daily in the early morning   Patient not taking: Reported on 9/1/2024   pyridostigmine (MESTINON) 60 mg tablet  Self Yes No   Sig: Take 60 mg by mouth 4 (four) times a day    Patient not taking: Reported on 9/19/2024   rivaroxaban (XARELTO) 20 mg tablet   Yes No   Sig: Take 20 mg by mouth   Patient not taking: Reported on 9/1/2024   saxagliptin (ONGLYZA) 5 MG tablet  Self Yes No   Sig: Take 5 mg by mouth daily   sildenafil (VIAGRA) 100 mg tablet  Self Yes No   Sig: Take 100 mg by mouth as needed    simvastatin (ZOCOR) 20 mg tablet  Self Yes No   Sig: Take 10 mg by mouth daily Ordered as 20mg pt takes half a tab      Facility-Administered Medications: None     Patient's Medications   Discharge Prescriptions    No medications on file     No discharge procedures on file.  ED SEPSIS DOCUMENTATION   Time reflects when diagnosis was documented in both MDM as applicable and the Disposition within this note       Time User Action Codes Description Comment    2/3/2025  3:31 AM Ruel Myers [R73.9] Hyperglycemia     2/3/2025  3:31 AM Ruel Myers [Z60.9] Poor social situation     2/3/2025  5:50 AM Fauzia Isaac [R41.82] Altered mental status     2/3/2025  5:50 AM Fauzia Isaac [R62.7] Failure to thrive in adult                  Ruel Myers MD  02/03/25 0639       Ruel Myers MD  02/03/25 0655

## 2025-02-03 NOTE — ASSESSMENT & PLAN NOTE
Questionable medication compliance  Also unknown [facility] if patient currently is supposed to be taking pyridostigmine 60 mg 4 times daily  Can follow-up during the day with pharmacy

## 2025-02-03 NOTE — H&P
"H&P - Hospitalist   Name: Kalpesh Haro 74 y.o. male I MRN: [id number]  Unit/Bed#: FT 01 I Date of Admission: 2/3/2025   Date of Service: 2/3/2025 I Hospital Day: 0     Assessment & Plan  Failure to thrive in adult  Roommate called EMS as patient not taking care of himself, not taking medications appropriately not eating appropriately, and patient covered in feces and urine, malodorous  Patient homeless living in a hotel room with a roommate  Currently alert and oriented to person, place.  Disoriented to time  CT head negative  Case management consulted to help with possible placement  Neuropsych eval for competency as patient adamant he wants to go back to his hotel room, but does not appear at this time to be able to care for self  Type 2 diabetes mellitus with hyperglycemia, with long-term current use of insulin (HCC)  Lab Results   Component Value Date    HGBA1C 15.9 (H) 09/01/2024       No results for input(s): \"POCGLU\" in the last 72 hours.    Blood Sugar Average: Last 72 hrs:  Patient not taking medication as prescribed, reporting taking once in a while  Supposed to be on Lantus 40 units nightly, since unknown how or when patient has been taking it we will start with Lantus 14 units nightly  Sliding scale insulin, used to be on 14 units 3 times daily of NovoLog with meals, but unsure when or if patient is taking    Parkinson's disease (HCC)  Unknown medication compliance, but will order home Sinemet 50/200 3 times daily  Personal history of DVT and PE  [facility], unknown if patient has been taking correctly  We will start Xarelto 10 units daily  Myasthenia gravis (HCC)  Questionable medication compliance  Also unknown [facility] if patient currently is supposed to be taking pyridostigmine 60 mg 4 times daily  Can follow-up during the day with pharmacy      VTE Pharmacologic Prophylaxis: VTE Score: 3 Moderate Risk (Score 3-4) - " "Pharmacological DVT Prophylaxis Ordered: rivaroxaban (Xarelto).  Code Status: Level 1 - Full Code   Discussion with family: Patient declined call to .     Anticipated Length of Stay: Patient will be admitted on an inpatient basis with an anticipated length of stay of greater than 2 midnights secondary to see above.    History of Present Illness   Chief Complaint:    Chief Complaint   Patient presents with    Medical Problem     Pt BIBA from home. Pt's roommate called stating pt has been \"in and out of it with Hx of dementia, Parkinson's, fecal incontinence, and non compliant with medications for some time now\". Pt has no current complaints.        Kalpesh Haro is a 74 y.o. male with a PMH of myasthenia gravis, diabetes mellitus type 2, hypothyroidism, Parkinson's disease who presents with complaint from roommate that patient has not been taking care of it, appears to be confused and has not been taking medications as prescribed, is usually found covered in feces and urine.  Patient currently homeless living in a hotel room with a roommate.  Patient currently alert and oriented to person, place.  Disoriented to time and appears to be poor historian.  Unsure if patient is taking medications as prescribed.  Patient reporting multiple times he wants to be left alone as he feels okay.  Denies any pain.    Review of Systems   Respiratory:  Negative for shortness of breath.    Cardiovascular:  Negative for chest pain.   Gastrointestinal:  Negative for abdominal pain.   Neurological:  Negative for dizziness.       Historical Information   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)     Prostate cancer (HCC)     Thyroid cancer (HCC)      Past Surgical History:   Procedure Laterality Date    THYROIDECTOMY       Social History     Tobacco Use    Smoking status: Every Day     Current packs/day: 0.50 "     Average packs/day: 0.5 packs/day for 61.0 years (30.5 ttl pk-yrs)     Types: Cigarettes    Smokeless tobacco: Never   Vaping Use    Vaping status: Never Used   Substance and Sexual Activity    Alcohol use: Not Currently    Drug use: Not Currently    Sexual activity: Not on file     E-Cigarette/Vaping    E-Cigarette Use Never User      E-Cigarette/Vaping Substances    Nicotine No     THC No     CBD No     Flavoring No     Other No     Unknown No      Family history non-contributory  Social History:  Marital Status: Single     Patient Pre-hospital Living Situation: Homeless, living in a hotel room  Patient Pre-hospital Level of Mobility: walks  Patient Pre-hospital Diet Restrictions: diabetic    Meds/Allergies   I have reviewed home medications per review of chart.   Prior to Admission medications    Medication Sig Start Date End Date Taking? Authorizing Provider   acetaminophen (TYLENOL) 325 mg tablet Take 325 mg by mouth continuous as needed    Historical Provider, MD   Atropine Sulfate 0.25 MG/5ML SOSY Take 0.25 mg by mouth 3 (three) times a day as needed    Historical Provider, MD   carbidopa-levodopa (SINEMET)  mg per tablet Take 1 tablet by mouth 3 (three) times a day 8/20/21   Maximo Balbuena MD   Cyanocobalamin 1000 MCG SUBL Place 1,000 mcg under the tongue daily 4 times a week 6/9/11   Historical Provider, MD   folic acid (FOLVITE) 1 mg tablet Take 1 mg by mouth 2 (two) times a day 6/9/11   Historical Provider, MD   glipiZIDE (GLUCOTROL XL) 5 mg 24 hr tablet Take 5 mg by mouth    Historical Provider, MD   Insulin Aspart (NovoLOG) 100 units/mL injection Inject 14 Units under the skin 3 (three) times a day with meals    Historical Provider, MD   insulin glargine (LANTUS) 100 units/mL subcutaneous injection Inject 40 Units under the skin daily at bedtime    Historical Provider, MD   levothyroxine 112 mcg tablet Take 112 mcg by mouth 2 (two) times a day    Historical Provider, MD   oxybutynin  (DITROPAN) 5 mg tablet Take 5 mg by mouth 6/12/24   Historical Provider, MD   pantoprazole (PROTONIX) 40 mg tablet Take 1 tablet (40 mg total) by mouth daily in the early morning  Patient not taking: Reported on 9/1/2024 5/31/20   Jennifer Carson MD   pyridostigmine (MESTINON) 60 mg tablet Take 60 mg by mouth 4 (four) times a day   Patient not taking: Reported on 9/19/2024    Historical Provider, MD   rivaroxaban (XARELTO) 20 mg tablet Take 20 mg by mouth  Patient not taking: Reported on 9/1/2024    Historical Provider, MD   saxagliptin (ONGLYZA) 5 MG tablet Take 5 mg by mouth daily    Historical Provider, MD   sildenafil (VIAGRA) 100 mg tablet Take 100 mg by mouth as needed     Historical Provider, MD   simvastatin (ZOCOR) 20 mg tablet Take 10 mg by mouth daily Ordered as 20mg pt takes half a tab    Historical Provider, MD   Cholecalciferol (VITAMIN D3) 50 MCG (2000 UT) capsule Take 2,000 Units by mouth  2/3/25  Historical Provider, MD   cholestyramine (QUESTRAN) 4 g packet Take 1 packet (4 g total) by mouth 2 (two) times a day with meals 6/10/20 2/3/25  Camden Oneil,      Allergies   Allergen Reactions    Cyclobenzaprine Other (See Comments)    Metformin Diarrhea    Other GI Intolerance    Oxycodone Itching    Apixaban Delirium       Objective :  Temp:  [97.3 °F (36.3 °C)] 97.3 °F (36.3 °C)  HR:  [80-93] 85  BP: ()/(45-75) 74/49  Resp:  [16-18] 16  SpO2:  [95 %-99 %] 95 %  O2 Device: None (Room air)    Physical Exam  Vitals and nursing note reviewed.   Constitutional:       Comments: Disheveled, malodorous, thin   HENT:      Head: Normocephalic.   Cardiovascular:      Rate and Rhythm: Normal rate and regular rhythm.   Pulmonary:      Effort: Pulmonary effort is normal. No respiratory distress.      Breath sounds: Normal breath sounds.   Abdominal:      General: Abdomen is flat. Bowel sounds are normal.      Palpations: Abdomen is soft.      Tenderness: There is no abdominal tenderness.    Musculoskeletal:      Right lower leg: No edema.      Left lower leg: No edema.   Skin:     General: Skin is warm and dry.   Neurological:      Mental Status: He is alert. He is disoriented.   Psychiatric:      Comments: Agitated, guarded          Lines/Drains:            Lab Results: I have reviewed the following results:  Results from last 7 days   Lab Units 02/03/25  0206   WBC Thousand/uL 6.68   HEMOGLOBIN g/dL 16.5   HEMATOCRIT % 47.5   PLATELETS Thousands/uL 266   SEGS PCT % 60   LYMPHO PCT % 29   MONO PCT % 6   EOS PCT % 3     Results from last 7 days   Lab Units 02/03/25  0206   SODIUM mmol/L 135   POTASSIUM mmol/L 5.0   CHLORIDE mmol/L 98   CO2 mmol/L 31   BUN mg/dL 25   CREATININE mg/dL 1.24   ANION GAP mmol/L 6   CALCIUM mg/dL 10.0   ALBUMIN g/dL 3.7   TOTAL BILIRUBIN mg/dL 0.52   ALK PHOS U/L 80   ALT U/L 49   AST U/L 28   GLUCOSE RANDOM mg/dL 398*             Lab Results   Component Value Date    HGBA1C 15.9 (H) 09/01/2024    HGBA1C 12.8 (H) 08/14/2023    HGBA1C 8.7 (H) 05/28/2020           Imaging Results Review: I reviewed radiology reports from this admission including: CT head.  Other Study Results Review: No additional pertinent studies reviewed.    Administrative Statements   I have spent a total time of 70 minutes in caring for this patient on the day of the visit/encounter including Diagnostic results, Importance of tx compliance, Impressions, Counseling / Coordination of care, Documenting in the medical record, Reviewing / ordering tests, medicine, procedures  , and Obtaining or reviewing history  .    ** Please Note: This note has been constructed using a voice recognition system. **

## 2025-02-03 NOTE — ED NOTES
Pts primary contact updated on treatment/care of pt at this time.      Tonya Anaya RN  02/03/25 [id number]

## 2025-02-04 VITALS
BODY MASS INDEX: 24.94 KG/M2 | HEART RATE: 77 BPM | HEIGHT: 66 IN | OXYGEN SATURATION: 96 % | WEIGHT: 155.2 LBS | RESPIRATION RATE: 18 BRPM | TEMPERATURE: 97.8 F | SYSTOLIC BLOOD PRESSURE: 109 MMHG | DIASTOLIC BLOOD PRESSURE: 59 MMHG

## 2025-02-04 PROBLEM — R41.89 COGNITIVE DECLINE: Status: ACTIVE | Noted: 2025-02-04

## 2025-02-04 PROBLEM — R54 FRAILTY SYNDROME IN GERIATRIC PATIENT: Status: ACTIVE | Noted: 2025-02-04

## 2025-02-04 PROBLEM — Z91.89 AT RISK FOR DELIRIUM: Status: ACTIVE | Noted: 2025-02-04

## 2025-02-04 LAB
ANION GAP SERPL CALCULATED.3IONS-SCNC: 7 MMOL/L (ref 4–13)
BASOPHILS # BLD AUTO: 0.06 THOUSANDS/ÂΜL (ref 0–0.1)
BASOPHILS NFR BLD AUTO: 1 % (ref 0–1)
BUN SERPL-MCNC: 22 MG/DL (ref 5–25)
CALCIUM SERPL-MCNC: 7.7 MG/DL (ref 8.4–10.2)
CHLORIDE SERPL-SCNC: 106 MMOL/L (ref 96–108)
CO2 SERPL-SCNC: 24 MMOL/L (ref 21–32)
CREAT SERPL-MCNC: 0.99 MG/DL (ref 0.6–1.3)
EOSINOPHIL # BLD AUTO: 0.2 THOUSAND/ÂΜL (ref 0–0.61)
EOSINOPHIL NFR BLD AUTO: 4 % (ref 0–6)
ERYTHROCYTE [DISTWIDTH] IN BLOOD BY AUTOMATED COUNT: 14.5 % (ref 11.6–15.1)
GFR SERPL CREATININE-BSD FRML MDRD: 73 ML/MIN/1.73SQ M
GLUCOSE SERPL-MCNC: 136 MG/DL (ref 65–140)
GLUCOSE SERPL-MCNC: 213 MG/DL (ref 65–140)
GLUCOSE SERPL-MCNC: 48 MG/DL (ref 65–140)
GLUCOSE SERPL-MCNC: 48 MG/DL (ref 65–140)
GLUCOSE SERPL-MCNC: 52 MG/DL (ref 65–140)
GLUCOSE SERPL-MCNC: 72 MG/DL (ref 65–140)
GLUCOSE SERPL-MCNC: 96 MG/DL (ref 65–140)
HCT VFR BLD AUTO: 37 % (ref 36.5–49.3)
HGB BLD-MCNC: 12.7 G/DL (ref 12–17)
IMM GRANULOCYTES # BLD AUTO: 0.02 THOUSAND/UL (ref 0–0.2)
IMM GRANULOCYTES NFR BLD AUTO: 0 % (ref 0–2)
LYMPHOCYTES # BLD AUTO: 2.3 THOUSANDS/ÂΜL (ref 0.6–4.47)
LYMPHOCYTES NFR BLD AUTO: 51 % (ref 14–44)
MAGNESIUM SERPL-MCNC: 1.9 MG/DL (ref 1.9–2.7)
MCH RBC QN AUTO: 31.4 PG (ref 26.8–34.3)
MCHC RBC AUTO-ENTMCNC: 34.3 G/DL (ref 31.4–37.4)
MCV RBC AUTO: 92 FL (ref 82–98)
MONOCYTES # BLD AUTO: 0.27 THOUSAND/ÂΜL (ref 0.17–1.22)
MONOCYTES NFR BLD AUTO: 6 % (ref 4–12)
NEUTROPHILS # BLD AUTO: 1.76 THOUSANDS/ÂΜL (ref 1.85–7.62)
NEUTS SEG NFR BLD AUTO: 38 % (ref 43–75)
NRBC BLD AUTO-RTO: 0 /100 WBCS
PLATELET # BLD AUTO: 198 THOUSANDS/UL (ref 149–390)
PMV BLD AUTO: 9 FL (ref 8.9–12.7)
POTASSIUM SERPL-SCNC: 4 MMOL/L (ref 3.5–5.3)
PROCALCITONIN SERPL-MCNC: <0.05 NG/ML
RBC # BLD AUTO: 4.04 MILLION/UL (ref 3.88–5.62)
SODIUM SERPL-SCNC: 137 MMOL/L (ref 135–147)
WBC # BLD AUTO: 4.61 THOUSAND/UL (ref 4.31–10.16)

## 2025-02-04 PROCEDURE — 85025 COMPLETE CBC W/AUTO DIFF WBC: CPT

## 2025-02-04 PROCEDURE — 99232 SBSQ HOSP IP/OBS MODERATE 35: CPT | Performed by: INTERNAL MEDICINE

## 2025-02-04 PROCEDURE — 99223 1ST HOSP IP/OBS HIGH 75: CPT | Performed by: INTERNAL MEDICINE

## 2025-02-04 PROCEDURE — 80048 BASIC METABOLIC PNL TOTAL CA: CPT

## 2025-02-04 PROCEDURE — 83735 ASSAY OF MAGNESIUM: CPT

## 2025-02-04 PROCEDURE — 84145 PROCALCITONIN (PCT): CPT

## 2025-02-04 PROCEDURE — 87040 BLOOD CULTURE FOR BACTERIA: CPT

## 2025-02-04 PROCEDURE — 82948 REAGENT STRIP/BLOOD GLUCOSE: CPT

## 2025-02-04 RX ORDER — INSULIN LISPRO 100 [IU]/ML
10 INJECTION, SOLUTION INTRAVENOUS; SUBCUTANEOUS
Status: DISCONTINUED | OUTPATIENT
Start: 2025-02-04 | End: 2025-02-04 | Stop reason: HOSPADM

## 2025-02-04 RX ORDER — INSULIN GLARGINE 100 [IU]/ML
30 INJECTION, SOLUTION SUBCUTANEOUS
Status: DISCONTINUED | OUTPATIENT
Start: 2025-02-04 | End: 2025-02-04 | Stop reason: HOSPADM

## 2025-02-04 RX ADMIN — INSULIN LISPRO 16 UNITS: 100 INJECTION, SOLUTION INTRAVENOUS; SUBCUTANEOUS at 09:35

## 2025-02-04 RX ADMIN — RIVAROXABAN 10 MG: 20 TABLET, FILM COATED ORAL at 09:39

## 2025-02-04 RX ADMIN — INSULIN LISPRO 10 UNITS: 100 INJECTION, SOLUTION INTRAVENOUS; SUBCUTANEOUS at 17:20

## 2025-02-04 RX ADMIN — CARBIDOPA AND LEVODOPA 2 TABLET: 25; 100 TABLET ORAL at 17:19

## 2025-02-04 RX ADMIN — INSULIN LISPRO 2 UNITS: 100 INJECTION, SOLUTION INTRAVENOUS; SUBCUTANEOUS at 17:20

## 2025-02-04 RX ADMIN — SODIUM CHLORIDE, SODIUM GLUCONATE, SODIUM ACETATE, POTASSIUM CHLORIDE, MAGNESIUM CHLORIDE, SODIUM PHOSPHATE, DIBASIC, AND POTASSIUM PHOSPHATE 125 ML/HR: .53; .5; .37; .037; .03; .012; .00082 INJECTION, SOLUTION INTRAVENOUS at 17:18

## 2025-02-04 RX ADMIN — SODIUM CHLORIDE, SODIUM GLUCONATE, SODIUM ACETATE, POTASSIUM CHLORIDE, MAGNESIUM CHLORIDE, SODIUM PHOSPHATE, DIBASIC, AND POTASSIUM PHOSPHATE 125 ML/HR: .53; .5; .37; .037; .03; .012; .00082 INJECTION, SOLUTION INTRAVENOUS at 07:37

## 2025-02-04 RX ADMIN — NICOTINE 1 PATCH: 14 PATCH, EXTENDED RELEASE TRANSDERMAL at 09:39

## 2025-02-04 RX ADMIN — CARBIDOPA AND LEVODOPA 2 TABLET: 25; 100 TABLET ORAL at 09:39

## 2025-02-04 NOTE — ASSESSMENT & PLAN NOTE
Roommate had called EMS since patient has not been taking care of himself and covered in urine/feces, noncompliant with medications, and decreased PO intake  Currently resides in hotel with roommate, Erlin  Labs reviewed, BC x 2 positive for gram positive cocci in clusters, however not meeting SIRS criteria  Afebrile, no leukocytosis, VSS  Neuropsych consulted for capacity  Deemed impaired and not have capacity to make medical decisions

## 2025-02-04 NOTE — ASSESSMENT & PLAN NOTE
Patient at risk for delirium secondary to age, underlying cognitive decline, noncompliance with medication, hospitalization, possible constipation/urinary retention, insomnia, immobility, depression  Provide frequent redirection, reorientation, distraction techniques  Avoid deliriogenic medications such as tramadol, benzodiazepines, anticholinergics,  Benadryl  Treat pain, See geriatric pain protocol  Monitor for constipation and urinary retention  Encourage early and frequent moblization, OOB  Encourage Hydration/ Nutrition  Implement sleep hygiene, limit night time interuptions, group activities  Avoid physical restraints  If QTc greater than 460, monitor and replete and deficiency of  K and Mg, recheck EKG  Most recent EKG Qtc from 8/31/24 of 430  Would consider rechecking EKG if starting on antipsychotic medications

## 2025-02-04 NOTE — ASSESSMENT & PLAN NOTE
Followed with [facility] however last office visit appears to be in 2021  Has had telephone attempts to schedule appointments however none made per chart review  Prescribed Sinemet 25/100 mg TID previously, however last noted to be filled in 2021 per chart review  Noted resting tremor, cogwheeling rigidity, and bradykinesia on last Neurology visits exam  Resting tremor noted today upon exam  Continue Sinemet current order

## 2025-02-04 NOTE — ASSESSMENT & PLAN NOTE
[facility] daily, unknown if patient has been taking correctly  We will start Xarelto 10 units daily.

## 2025-02-04 NOTE — ASSESSMENT & PLAN NOTE
Underlying cognitive decline, noted forgetfulness by patient within the last 3 months  Previously followed by [facility] outpatient, however last noted office visit was in 2021  Alert and Oriented X 3, to self, place and time, disoriented to situation  Baseline: AOx3  MMSE completed this admission by Neuropsych 20/28, impaired  CT scan: moderate microangiopathic change  TSH 41.084 (9/20/2024), noted thyroid cancer with thyroidectomy  Previously on Levothyroxine, however no current prescription  No B12 per chart review  Recommend follow up for formal cognitive evaluation and supportive resources

## 2025-02-04 NOTE — PROGRESS NOTES
Progress Note - Hospitalist   Name: Kalpesh Haro 74 y.o. male I MRN: [id number]  Unit/Bed#: -01 I Date of Admission: 2/3/2025   Date of Service: 2/4/2025 I Hospital Day: 1    Assessment & Plan  Failure to thrive in adult  Roommate called EMS as patient not taking care of himself, not taking medications appropriately not eating appropriately, and patient covered in feces and urine, malodorous  Patient homeless living in a hotel room with a roommate  Currently alert and oriented to person, place.  Disoriented to time  CT head negative  Case management consulted to help with possible placement  Neuropsych eval for competency as patient adamant he wants to go back to his hotel room, but does not appear at this time to be able to care for self  Input from geriatrics appreciated  Patient cannot make informed decisions on his behalf-and has impaired level of functioning per neuropsychology  Type 2 diabetes mellitus with hyperglycemia, with long-term current use of insulin ([facility])  Lab Results   Component Value Date    HGBA1C 12.9 (H) 02/03/2025       Recent Labs     02/04/25  1128 02/04/25  1153 02/04/25  1232 02/04/25  1600   POCGLU 48* 52* 72 213*       Blood Sugar Average: Last 72 hrs:  (P) 151.6Patient not taking medication as prescribed, reporting taking once in a while  Supposed to be on Lantus 40 units nightly, since unknown how or when patient has been taking it we will start with Lantus 14 units nightly  Sliding scale insulin, used to be on 14 units 3 times daily of NovoLog with meals, but unsure when or if patient is taking.  Parkinson's disease ([facility])  Unknown medication compliance, but will order home Sinemet 50/200 3 times dailyl  Personal history of DVT and PE  [facility] 10 units daily, unknown if patient has been taking correctly  We will start Xarelto 10 units daily.  Myasthenia gravis ([facility])  Questionable medication compliance  Also [facility] if patient currently is supposed to be taking pyridostigmine 60 mg 4 times daily  Can follow-up during the day with pharmacy  Cognitive decline  Geriatrics input appreciated  Frailty syndrome in geriatric patient  Will need physical and Occupational Therapy    VTE Pharmacologic Prophylaxis: VTE Score: 3 Xarelto    Mobility:   Basic Mobility Inpatient Raw Score: 19  JH-HLM Goal: 6: Walk 10 steps or more  JH-HLM Achieved: 6: Walk 10 steps or more  JH-HLM Goal NOT achieved. Continue with multidisciplinary rounding and encourage appropriate mobility to improve upon JH-HLM goals.    Patient Centered Rounds: I performed bedside rounds with nursing staff today.   Discussions with Specialists or Other Care Team Provider: Geriatrics    Education and Discussions with Family / Patient:  Spoke to patient in detail.     Current Length of Stay: 1 day(s)  Current Patient Status: Inpatient   Certification Statement: The patient will continue to require additional inpatient hospital stay due to severe generalized weakness and possible need for placement  Discharge Plan: Anticipate discharge in 24-48 hrs to discharge location to be determined pending rehab evaluations.    Code Status: Level 1 - Full Code    Subjective     Patient seen and examined at bedside by me.  No chest pain, palpitations or diaphoresis.  No fever or chills at this point of time.  Patient does have generalized weakness.  He has not been able to care for himself properly at home    Objective :  Temp:  [97.8 °F (36.6 °C)] 97.8 °F (36.6 °C)  HR:  [74-78] 77  BP: ()/(40-59) 109/59  Resp:  [18] 18  SpO2:  [96 %] 96 %  O2 Device: None (Room air)    Body mass index is 25.05 kg/m².     Input and Output Summary (last 24 hours):     Intake/Output Summary (Last 24 hours) at 2/4/2025 1836  Last data filed at 2/4/2025 1834  Gross per 24 hour   Intake 440 ml   Output --   Net 440 ml       Physical Exam    General exam- looks a little weak  HEENT - atraumatic and  normocephalic  Neck- supple  Skin - no fresh rash  Extremities no fresh focal deformities  Cardiovascular- S1-S2 heard  Respiratory- bilateral air entry present, no crackles or rhonchi  Skin - no fresh rash  Abdomen - normal bowel sounds present, no rebound tenderness  CNS- No fresh focal deficits  Psych- no acute psychosis      Lines/Drains:              Lab Results: I have reviewed the following results:   Results from last 7 days   Lab Units 02/04/25  0535   WBC Thousand/uL 4.61   HEMOGLOBIN g/dL 12.7   HEMATOCRIT % 37.0   PLATELETS Thousands/uL 198   SEGS PCT % 38*   LYMPHO PCT % 51*   MONO PCT % 6   EOS PCT % 4     Results from last 7 days   Lab Units 02/04/25  0535 02/03/25  0206   SODIUM mmol/L 137 135   POTASSIUM mmol/L 4.0 5.0   CHLORIDE mmol/L 106 98   CO2 mmol/L 24 31   BUN mg/dL 22 25   CREATININE mg/dL 0.99 1.24   ANION GAP mmol/L 7 6   CALCIUM mg/dL 7.7* 10.0   ALBUMIN g/dL  --  3.7   TOTAL BILIRUBIN mg/dL  --  0.52   ALK PHOS U/L  --  80   ALT U/L  --  49   AST U/L  --  28   GLUCOSE RANDOM mg/dL 136 398*         Results from last 7 days   Lab Units 02/04/25  1600 02/04/25  1232 02/04/25  1153 02/04/25  1128 02/04/25  1126 02/04/25  0806 02/03/25  2049 02/03/25  1611 02/03/25  1157 02/03/25  0833   POC GLUCOSE mg/dl 213* 72 52* 48* 48* 96 212* 228* 221* 326*     Results from last 7 days   Lab Units 02/03/25  0206   HEMOGLOBIN A1C % 12.9*     Results from last 7 days   Lab Units 02/04/25  0535 02/03/25  0826   LACTIC ACID mmol/L  --  1.6   PROCALCITONIN ng/ml <0.05 <0.05       Recent Cultures (last 7 days):   Results from last 7 days   Lab Units 02/04/25  0536 02/03/25  0826 02/03/25  0810   BLOOD CULTURE  Received in Microbiology Lab. Culture in Progress.  Received in Microbiology Lab. Culture in Progress.  --   --    GRAM STAIN RESULT   --  Gram positive cocci in clusters* Gram positive cocci in clusters*       Imaging Results Review: No pertinent imaging studies reviewed.  Other Study Results  Review: No additional pertinent studies reviewed.    Last 24 Hours Medication List:     Current Facility-Administered Medications:     acetaminophen (TYLENOL) tablet 650 mg, Q6H PRN    aluminum-magnesium hydroxide-simethicone (MAALOX) oral suspension 30 mL, Q6H PRN    carbidopa-levodopa (SINEMET)  mg per tablet 2 tablet, TID    insulin glargine (LANTUS) subcutaneous injection 30 Units 0.3 mL, HS    insulin lispro (HumALOG/ADMELOG) 100 units/mL subcutaneous injection 1-5 Units, TID AC    insulin lispro (HumALOG/ADMELOG) 100 units/mL subcutaneous injection 1-5 Units, HS    insulin lispro (HumALOG/ADMELOG) 100 units/mL subcutaneous injection 10 Units, TID With Meals    multi-electrolyte (PLASMALYTE-A/ISOLYTE-S PH 7.4) IV solution, Continuous, Last Rate: 125 mL/hr (02/04/25 2648)    nicotine (NICODERM CQ) 14 mg/24hr TD 24 hr patch 1 patch, Daily    rivaroxaban (XARELTO) tablet 10 mg, Daily With Breakfast    Administrative Statements   Today, Patient Was Seen By: Remington Khan MD      **Please Note: This note may have been constructed using a voice recognition system.**

## 2025-02-04 NOTE — QUICK NOTE
"Notified of positive blood culture result: \"Blood culture positive for gram positive cocci in clusters\".  As patient is not currently meeting any SIRS criteria and does not have any suspected source of infection, will repeat blood cultures as positive blood cx result likely due to contamination.  "

## 2025-02-04 NOTE — ASSESSMENT & PLAN NOTE
Clinical Frail Scale: 6- Moderately Frail, worsening prior to hospitalization  Multifactorial secondary to comorbidities, overall deconditioning, advanced age, decreased PO intake  Need help with all outside activities  Need help with stairs and bathing  May need assistance with dressing  Albumin 3.7  Encourage adequate PO intake

## 2025-02-04 NOTE — CONSULTS
Consultation - Geriatric Medicine   Name: Kalpesh Haro 74 y.o. male I MRN: [id number]  Unit/Bed#: -01 I Date of Admission: 2/3/2025   Date of Service: 2/4/2025 I Hospital Day: 1   Inpatient consult to Gerontology  Consult performed by: FLORENTINO Shell  Consult ordered by: Brittney Ashraf MD        Physician Requesting Evaluation: Remington Khan MD   Reason for Evaluation / Principal Problem: Failure to thrive in adult    Assessment & Plan  Cognitive decline  Underlying cognitive decline, noted forgetfulness by patient within the last 3 months  Previously followed by [facility] outpatient, however last noted office visit was in 2021  Alert and Oriented X 3, to self, place and time, disoriented to situation  Baseline: AOx3  MMSE completed this admission by Neuropsych 20/28, impaired  CT scan: moderate microangiopathic change  TSH 41.084 (9/20/2024), noted thyroid cancer with thyroidectomy  Previously on Levothyroxine, however no current prescription  No B12 per chart review  Recommend follow up for formal cognitive evaluation and supportive resources  Failure to thrive in adult  Roommate had called EMS since patient has not been taking care of himself and covered in urine/feces, noncompliant with medications, and decreased PO intake  Currently resides in hotel with roommate, Erlin  Labs reviewed, BC x 2 positive for gram positive cocci in clusters, however not meeting SIRS criteria  Afebrile, no leukocytosis, VSS  Neuropsych consulted for capacity  Deemed impaired and not have capacity to make medical decisions  Parkinson's disease (HCC)  Followed with [facility] however last office visit appears to be in 2021  Has had telephone attempts to schedule appointments however none made per chart review  Prescribed Sinemet 25/100 mg TID previously, however last noted to be filled in 2021 per chart review  Noted resting tremor, cogwheeling rigidity, and  bradykinesia on last Neurology visits exam  Resting tremor noted today upon exam  Continue Sinemet current order  At risk for delirium  Patient at risk for delirium secondary to age, underlying cognitive decline, noncompliance with medication, hospitalization, possible constipation/urinary retention, insomnia, immobility, depression  Provide frequent redirection, reorientation, distraction techniques  Avoid deliriogenic medications such as tramadol, benzodiazepines, anticholinergics,  Benadryl  Treat pain, See geriatric pain protocol  Monitor for constipation and urinary retention  Encourage early and frequent moblization, OOB  Encourage Hydration/ Nutrition  Implement sleep hygiene, limit night time interuptions, group activities  Avoid physical restraints  If QTc greater than 460, monitor and replete and deficiency of  K and Mg, recheck EKG  Most recent EKG Qtc from 8/31/24 of 430  Would consider rechecking EKG if starting on antipsychotic medications  Type 2 diabetes mellitus with hyperglycemia, with long-term current use of insulin ([facility])  Lab Results   Component Value Date    HGBA1C 12.9 (H) 02/03/2025       Recent Labs     02/03/25  2049 02/04/25  0806 02/04/25  1126 02/04/25  1128   POCGLU 212* 96 48* 48*       Blood Sugar Average: Last 72 hrs:  (P) 168.8388063884278728    Uncontrolled  No diabetic medications per chart review and [facility]; [facility] with [facility] that he has not filled prescription within the last 6 months  Patient stating he uses Insulin at home and checks blood sugars  Typically in the 400s per patient  Arjun/CHO diet  SSI and POCT glucose checks  Hypoglycemia protocol  Myasthenia gravis ([facility])  Was following with [facility] for Myasthenia Gravis in 2021, however does not appear to be continuing at this time  Currently prescribed Pyridostigmine 60 mg four times daily and last filled on 1/21/25 for 25 day supply  Unsure of  "compliance  Frailty syndrome in geriatric patient  Clinical Frail Scale: 6- Moderately Frail, worsening prior to hospitalization  Multifactorial secondary to comorbidities, overall deconditioning, advanced age, decreased PO intake  Need help with all outside activities  Need help with stairs and bathing  May need assistance with dressing  Albumin 3.7  Encourage adequate PO intake        History of Present Illness   Hx and PE limited by: n/a  HPI: Kalpesh Haro is a 74 y.o. year old male who presents with recurrent issues and altered mental status as well as his roommate stating that he is noncompliant with medications and is unable to take care of himself. Decreased appetite over the past few days. He was found covered in urine and feces. He was admitted for altered mental status, failure to thrive, poor social situation and hyperglycemia. He is being evaluated today with Geriatrics.     He has a past medical history of diabetes, myasthenia gravis, parkinson's disease, thyroid cancer and prostate cancer.     He lives in a hotel room with his friend, Erlin. Prior to this hospitalization, he was apparently independent with ADLs/IADLs. He is using a RW for ambulation. He has been to a [facility] facility prior and received [facility]. He is a . He has a history of depression. His friend assists with ADLs and he states that he gives the patient medications daily, but his friend will often refuse. He has been incontinent of both bladder and bowels lately. He is followed by [facility].    Upon exam today, patient is laying in bed. He states he is \"not doing well\" but denies pain or any complaints at this time. He is stating he feels like he is \"not going to make it out of here.\" Offered emotional support at this time. Asked if he checks his blood sugar at home and he states typically run ~400s. He states he uses insulin daily, however [facility]. Offered to call " Erlin, his friend, for update and patient declined at this time.     Review of Systems   Unable to perform ROS: Other (Altered mental status)       Geriatric Conditions: Memory: impaired, Depression: history, does not take medications, denies SI/HI, Falls: at risk, Assistive Devices:  RW, Fraility: moderate to severe, Nutrition/weight loss: decreased PO intake, Mood: flat affect, depressed, Delirium: at risk, iADL's:  needs assistance, ADL's:  needs assistance    I have reviewed the patient's PMH, PSH, Social History, Family History, Meds, and Allergies  Historical Information   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)     Prostate cancer (HCC)     Thyroid cancer (HCC)      Past Surgical History:   Procedure Laterality Date    THYROIDECTOMY       Social History     Tobacco Use    Smoking status: Every Day     Current packs/day: 0.50     Average packs/day: 0.5 packs/day for 61.0 years (30.5 ttl pk-yrs)     Types: Cigarettes    Smokeless tobacco: Never   Vaping Use    Vaping status: Never Used   Substance and Sexual Activity    Alcohol use: Not Currently    Drug use: Not Currently    Sexual activity: Not on file     E-Cigarette/Vaping    E-Cigarette Use Never User      E-Cigarette/Vaping Substances    Nicotine No     THC No     CBD No     Flavoring No     Other No     Unknown No      Family History   Family history unknown: Yes     Social History     Tobacco Use    Smoking status: Every Day     Current packs/day: 0.50     Average packs/day: 0.5 packs/day for 61.0 years (30.5 ttl pk-yrs)     Types: Cigarettes    Smokeless tobacco: Never   Vaping Use    Vaping status: Never Used   Substance and Sexual Activity    Alcohol use: Not Currently    Drug use: Not Currently    Sexual activity: Not on file       Current Facility-Administered Medications:     acetaminophen (TYLENOL) tablet 650 mg, Q6H PRN     aluminum-magnesium hydroxide-simethicone (MAALOX) oral suspension 30 mL, Q6H PRN    carbidopa-levodopa (SINEMET)  mg per tablet 2 tablet, TID    insulin glargine (LANTUS) subcutaneous injection 40 Units 0.4 mL, HS    insulin lispro (HumALOG/ADMELOG) 100 units/mL subcutaneous injection 1-5 Units, TID AC    insulin lispro (HumALOG/ADMELOG) 100 units/mL subcutaneous injection 1-5 Units, HS    insulin lispro (HumALOG/ADMELOG) 100 units/mL subcutaneous injection 16 Units, TID With Meals    multi-electrolyte (PLASMALYTE-A/ISOLYTE-S PH 7.4) IV solution, Continuous, Last Rate: 125 mL/hr (02/04/25 0737)    nicotine (NICODERM CQ) 14 mg/24hr TD 24 hr patch 1 patch, Daily    rivaroxaban (XARELTO) tablet 10 mg, Daily With Breakfast  Prior to Admission Medications   Prescriptions Last Dose Informant Patient Reported? Taking?   Atropine Sulfate 0.25 MG/5ML SOSY Not Taking Self Yes No   Sig: Take 0.25 mg by mouth 3 (three) times a day as needed   Patient not taking: Reported on 2/3/2025   Cyanocobalamin 1000 MCG SUBL 2/2/2025 Self Yes Yes   Sig: Place 1,000 mcg under the tongue daily 4 times a week   Insulin Aspart (NovoLOG) 100 units/mL injection   Yes No   Sig: Inject 14 Units under the skin 3 (three) times a day with meals   acetaminophen (TYLENOL) 325 mg tablet Not Taking Self Yes No   Sig: Take 325 mg by mouth continuous as needed   Patient not taking: Reported on 2/3/2025   carbidopa-levodopa (SINEMET)  mg per tablet 2/2/2025  No Yes   Sig: Take 1 tablet by mouth 3 (three) times a day   folic acid (FOLVITE) 1 mg tablet 2/2/2025 Self Yes Yes   Sig: Take 1 mg by mouth 2 (two) times a day   glipiZIDE (GLUCOTROL XL) 5 mg 24 hr tablet 2/2/2025 Self Yes Yes   Sig: Take 5 mg by mouth   insulin glargine (LANTUS) 100 units/mL subcutaneous injection 2/2/2025  Yes Yes   Sig: Inject 40 Units under the skin daily at bedtime   levothyroxine 112 mcg tablet 2/2/2025 Self Yes Yes   Sig: Take 112 mcg by mouth 2 (two) times a day    oxybutynin (DITROPAN) 5 mg tablet Not Taking  Yes No   Sig: Take 5 mg by mouth   Patient not taking: Reported on 2/3/2025   pantoprazole (PROTONIX) 40 mg tablet  Self No No   Sig: Take 1 tablet (40 mg total) by mouth daily in the early morning   Patient not taking: Reported on 9/1/2024   pyridostigmine (MESTINON) 60 mg tablet Not Taking Self Yes No   Sig: Take 60 mg by mouth 4 (four) times a day    Patient not taking: Reported on 9/19/2024   rivaroxaban (XARELTO) 20 mg tablet Not Taking  Yes No   Sig: Take 20 mg by mouth   Patient not taking: Reported on 2/3/2025   saxagliptin (ONGLYZA) 5 MG tablet Not Taking Self Yes No   Sig: Take 5 mg by mouth daily   Patient not taking: Reported on 2/3/2025   sildenafil (VIAGRA) 100 mg tablet Past Week Self Yes Yes   Sig: Take 100 mg by mouth as needed    simvastatin (ZOCOR) 20 mg tablet 2/2/2025 Self Yes Yes   Sig: Take 10 mg by mouth daily Ordered as 20mg pt takes half a tab      Facility-Administered Medications: None     Cyclobenzaprine, Metformin, Other, Oxycodone, and Apixaban    Meds/Allergies   Home medication review    Tylenol 650 mg Q6H PRN   Oxybutynin 5 mg once daily - last filled 11/19/24 for 90 day supply  Pyridostigmine 60 mg four times daily - last filled 1/21/25 for 25 day supply  Xarelto 10 mg once daily - last filled 4/16/24 for 90 day supply    Personally confirmed with [facility], [phone]. Confirmed with [facility] and [facility] pharmacy that he has not filled prescriptions within last 6 months.         Objective :  Temp:  [97.5 °F (36.4 °C)-97.8 °F (36.6 °C)] 97.8 °F (36.6 °C)  HR:  [63-87] 77  BP: (66-97)/(40-61) 94/59  Resp:  [18-21] 18  SpO2:  [93 %-96 %] 96 %  O2 Device: None (Room air)    Physical Exam  Vitals and nursing note reviewed.   Constitutional:       Appearance: He is ill-appearing.   HENT:      Mouth/Throat:      Mouth: Mucous membranes are dry.      Pharynx: Oropharynx is clear.   Cardiovascular:      Rate and Rhythm: Normal  "rate and regular rhythm.      Pulses: Normal pulses.      Heart sounds: Normal heart sounds.   Pulmonary:      Effort: Pulmonary effort is normal.      Breath sounds: Normal breath sounds.   Abdominal:      General: Abdomen is flat. Bowel sounds are normal.      Palpations: Abdomen is soft.   Musculoskeletal:      Right lower leg: No edema.      Left lower leg: No edema.   Skin:     General: Skin is warm and dry.      Capillary Refill: Capillary refill takes less than 2 seconds.   Neurological:      Mental Status: He is alert.      Comments: Forgetfulness, waxes/wanes   Psychiatric:         Mood and Affect: Mood is depressed. Affect is flat.         Speech: Speech is delayed.         Behavior: Behavior is slowed.           Lab Results: I have reviewed the following results:CBC/BMP:   .     02/04/25  0535   WBC 4.61   HGB 12.7   HCT 37.0      SODIUM 137   K 4.0      CO2 24   BUN 22   CREATININE 0.99   GLUC 136   MG 1.9    , Lactic Acid: No new results in last 24 hours. , Procalcitonin:   Lab Results   Component Value Date    PROCALCITONI <0.05 02/04/2025   , TSH:   , Blood Culture:   Lab Results   Component Value Date    BLOODCX Received in Microbiology Lab. Culture in Progress. 02/04/2025    BLOODCX Received in Microbiology Lab. Culture in Progress. 02/04/2025   , Drug Screen:   , Medication Drug Levels:       Invalid input(s): \"CARBAMAZEPINE\", \"OXCARBAZEPINE\"    Imaging Results Review: I reviewed radiology reports from this admission including: CT head.  Other Study Results Review: No additional pertinent studies reviewed.    Therapies:   Basic Mobility Inpatient Raw Score: 19  -[facility] Goal: 6: Walk 10 steps or more  -[facility] Achieved: 1: Laying in bed  PT: Consulted  OT: Consulted    VTE Prophylaxis: VTE covered by:  rivaroxaban, Oral, 10 mg at 02/04/25 0939    and Sequential compression device (Venodyne)     Code Status: Level 1 - Full Code  Advance Directive and Living Will:      Power of :  "   POLST:      Family and Social Support:   Living Arrangements: Lives w/ Friend  Support Systems: Self; Friend  Type of Current Residence: Other (Comment) (Hotel)  Discharge planning discussed with:: Patient's Friend  Freedom of Choice: Yes      Goals of Care:     Administrative Statements   I have spent a total time of 75 minutes in caring for this patient on the day of the visit/encounter including Diagnostic results, Prognosis, Risks and benefits of tx options, Patient and family education, Importance of tx compliance, Counseling / Coordination of care, Documenting in the medical record, Reviewing / ordering tests, medicine, procedures  , and Obtaining or reviewing history  .

## 2025-02-04 NOTE — ASSESSMENT & PLAN NOTE
Roommate called EMS as patient not taking care of himself, not taking medications appropriately not eating appropriately, and patient covered in feces and urine, malodorous  Patient homeless living in a hotel room with a roommate  Currently alert and oriented to person, place.  Disoriented to time  CT head negative  Case management consulted to help with possible placement  Neuropsych eval for competency as patient adamant he wants to go back to his hotel room, but does not appear at this time to be able to care for self  Input from geriatrics appreciated  Patient cannot make informed decisions on his behalf-and has impaired level of functioning per neuropsychology

## 2025-02-04 NOTE — ASSESSMENT & PLAN NOTE
Lab Results   Component Value Date    HGBA1C 12.9 (H) 02/03/2025       Recent Labs     02/03/25  2049 02/04/25  0806 02/04/25  1126 02/04/25  1128   POCGLU 212* 96 48* 48*       Blood Sugar Average: Last 72 hrs:  (P) 168.0925659639067487    Uncontrolled  No diabetic medications per chart review and [facility]; confirmed with [facility] that he has not filled prescription within the last 6 months  Patient stating he uses Insulin at home and checks blood sugars  Typically in the 400s per patient  Arjun/CHO diet  SSI and POCT glucose checks  Hypoglycemia protocol

## 2025-02-04 NOTE — PLAN OF CARE
Problem: PAIN - ADULT  Goal: Verbalizes/displays adequate comfort level or baseline comfort level  Description: Interventions:  - Encourage patient to monitor pain and request assistance  - Assess pain using appropriate pain scale  - Administer analgesics based on type and severity of pain and evaluate response  - Implement non-pharmacological measures as appropriate and evaluate response  - Consider cultural and social influences on pain and pain management  - Notify physician/advanced practitioner if interventions unsuccessful or patient reports new pain  Outcome: Progressing     Problem: INFECTION - ADULT  Goal: Absence or prevention of progression during hospitalization  Description: INTERVENTIONS:  - Assess and monitor for signs and symptoms of infection  - Monitor lab/diagnostic results  - Monitor all insertion sites, i.e. indwelling lines, tubes, and drains  - Monitor endotracheal if appropriate and nasal secretions for changes in amount and color  - Mars appropriate cooling/warming therapies per order  - Administer medications as ordered  - Instruct and encourage patient and family to use good hand hygiene technique  - Identify and instruct in appropriate isolation precautions for identified infection/condition  Outcome: Progressing  Goal: Absence of fever/infection during neutropenic period  Description: INTERVENTIONS:  - Monitor WBC    Outcome: Progressing     Problem: SAFETY ADULT  Goal: Patient will remain free of falls  Description: INTERVENTIONS:  - Educate patient/family on patient safety including physical limitations  - Instruct patient to call for assistance with activity   - Consult OT/PT to assist with strengthening/mobility   - Keep Call bell within reach  - Keep bed low and locked with side rails adjusted as appropriate  - Keep care items and personal belongings within reach  - Initiate and maintain comfort rounds  - Make Fall Risk Sign visible to staff  - Offer Toileting every 2 Hours,  in advance of need  - Initiate/Maintain bed alarm  - Obtain necessary fall risk management equipment  - Apply yellow socks and bracelet for high fall risk patients  - Consider moving patient to room near nurses station  Outcome: Progressing  Goal: Maintain or return to baseline ADL function  Description: INTERVENTIONS:  -  Assess patient's ability to carry out ADLs; assess patient's baseline for ADL function and identify physical deficits which impact ability to perform ADLs (bathing, care of mouth/teeth, toileting, grooming, dressing, etc.)  - Assess/evaluate cause of self-care deficits   - Assess range of motion  - Assess patient's mobility; develop plan if impaired  - Assess patient's need for assistive devices and provide as appropriate  - Encourage maximum independence but intervene and supervise when necessary  - Involve family in performance of ADLs  - Assess for home care needs following discharge   - Consider OT consult to assist with ADL evaluation and planning for discharge  - Provide patient education as appropriate  Outcome: Progressing  Goal: Maintains/Returns to pre admission functional level  Description: INTERVENTIONS:  - Perform AM-PAC 6 Click Basic Mobility/ Daily Activity assessment daily.  - Set and communicate daily mobility goal to care team and patient/family/caregiver.   - Collaborate with rehabilitation services on mobility goals if consulted  - Perform Range of Motion 3 times a day.  - Reposition patient every 2 hours.  - Dangle patient 3 times a day  - Stand patient 3 times a day  - Ambulate patient 3 times a day  - Out of bed to chair 3 times a day   - Out of bed for meals 3 times a day  - Out of bed for toileting  - Record patient progress and toleration of activity level   Outcome: Progressing     Problem: DISCHARGE PLANNING  Goal: Discharge to home or other facility with appropriate resources  Description: INTERVENTIONS:  - Identify barriers to discharge w/patient and caregiver  -  Arrange for needed discharge resources and transportation as appropriate  - Identify discharge learning needs (meds, wound care, etc.)  - Arrange for interpretive services to assist at discharge as needed  - Refer to Case Management Department for coordinating discharge planning if the patient needs post-hospital services based on physician/advanced practitioner order or complex needs related to functional status, cognitive ability, or social support system  Outcome: Progressing     Problem: Knowledge Deficit  Goal: Patient/family/caregiver demonstrates understanding of disease process, treatment plan, medications, and discharge instructions  Description: Complete learning assessment and assess knowledge base.  Interventions:  - Provide teaching at level of understanding  - Provide teaching via preferred learning methods  Outcome: Progressing

## 2025-02-04 NOTE — PLAN OF CARE
Problem: PAIN - ADULT  Goal: Verbalizes/displays adequate comfort level or baseline comfort level  Description: Interventions:  - Encourage patient to monitor pain and request assistance  - Assess pain using appropriate pain scale  - Administer analgesics based on type and severity of pain and evaluate response  - Implement non-pharmacological measures as appropriate and evaluate response  - Consider cultural and social influences on pain and pain management  - Notify physician/advanced practitioner if interventions unsuccessful or patient reports new pain  Outcome: Progressing     Problem: INFECTION - ADULT  Goal: Absence or prevention of progression during hospitalization  Description: INTERVENTIONS:  - Assess and monitor for signs and symptoms of infection  - Monitor lab/diagnostic results  - Monitor all insertion sites, i.e. indwelling lines, tubes, and drains  - Monitor endotracheal if appropriate and nasal secretions for changes in amount and color  - [place] appropriate cooling/warming therapies per order  - Administer medications as ordered  - Instruct and encourage patient and family to use good hand hygiene technique  - Identify and instruct in appropriate isolation precautions for identified infection/condition  Outcome: Progressing  Goal: Absence of fever/infection during neutropenic period  Description: INTERVENTIONS:  - Monitor WBC    Outcome: Progressing     Problem: SAFETY ADULT  Goal: Patient will remain free of falls  Description: INTERVENTIONS:  - Educate patient/family on patient safety including physical limitations  - Instruct patient to call for assistance with activity   - Consult OT/PT to assist with strengthening/mobility   - Keep Call bell within reach  - Keep bed low and locked with side rails adjusted as appropriate  - Keep care items and personal belongings within reach  - Initiate and maintain comfort rounds  - Make Fall Risk Sign visible to staff  - Offer Toileting every 2 Hours,  in advance of need  - Initiate/Maintain alarm  - Obtain necessary fall risk management equipment  - Apply yellow socks and bracelet for high fall risk patients  - Consider moving patient to room near nurses station  Outcome: Progressing  Goal: Maintain or return to baseline ADL function  Description: INTERVENTIONS:  -  Assess patient's ability to carry out ADLs; assess patient's baseline for ADL function and identify physical deficits which impact ability to perform ADLs (bathing, care of mouth/teeth, toileting, grooming, dressing, etc.)  - Assess/evaluate cause of self-care deficits   - Assess range of motion  - Assess patient's mobility; develop plan if impaired  - Assess patient's need for assistive devices and provide as appropriate  - Encourage maximum independence but intervene and supervise when necessary  - Involve family in performance of ADLs  - Assess for home care needs following discharge   - Consider OT consult to assist with ADL evaluation and planning for discharge  - Provide patient education as appropriate  Outcome: Progressing  Goal: Maintains/Returns to pre admission functional level  Description: INTERVENTIONS:  - Perform AM-PAC 6 Click Basic Mobility/ Daily Activity assessment daily.  - Set and communicate daily mobility goal to care team and patient/family/caregiver.   - Collaborate with rehabilitation services on mobility goals if consulted  - Perform Range of Motion 3 times a day.  - Reposition patient every 3 hours.  - Dangle patient 3 times a day  - Stand patient 3 times a day  - Ambulate patient 3 times a day  - Out of bed to chair 3 times a day   - Out of bed for meals 3 times a day  - Out of bed for toileting  - Record patient progress and toleration of activity level   Outcome: Progressing     Problem: DISCHARGE PLANNING  Goal: Discharge to home or other facility with appropriate resources  Description: INTERVENTIONS:  - Identify barriers to discharge w/patient and caregiver  - Arrange  for needed discharge resources and transportation as appropriate  - Identify discharge learning needs (meds, wound care, etc.)  - Arrange for interpretive services to assist at discharge as needed  - Refer to Case Management Department for coordinating discharge planning if the patient needs post-hospital services based on physician/advanced practitioner order or complex needs related to functional status, cognitive ability, or social support system  Outcome: Progressing     Problem: Knowledge Deficit  Goal: Patient/family/caregiver demonstrates understanding of disease process, treatment plan, medications, and discharge instructions  Description: Complete learning assessment and assess knowledge base.  Interventions:  - Provide teaching at level of understanding  - Provide teaching via preferred learning methods  Outcome: Progressing
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
## 2025-02-04 NOTE — ASSESSMENT & PLAN NOTE
Lab Results   Component Value Date    HGBA1C 12.9 (H) 02/03/2025       Recent Labs     02/04/25  1128 02/04/25  1153 02/04/25  1232 02/04/25  1600   POCGLU 48* 52* 72 213*       Blood Sugar Average: Last 72 hrs:  (P) 151.6Patient not taking medication as prescribed, reporting taking once in a while  Supposed to be on Lantus 40 units nightly, since unknown how or when patient has been taking it we will start with Lantus 14 units nightly  Sliding scale insulin, used to be on 14 units 3 times daily of NovoLog with meals, but unsure when or if patient is taking.

## 2025-02-04 NOTE — ASSESSMENT & PLAN NOTE
Was following with [facility] for Myasthenia Gravis in 2021, however does not appear to be continuing at this time  Currently prescribed Pyridostigmine 60 mg four times daily and last filled on 1/21/25 for 25 day supply  Unsure of compliance

## 2025-02-05 NOTE — QUICK NOTE
"Notified by RN patient requesting to leave AMA since daytime, per daytime provider patient waiting for ride to arrive to be signed out.  Upon approach patient sitting upright in chair fully dressed with roommate at bedside.  Patient is alert and oriented x 4 to self, place, time, event.  Spoke with patient regarding my concerns with him leaving AMA including homelessness and medication nonadherence.  Asked patient if there is anything we can do to make him more comfortable in order to stay and receive treatment. Patient began shouting that he is \"leaving no matter what\", refusing to discuss what I can do to make him comfortable and stay in hospital to receive care.  As patient exhibited that he is A&O x4 upon questioning, risks of leaving AMA including worsening of condition and death were discussed and verbally acknowledged by patient. AMA form signed.  "

## 2025-02-06 LAB
ALL TARGETS: NOT DETECTED
BACTERIA BLD CULT: ABNORMAL
GRAM STN SPEC: ABNORMAL
GRAM STN SPEC: ABNORMAL

## 2025-02-09 LAB
BACTERIA BLD CULT: NORMAL
BACTERIA BLD CULT: NORMAL